# Patient Record
Sex: MALE | Race: WHITE | NOT HISPANIC OR LATINO | Employment: OTHER | URBAN - METROPOLITAN AREA
[De-identification: names, ages, dates, MRNs, and addresses within clinical notes are randomized per-mention and may not be internally consistent; named-entity substitution may affect disease eponyms.]

---

## 2017-04-17 ENCOUNTER — OFFICE VISIT (OUTPATIENT)
Dept: FAMILY MEDICINE | Facility: CLINIC | Age: 67
End: 2017-04-17
Payer: MEDICARE

## 2017-04-17 VITALS
TEMPERATURE: 98.5 F | HEART RATE: 72 BPM | OXYGEN SATURATION: 98 % | WEIGHT: 191.1 LBS | SYSTOLIC BLOOD PRESSURE: 134 MMHG | BODY MASS INDEX: 28.96 KG/M2 | HEIGHT: 68 IN | DIASTOLIC BLOOD PRESSURE: 71 MMHG

## 2017-04-17 DIAGNOSIS — H61.21 CERUMINOSIS, RIGHT: Primary | ICD-10-CM

## 2017-04-17 DIAGNOSIS — L21.9 SEBORRHEIC DERMATITIS: ICD-10-CM

## 2017-04-17 DIAGNOSIS — L30.9 DERMATITIS: ICD-10-CM

## 2017-04-17 PROCEDURE — 99212 OFFICE O/P EST SF 10 MIN: CPT | Performed by: PHYSICIAN ASSISTANT

## 2017-04-17 RX ORDER — HYDROCORTISONE VALERATE CREAM 2 MG/G
CREAM TOPICAL
Qty: 15 G | Refills: 3 | Status: SHIPPED | OUTPATIENT
Start: 2017-04-17 | End: 2017-04-19

## 2017-04-17 RX ORDER — FLUOCINOLONE ACETONIDE 0.1 MG/ML
SOLUTION TOPICAL
Qty: 60 ML | Refills: 1 | Status: SHIPPED | OUTPATIENT
Start: 2017-04-17 | End: 2019-11-13

## 2017-04-17 NOTE — MR AVS SNAPSHOT
"              After Visit Summary   4/17/2017    Tae Arguelles    MRN: [id number]           Patient Information     Date Of Birth          1950        Visit Information        Provider Department      4/17/2017 12:00 PM Janet Alcaraz PA-C [facility]        Today's Diagnoses     Ceruminosis, right    -  1    Seborrheic dermatitis        Dermatitis           Follow-ups after your visit        Who to contact     If you have questions or need follow up information about today's clinic visit or your schedule please contact [facility] directly at [phone].  Normal or non-critical lab and imaging results will be communicated to you by MyChart, letter or phone within 4 business days after the clinic has received the results. If you do not hear from us within 7 days, please contact the clinic through MyChart or phone. If you have a critical or abnormal lab result, we will notify you by phone as soon as possible.  Submit refill requests through [employer] or call your pharmacy and they will forward the refill request to us. Please allow 3 business days for your refill to be completed.          Additional Information About Your Visit        Care EveryWhere ID     This is your Care EveryWhere ID. This could be used by other organizations to access your [facility] medical records  [id number]        Your Vitals Were     Pulse Temperature Height Pulse Oximetry BMI (Body Mass Index)       72 98.5  F (36.9  C) (Oral) 5' 7.5\" (1.715 m) 98% 29.49 kg/m2        Blood Pressure from Last 3 Encounters:   04/17/17 134/71   12/17/15 122/60   08/18/15 127/74    Weight from Last 3 Encounters:   04/17/17 191 lb 1.6 oz (86.7 kg)   12/17/15 184 lb 4.8 oz (83.6 kg)   08/18/15 189 lb (85.7 kg)              Today, you had the following     No orders found for display         Today's Medication Changes          These changes are accurate as of: 4/17/17  1:00 PM.  If you have any questions, ask your nurse or " doctor.               These medicines have changed or have updated prescriptions.        Dose/Directions    fluocinolone 0.01 % solution   Commonly known as:  SYNALAR   This may have changed:  See the new instructions.   Used for:  Dermatitis   Changed by:  Janet Alcaraz PA-C        PRN   Quantity:  60 mL   Refills:  1       hydrocortisone 0.2 % cream   Commonly known as:  WESTCORT   This may have changed:  additional instructions   Used for:  Dermatitis   Changed by:  Janet Alcaraz PA-C        Apply sparingly to affected area bid for 14 days or less   Quantity:  15 g   Refills:  3            Where to get your medicines      These medications were sent to [facility] [address] [place] [address] [address] AT Counts include [facility] beds at the [facility]  [address], [facility] [address]    Hours:  24-hours Phone:  [phone]     fluocinolone 0.01 % solution    hydrocortisone 0.2 % cream                Primary Care Provider Office Phone # Fax #    Janet Alcaraz PA-C [phone]       [facility] [address]  [facility] [address]        Thank you!     Thank you for choosing [facility]  for your care. Our goal is always to provide you with excellent care. Hearing back from our patients is one way we can continue to improve our services. Please take a few minutes to complete the written survey that you may receive in the mail after your visit with us. Thank you!             Your Updated Medication List - Protect others around you: Learn how to safely use, store and throw away your medicines at www.disposemymeds.org.          This list is accurate as of: 4/17/17  1:00 PM.  Always use your most recent med list.                   Brand Name Dispense Instructions for use    ASPIRIN NOT PRESCRIBED    INTENTIONAL    0 each    81 each continuous prn. Antiplatelet medication not prescribed intentionally due to Current anticoagulant therapy (warfarin/enoxaparin)        fluocinolone 0.01 % solution    SYNALAR    60 mL    PRN       furosemide 40 MG tablet    LASIX     1 TABLET DAILY       hydrocortisone 0.2 % cream    WESTCORT    15 g    Apply sparingly to affected area bid for 14 days or less       LIPITOR 20 MG tablet   Generic drug:  atorvastatin      1 TABLET DAILY       lisinopril 10 MG tablet    PRINIVIL/ZESTRIL     1 TABLET DAILY       metoprolol 50 MG tablet    LOPRESSOR    180 tablet    Take 1 tablet by mouth 2 times daily.       Multi-vitamin Tabs tablet      Take 1 tablet by mouth daily.       Niacin 750 MG Tbcr      2 tabs qhs       nitroglycerin 0.4 MG sublingual tablet    NITROSTAT     Place  under the tongue every 5 minutes as needed.       vitamin D 1000 UNITS capsule      Take 1 capsule by mouth daily.       * warfarin 5 MG tablet    COUMADIN     Take 2.5 mg by mouth Tuesday only       * warfarin 7.5 MG tablet    COUMADIN     Take by mouth daily Sunday, Monday, Wed,Thurs, Friday, Sat       * Notice:  This list has 2 medication(s) that are the same as other medications prescribed for you. Read the directions carefully, and ask your doctor or other care provider to review them with you.

## 2017-04-17 NOTE — NURSING NOTE
"Chief Complaint   Patient presents with     Recheck Medication     Ear Problem       Initial /71 (BP Location: Left arm, Patient Position: Chair, Cuff Size: Adult Large)  Pulse 72  Temp 98.5  F (36.9  C) (Oral)  Ht 5' 7.5\" (1.715 m)  Wt 191 lb 1.6 oz (86.7 kg)  SpO2 98%  BMI 29.49 kg/m2 Estimated body mass index is 29.49 kg/(m^2) as calculated from the following:    Height as of this encounter: 5' 7.5\" (1.715 m).    Weight as of this encounter: 191 lb 1.6 oz (86.7 kg).  Medication Reconciliation: complete   Stefany He MA  "

## 2017-04-17 NOTE — PROGRESS NOTES
HPI: 67 yo male here with complaint of R ear feeling plugged for the past week  Has hx of ear wax build up  No cold sx or ear pain    L knee pain x 2 years off and on  Feels full and some burning under the kneecap marj when exercising on the treadmill  This has clucked a few times with walking; this very rare  Denies knee swelling or redness  Tylenol helps when he takes this periodically.  Wants to know if okay to cont exercise.      Past Medical History:   Diagnosis Date     BPH (benign prostatic hyperplasia)     Dr. Whitfield, PSA 0.27 12/2012     CAD (coronary artery disease) 1997    MI      CHF (congestive heart failure) (H)      Colon polyp      History of smoking     Quit 1997     HTN (hypertension), benign      Hyperlipidemia LDL goal <100      Ischemic cardiomyopathy     Dr. Alva     Lichen planus      Normal colonoscopy 1-3-08    Repeat 5-10 years     Pacemaker 1997, 2005    ventricular tachycardia     Pleural effusion      Pneumonia 1998     Pneumothorax     plus rib fx secondary to fall (L)     Seborrheic dermatitis      Toe fracture, right      Past Surgical History:   Procedure Laterality Date     C CABG, ARTERIAL, TWO  1997     COLONOSCOPY N/A 2/12/2015    Procedure: COLONOSCOPY;  Surgeon: Zac Austin MD;  Location:  GI     MOUTH SURGERY  2001    Upper and lower dentures     Social History   Substance Use Topics     Smoking status: Former Smoker     Quit date: 10/23/1997     Smokeless tobacco: Never Used     Alcohol use 0.0 oz/week     0 Standard drinks or equivalent per week      Comment: 1 beer daily     Current Outpatient Prescriptions   Medication Sig Dispense Refill     fluocinolone (SYNALAR) 0.01 % solution PRN 60 mL 1     hydrocortisone (WESTCORT) 0.2 % cream Apply sparingly to affected area bid for 14 days or less 15 g 3     metoprolol (LOPRESSOR) 50 MG tablet Take 1 tablet by mouth 2 times daily. 180 tablet 3     warfarin (COUMADIN) 5 MG tablet Take 2.5 mg by mouth Tuesday only    "    warfarin (COUMADIN) 7.5 MG tablet Take by mouth daily Sunday, Monday, Wed,Thurs, Friday, Sat       ASPIRIN NOT PRESCRIBED, INTENTIONAL, 81 each continuous prn. Antiplatelet medication not prescribed intentionally due to Current anticoagulant therapy (warfarin/enoxaparin) 0 each 0     nitroglycerin (NITROSTAT) 0.4 MG SL tablet Place  under the tongue every 5 minutes as needed.       Cholecalciferol (VITAMIN D) 1000 UNITS capsule Take 1 capsule by mouth daily.       multivitamin, therapeutic with minerals (MULTI-VITAMIN) TABS Take 1 tablet by mouth daily.       LISINOPRIL 10 MG OR TABS 1 TABLET DAILY       FUROSEMIDE 40 MG OR TABS 1 TABLET DAILY       LIPITOR 20 MG OR TABS 1 TABLET DAILY       NIACIN 750 MG OR TBCR 2 tabs qhs       Allergies   Allergen Reactions     Septra [Sulfa Drugs]      FAMILY HISTORY NOTED AND REVIEWED    PHYSICAL EXAM:    /71 (BP Location: Left arm, Patient Position: Chair, Cuff Size: Adult Large)  Pulse 72  Temp 98.5  F (36.9  C) (Oral)  Ht 5' 7.5\" (1.715 m)  Wt 191 lb 1.6 oz (86.7 kg)  SpO2 98%  BMI 29.49 kg/m2    Patient appears non toxic    R ear: cerumen impaction noted.    L ear: minimal cerumen in canal, TM pearly grey    Assessment and Plan:     (H61.21) Ceruminosis, right  (primary encounter diagnosis)  Comment:   Plan: irrig done.    (L21.9) Seborrheic dermatitis  Comment:   Plan: refilled westcort; he is using this sparingly    (L30.9) Dermatitis  Comment: ears itchy at times  Plan: fluocinolone (SYNALAR) 0.01 % solution,             Has cardiac meds refilled by Dr. Alva at [facility].  Reviewed lipids on CareEverywhere    Janet Alcaraz PA-C          "

## 2017-04-19 ENCOUNTER — TELEPHONE (OUTPATIENT)
Dept: FAMILY MEDICINE | Facility: CLINIC | Age: 67
End: 2017-04-19

## 2017-04-19 DIAGNOSIS — L21.9 SEBORRHEIC DERMATITIS: Primary | ICD-10-CM

## 2017-04-19 RX ORDER — TRIAMCINOLONE ACETONIDE 1 MG/G
CREAM TOPICAL
Qty: 15 G | Refills: 0 | Status: SHIPPED | OUTPATIENT
Start: 2017-04-19 | End: 2017-11-07

## 2017-04-19 NOTE — TELEPHONE ENCOUNTER
Fax from [facility] ([facility])    Hydrocortisone 0.2% cream is not covered by insurance.    Rx'd 4/17/17    RT Doug (R)

## 2017-11-07 ENCOUNTER — OFFICE VISIT (OUTPATIENT)
Dept: FAMILY MEDICINE | Facility: CLINIC | Age: 67
End: 2017-11-07
Payer: MEDICARE

## 2017-11-07 VITALS
OXYGEN SATURATION: 99 % | WEIGHT: 181 LBS | TEMPERATURE: 97.3 F | HEIGHT: 68 IN | SYSTOLIC BLOOD PRESSURE: 125 MMHG | BODY MASS INDEX: 27.43 KG/M2 | DIASTOLIC BLOOD PRESSURE: 70 MMHG | HEART RATE: 70 BPM

## 2017-11-07 DIAGNOSIS — R73.9 HYPERGLYCEMIA: Primary | ICD-10-CM

## 2017-11-07 DIAGNOSIS — Z12.5 SCREENING FOR PROSTATE CANCER: ICD-10-CM

## 2017-11-07 DIAGNOSIS — D75.89 MACROCYTOSIS WITHOUT ANEMIA: ICD-10-CM

## 2017-11-07 DIAGNOSIS — Z95.0 PACEMAKER: ICD-10-CM

## 2017-11-07 DIAGNOSIS — I10 HTN (HYPERTENSION), BENIGN: ICD-10-CM

## 2017-11-07 DIAGNOSIS — N40.0 BENIGN PROSTATIC HYPERPLASIA WITHOUT LOWER URINARY TRACT SYMPTOMS: ICD-10-CM

## 2017-11-07 DIAGNOSIS — I25.5 ISCHEMIC CARDIOMYOPATHY: ICD-10-CM

## 2017-11-07 DIAGNOSIS — Z00.00 ENCOUNTER FOR ROUTINE ADULT HEALTH EXAMINATION WITHOUT ABNORMAL FINDINGS: ICD-10-CM

## 2017-11-07 LAB
ALBUMIN SERPL-MCNC: 4.4 G/DL (ref 3.4–5)
ALP SERPL-CCNC: 80 U/L (ref 40–150)
ALT SERPL W P-5'-P-CCNC: 42 U/L (ref 0–70)
ANION GAP SERPL CALCULATED.3IONS-SCNC: 8 MMOL/L (ref 3–14)
AST SERPL W P-5'-P-CCNC: 32 U/L (ref 0–45)
BILIRUB SERPL-MCNC: 0.8 MG/DL (ref 0.2–1.3)
BUN SERPL-MCNC: 10 MG/DL (ref 7–30)
CALCIUM SERPL-MCNC: 9.6 MG/DL (ref 8.5–10.1)
CHLORIDE SERPL-SCNC: 103 MMOL/L (ref 94–109)
CO2 SERPL-SCNC: 26 MMOL/L (ref 20–32)
CREAT SERPL-MCNC: 0.68 MG/DL (ref 0.66–1.25)
ERYTHROCYTE [DISTWIDTH] IN BLOOD BY AUTOMATED COUNT: 12.1 % (ref 10–15)
GFR SERPL CREATININE-BSD FRML MDRD: >90 ML/MIN/1.7M2
GLUCOSE SERPL-MCNC: 117 MG/DL (ref 70–99)
HCT VFR BLD AUTO: 39.5 % (ref 40–53)
HGB BLD-MCNC: 14 G/DL (ref 13.3–17.7)
MCH RBC QN AUTO: 37.5 PG (ref 26.5–33)
MCHC RBC AUTO-ENTMCNC: 35.4 G/DL (ref 31.5–36.5)
MCV RBC AUTO: 106 FL (ref 78–100)
PLATELET # BLD AUTO: 129 10E9/L (ref 150–450)
POTASSIUM SERPL-SCNC: 4.4 MMOL/L (ref 3.4–5.3)
PROT SERPL-MCNC: 7.9 G/DL (ref 6.8–8.8)
RBC # BLD AUTO: 3.73 10E12/L (ref 4.4–5.9)
SODIUM SERPL-SCNC: 137 MMOL/L (ref 133–144)
WBC # BLD AUTO: 5.7 10E9/L (ref 4–11)

## 2017-11-07 PROCEDURE — 83036 HEMOGLOBIN GLYCOSYLATED A1C: CPT | Performed by: PHYSICIAN ASSISTANT

## 2017-11-07 PROCEDURE — 85027 COMPLETE CBC AUTOMATED: CPT | Performed by: PHYSICIAN ASSISTANT

## 2017-11-07 PROCEDURE — 36415 COLL VENOUS BLD VENIPUNCTURE: CPT | Performed by: PHYSICIAN ASSISTANT

## 2017-11-07 PROCEDURE — G0439 PPPS, SUBSEQ VISIT: HCPCS | Performed by: PHYSICIAN ASSISTANT

## 2017-11-07 PROCEDURE — G0103 PSA SCREENING: HCPCS | Performed by: PHYSICIAN ASSISTANT

## 2017-11-07 PROCEDURE — 82607 VITAMIN B-12: CPT | Performed by: PHYSICIAN ASSISTANT

## 2017-11-07 PROCEDURE — 80053 COMPREHEN METABOLIC PANEL: CPT | Performed by: PHYSICIAN ASSISTANT

## 2017-11-07 NOTE — MR AVS SNAPSHOT
After Visit Summary   11/7/2017    Tae Arguelles    MRN: [id number]           Patient Information     Date Of Birth          1950        Visit Information        Provider Department      11/7/2017 10:30 AM Janet Alcaraz PA-C [facility]        Today's Diagnoses     Encounter for routine adult health examination without abnormal findings    -  1    Hyperlipidemia LDL goal <100        HTN (hypertension), benign        Ischemic cardiomyopathy        Benign prostatic hyperplasia without lower urinary tract symptoms        Pacemaker        Screening for prostate cancer          Care Instructions      Preventive Health Recommendations:       Male Ages 65 and over    Yearly exam:             See your health care provider every year in order to  o   Review health changes.   o   Discuss preventive care.    o   Review your medicines if your doctor has prescribed any.    Talk with your health care provider about whether you should have a test to screen for prostate cancer (PSA).    Every 3 years, have a diabetes test (fasting glucose). If you are at risk for diabetes, you should have this test more often.    Every 5 years, have a cholesterol test. Have this test more often if you are at risk for high cholesterol or heart disease.     Every 10 years, have a colonoscopy. Or, have a yearly FIT test (stool test). These exams will check for colon cancer.    Talk to with your health care provider about screening for Abdominal Aortic Aneurysm if you have a family history of AAA or have a history of smoking.  Shots:     Get a flu shot each year.     Get a tetanus shot every 10 years.     Talk to your doctor about your pneumonia vaccines. There are now two you should receive - Pneumovax (PPSV 23) and Prevnar (PCV 13).    Talk to your doctor about a shingles vaccine.     Talk to your doctor about the hepatitis B vaccine.  Nutrition:     Eat at least 5 servings of fruits and vegetables each day.  "    Eat whole-grain bread, whole-wheat pasta and brown rice instead of white grains and rice.     Talk to your doctor about Calcium and Vitamin D.   Lifestyle    Exercise for at least 150 minutes a week (30 minutes a day, 5 days a week). This will help you control your weight and prevent disease.     Limit alcohol to one drink per day.     No smoking.     Wear sunscreen to prevent skin cancer.     See your dentist every six months for an exam and cleaning.     See your eye doctor every 1 to 2 years to screen for conditions such as glaucoma, macular degeneration and cataracts.          Follow-ups after your visit        Who to contact     If you have questions or need follow up information about today's clinic visit or your schedule please contact [facility] directly at [phone].  Normal or non-critical lab and imaging results will be communicated to you by MyChart, letter or phone within 4 business days after the clinic has received the results. If you do not hear from us within 7 days, please contact the clinic through MyChart or phone. If you have a critical or abnormal lab result, we will notify you by phone as soon as possible.  Submit refill requests through [employer] or call your pharmacy and they will forward the refill request to us. Please allow 3 business days for your refill to be completed.          Additional Information About Your Visit        Care EveryWhere ID     This is your Care EveryWhere ID. This could be used by other organizations to access your [facility] medical records  [id number]        Your Vitals Were     Pulse Temperature Height Pulse Oximetry BMI (Body Mass Index)       70 97.3  F (36.3  C) (Oral) 5' 7.5\" (1.715 m) 99% 27.93 kg/m2        Blood Pressure from Last 3 Encounters:   11/07/17 125/70   04/17/17 134/71   12/17/15 122/60    Weight from Last 3 Encounters:   11/07/17 181 lb (82.1 kg)   04/17/17 191 lb 1.6 oz (86.7 kg)   12/17/15 184 lb 4.8 oz (83.6 kg)            "   We Performed the Following     CBC with platelets     Comprehensive metabolic panel     Prostate spec antigen screen        Primary Care Provider Office Phone # Fax #    Janet Alcaraz PA-C [phone] [address] [facility] [address]  [facility] [address]        Equal Access to Services     [facility] SACHA : Hadii aad ku hadasho Soomaali, waaxda luqadaha, qaybta kaalmada adeegyada, waxay idiin hayaan adeeg khjeremiah la'aan . So [facility] [phone].    ATENCIÓN: Si habla español, tiene a otto disposición servicios gratuitos de asistencia lingüística. Llame al [phone].    We comply with applicable federal civil rights laws and [place] laws. We do not discriminate on the basis of race, color, national origin, age, disability, sex, sexual orientation, or gender identity.            Thank you!     Thank you for choosing [facility]  for your care. Our goal is always to provide you with excellent care. Hearing back from our patients is one way we can continue to improve our services. Please take a few minutes to complete the written survey that you may receive in the mail after your visit with us. Thank you!             Your Updated Medication List - Protect others around you: Learn how to safely use, store and throw away your medicines at www.disposemymeds.org.          This list is accurate as of: 11/7/17 11:07 AM.  Always use your most recent med list.                   Brand Name Dispense Instructions for use Diagnosis    ASPIRIN NOT PRESCRIBED    INTENTIONAL    0 each    81 each continuous prn. Antiplatelet medication not prescribed intentionally due to Current anticoagulant therapy (warfarin/enoxaparin)    CAD (coronary artery disease)       fluocinolone 0.01 % solution    SYNALAR    60 mL    PRN    Dermatitis       furosemide 40 MG tablet    LASIX     1 TABLET DAILY        LIPITOR 20 MG tablet   Generic drug:  atorvastatin      1 TABLET DAILY        lisinopril 10 MG tablet    PRINIVIL/ZESTRIL      1 TABLET DAILY        metoprolol 50 MG tablet    LOPRESSOR    180 tablet    Take 1 tablet by mouth 2 times daily.        Multi-vitamin Tabs tablet      Take 1 tablet by mouth daily.        Niacin 750 MG Tbcr      2 tabs qhs        nitroGLYcerin 0.4 MG sublingual tablet    NITROSTAT     Place  under the tongue every 5 minutes as needed.        vitamin D 1000 UNITS capsule      Take 1 capsule by mouth daily.        * warfarin 5 MG tablet    COUMADIN     Take 2.5 mg by mouth Tuesday only        * warfarin 7.5 MG tablet    COUMADIN     Take by mouth daily Sunday, Monday, Wed,Thurs, Friday, Sat        * Notice:  This list has 2 medication(s) that are the same as other medications prescribed for you. Read the directions carefully, and ask your doctor or other care provider to review them with you.

## 2017-11-07 NOTE — LETTER
[facility]  [address] [facility]  [address]  [place]  [address]  Tel: [phone]    November 9, 2017    Tae Arguelles  [address] [facility] [address]        Dear Mr. Arguelles,    It was a pleasure seeing you for your physical examination.  I wanted to get back to you with your test results.  I have enclosed a copy for your review.      Your chemistry panel shows no signs of diabetes but the fasting glucose was high as usual at 117.  I did add on a hemoglobin A1c which looks fine at 5.2%.  Your blood salts, kidney tests, liver tests, and proteins are all fine.    Your white blood cell count and red blood cell counts are normal but the red cells are large in size and the platelets are low. This is often seen in people that drink moderate amounts of alcohol.  Please cut down on your intake of alcohol and return in 3 months for a recheck.    Your PSA was normal at 0.50 so let Dr. Whitfield know when you see him.    Let me know if you have any questions.    Sincerely,    Janet Alcaraz PA-C/DUC      Enclosure: Lab Results  Results for orders placed or performed in visit on 11/07/17   Comprehensive metabolic panel   Result Value Ref Range    Sodium 137 133 - 144 mmol/L    Potassium 4.4 3.4 - 5.3 mmol/L    Chloride 103 94 - 109 mmol/L    Carbon Dioxide 26 20 - 32 mmol/L    Anion Gap 8 3 - 14 mmol/L    Glucose 117 (H) 70 - 99 mg/dL    Urea Nitrogen 10 7 - 30 mg/dL    Creatinine 0.68 0.66 - 1.25 mg/dL    GFR Estimate >90 >60 mL/min/1.7m2    GFR Estimate If Black >90 >60 mL/min/1.7m2    Calcium 9.6 8.5 - 10.1 mg/dL    Bilirubin Total 0.8 0.2 - 1.3 mg/dL    Albumin 4.4 3.4 - 5.0 g/dL    Protein Total 7.9 6.8 - 8.8 g/dL    Alkaline Phosphatase 80 40 - 150 U/L    ALT 42 0 - 70 U/L    AST 32 0 - 45 U/L   CBC with platelets   Result Value Ref Range    WBC 5.7 4.0 - 11.0 10e9/L    RBC Count 3.73 (L) 4.4 - 5.9 10e12/L    Hemoglobin 14.0 13.3 - 17.7 g/dL    Hematocrit 39.5 (L) 40.0 - 53.0 %     (H)  78 - 100 fl    MCH 37.5 (H) 26.5 - 33.0 pg    MCHC 35.4 31.5 - 36.5 g/dL    RDW 12.1 10.0 - 15.0 %    Platelet Count 129 (L) 150 - 450 10e9/L   Prostate spec antigen screen   Result Value Ref Range    PSA 0.50 0 - 4 ug/L   Hemoglobin A1c   Result Value Ref Range    Hemoglobin A1C 5.2 4.3 - 6.0 %   Vitamin B12   Result Value Ref Range    Vitamin B12 497 193 - 986 pg/mL

## 2017-11-07 NOTE — NURSING NOTE
"Chief Complaint   Patient presents with     Wellness Visit     Fasting       Initial /70 (BP Location: Right arm, Cuff Size: Adult Regular)  Pulse 70  Temp 97.3  F (36.3  C) (Oral)  Ht 5' 7.5\" (1.715 m)  Wt 181 lb (82.1 kg)  SpO2 99%  BMI 27.93 kg/m2 Estimated body mass index is 27.93 kg/(m^2) as calculated from the following:    Height as of this encounter: 5' 7.5\" (1.715 m).    Weight as of this encounter: 181 lb (82.1 kg).  Medication Reconciliation: complete       Asya Danielle CMA      "

## 2017-11-07 NOTE — PROGRESS NOTES
SUBJECTIVE:   Tae Arguelles is a 67 year old male who presents for Preventive Visit.  Pt has f/u appt with Dr. Augustineion Dec 4th and will have INR and chol checked there  He has appt with urol on dec 1st  Fasting for labs today  He has seborrhea and uses hydrocortisone cream prescribed by derm which is more expensive but works well.  He has a defibrillator   Last INR was 3.4 on 10/31/17; managed by anticoag clinic at [facility]  He has lost 10lbs over the summer pouring a new concrete deck over the summer and eating better    Are you in the first 12 months of your Medicare Part B coverage?  No    Healthy Habits:    Do you get at least three servings of calcium containing foods daily (dairy, green leafy vegetables, etc.)? yes    Amount of exercise or daily activities, outside of work: 5 day(s) per week    Problems taking medications regularly No    Medication side effects: No    Have you had an eye exam in the past two years? yes    Do you see a dentist twice per year? no    Do you have sleep apnea, excessive snoring or daytime drowsiness?no    COGNITIVE SCREEN  1) Repeat 3 items (Banana, Sunrise, Chair)    2) Clock draw: NORMAL  3) 3 item recall: Recalls 2 objects   Results: NORMAL clock, 1-2 items recalled: COGNITIVE IMPAIRMENT LESS LIKELY    Mini-CogTM Copyright S Lala. Licensed by the author for use in [facility]; reprinted with permission (tere@.[facility]). All rights reserved.        Reviewed and updated as needed this visit by clinical staff  Tobacco  Allergies  Meds  Med Hx  Fam Hx         Reviewed and updated as needed this visit by Provider  Allergies  Meds  Med Hx  Fam Hx        Social History   Substance Use Topics     Smoking status: Former Smoker     Quit date: 10/23/1997     Smokeless tobacco: Never Used     Alcohol use 0.0 oz/week     0 Standard drinks or equivalent per week      Comment: 13 drinks a week       The patient does not drink >3 drinks per day nor >7 drinks per  week.    Today's PHQ-2 Score:   PHQ-2 ( 1999 Pf[employer]) 11/7/2017 4/17/2017   Q1: Little interest or pleasure in doing things 0 0   Q2: Feeling down, depressed or hopeless 0 0   PHQ-2 Score 0 0         Do you feel safe in your environment - Yes    Do you have a Health Care Directive?: No: Advance care planning reviewed with patient; information given to patient to review.      Current providers sharing in care for this patient include: Patient Care Team:  Janet Alcaraz PA-C as PCP - General (Internal Medicine)      Hearing impairment: No    Ability to successfully perform activities of daily living: Yes, no assistance needed     Fall risk:  Fallen 2 or more times in the past year?: No  Any fall with injury in the past year?: No      Home safety:  lack of grab bars in the bathroom      The following health maintenance items are reviewed in Epic and correct as of today:  Health Maintenance   Topic Date Due     MEDICARE ANNUAL WELLNESS VISIT  10/16/1966     AORTIC ANEURYSM SCREENING (SYSTEM ASSIGNED)  10/16/2013     HF ACTION PLAN Q1 YR  03/26/2014     FALL RISK ASSESSMENT  04/07/2016     LIPID MONITORING Q1 YEAR  05/27/2016     ADVANCE DIRECTIVE PLANNING Q5 YRS  03/26/2018     BMP Q6 MOS  05/07/2018     ALT Q1 YR  11/07/2018     CBC Q1 YR  11/07/2018     TETANUS IMMUNIZATION (SYSTEM ASSIGNED)  03/26/2023     COLON CANCER SCREEN (SYSTEM ASSIGNED)  02/12/2025     INFLUENZA VACCINE (SYSTEM ASSIGNED)  Completed     PNEUMOCOCCAL  Completed     HEPATITIS C SCREENING  Completed     Past Medical History:   Diagnosis Date     BPH (benign prostatic hyperplasia)     Dr. Whitfield, PSA 0.27 12/2012     CAD (coronary artery disease) 1997    MI      CHF (congestive heart failure) (H)      Colon polyp      History of smoking     Quit 1997     HTN (hypertension), benign      Hyperlipidemia LDL goal <100      Ischemic cardiomyopathy     Dr. Alva     Lichen planus      Normal colonoscopy 1-3-08    Repeat 5-10 years     Pacemaker  "1997, 2005    ventricular tachycardia     Pleural effusion      Pneumonia 1998     Pneumothorax     plus rib fx secondary to fall (L)     Seborrheic dermatitis      Toe fracture, right      Past Surgical History:   Procedure Laterality Date     C CABG, ARTERIAL, TWO  1997     COLONOSCOPY N/A 2/12/2015    Procedure: COLONOSCOPY;  Surgeon: Zac Austin MD;  Location:  GI     MOUTH SURGERY  2001    Upper and lower dentures     Current Outpatient Prescriptions   Medication Sig Dispense Refill     fluocinolone (SYNALAR) 0.01 % solution PRN 60 mL 1     metoprolol (LOPRESSOR) 50 MG tablet Take 1 tablet by mouth 2 times daily. 180 tablet 3     warfarin (COUMADIN) 5 MG tablet Take 2.5 mg by mouth Tuesday only       warfarin (COUMADIN) 7.5 MG tablet Take by mouth daily Sunday, Monday, Wed,Thurs, Friday, Sat       nitroglycerin (NITROSTAT) 0.4 MG SL tablet Place  under the tongue every 5 minutes as needed.       Cholecalciferol (VITAMIN D) 1000 UNITS capsule Take 1 capsule by mouth daily.       multivitamin, therapeutic with minerals (MULTI-VITAMIN) TABS Take 1 tablet by mouth daily.       LISINOPRIL 10 MG OR TABS 1 TABLET DAILY       FUROSEMIDE 40 MG OR TABS 1 TABLET DAILY       LIPITOR 20 MG OR TABS 1 TABLET DAILY       NIACIN 750 MG OR TBCR 2 tabs qhs       ASPIRIN NOT PRESCRIBED, INTENTIONAL, 81 each continuous prn. Antiplatelet medication not prescribed intentionally due to Current anticoagulant therapy (warfarin/enoxaparin) (Patient not taking: Reported on 11/7/2017) 0 each 0       ROS:  Constitutional, HEENT, cardiovascular, pulmonary, GI, , musculoskeletal, neuro, skin, endocrine and psych systems are negative, except as otherwise noted.      OBJECTIVE:   /70 (BP Location: Right arm, Cuff Size: Adult Regular)  Pulse 70  Temp 97.3  F (36.3  C) (Oral)  Ht 5' 7.5\" (1.715 m)  Wt 181 lb (82.1 kg)  SpO2 99%  BMI 27.93 kg/m2 Estimated body mass index is 27.93 kg/(m^2) as calculated from the " "following:    Height as of this encounter: 5' 7.5\" (1.715 m).    Weight as of this encounter: 181 lb (82.1 kg).  EXAM:   GENERAL: healthy, alert and no distress  EYES: Eyes grossly normal to inspection, PERRL and conjunctivae and sclerae normal  HENT: ear canals and TM's normal, nose and mouth without ulcers or lesions  NECK: no adenopathy, no asymmetry, masses, or scars and thyroid normal to palpation  RESP: lungs clear to auscultation - no rales, rhonchi or wheezes  CV: regular rate and rhythm, normal S1 S2, no S3 or S4, no murmur, click or rub, no peripheral edema and peripheral pulses strong  ABDOMEN: soft, nontender, no hepatosplenomegaly, no masses and bowel sounds normal  MS: no gross musculoskeletal defects noted, no edema  SKIN: no suspicious lesions or rashes  NEURO: Normal strength and tone, mentation intact and speech normal  PSYCH: mentation appears normal, affect normal/bright    ASSESSMENT / PLAN:   Assessment and Plan:     (Z00.00) Encounter for routine adult health examination without abnormal findings  (primary encounter diagnosis)  Comment: colonoscopy is up to date. Immun up to date.  Plan: CBC with platelets            (I10) HTN (hypertension), benign  Comment: well controlled, cont same. Meds prescribed by Dr. Alva.  Plan: Comprehensive metabolic panel            (I25.5) Ischemic cardiomyopathy  Comment:   Plan: Pt had a stable nuclear stress test 12/7/16; see CareEverywhere    (N40.0) Benign prostatic hyperplasia without lower urinary tract symptoms  Comment:   Plan: PSA today. He plans to f/u with Dr. Whitfield next month    (Z95.0) Pacemaker  Comment:   Plan: per pacemaker clinic    (Z12.5) Screening for prostate cancer  Comment:   Plan: Prostate spec antigen screen          End of Life Planning:  Patient currently has an advanced directive: No.  I have verified the patient's ablity to prepare an advanced directive/make health care decisions.  Literature was provided to assist patient in " "preparing an advanced directive.    COUNSELING:  Reviewed preventive health counseling, as reflected in patient instructions        Estimated body mass index is 27.93 kg/(m^2) as calculated from the following:    Height as of this encounter: 5' 7.5\" (1.715 m).    Weight as of this encounter: 181 lb (82.1 kg).     reports that he quit smoking about 20 years ago. He has never used smokeless tobacco.        Appropriate preventive services were discussed with this patient, including applicable screening as appropriate for cardiovascular disease, diabetes, osteopenia/osteoporosis, and glaucoma.  As appropriate for age/gender, discussed screening for colorectal cancer, prostate cancer, breast cancer, and cervical cancer. Checklist reviewing preventive services available has been given to the patient.    Reviewed patients plan of care and provided an AVS. The Basic Care Plan (routine screening as documented in Health Maintenance) for Tae meets the Care Plan requirement. This Care Plan has been established and reviewed with the Patient.    Counseling Resources:  ATP IV Guidelines  Pooled Cohorts Equation Calculator  Breast Cancer Risk Calculator  FRAX Risk Assessment  ICSI Preventive Guidelines  Dietary Guidelines for Americans, 2010  USDA's MyPlate  ASA Prophylaxis  Lung CA Screening    Janet Alcaraz PA-C  [facility]  "

## 2017-11-08 LAB
HBA1C MFR BLD: 5.2 % (ref 4.3–6)
PSA SERPL-ACNC: 0.5 UG/L (ref 0–4)
VIT B12 SERPL-MCNC: 497 PG/ML (ref 193–986)

## 2017-11-09 NOTE — PROGRESS NOTES
It was a pleasure seeing you for your physical examination.  I wanted to get back to you with your test results.  I have enclosed a copy for your review.      Your chemistry panel shows no signs of diabetes but the fasting glucose was high as usual at 117.  I did add on a hemoglobin A1c which looks fine at 5.2%.  Your blood salts, kidney tests, liver tests, and proteins are all fine.    Your white blood cell count and red blood cell counts are normal but the red cells are large in size and the platelets are low. This is often seen in people that drink moderate amounts of alcohol.  Please cut down on your intake of alcohol and return in 3 months for a recheck.    Your PSA was normal at 0.50 so let Dr. Whitfield know when you see him.    Let me know if you have any questions.    Janet Alcaraz PA-C

## 2017-12-01 ENCOUNTER — TRANSFERRED RECORDS (OUTPATIENT)
Dept: HEALTH INFORMATION MANAGEMENT | Facility: CLINIC | Age: 67
End: 2017-12-01

## 2017-12-04 ENCOUNTER — TRANSFERRED RECORDS (OUTPATIENT)
Dept: HEALTH INFORMATION MANAGEMENT | Facility: CLINIC | Age: 67
End: 2017-12-04

## 2018-02-13 ENCOUNTER — DOCUMENTATION ONLY (OUTPATIENT)
Dept: LAB | Facility: CLINIC | Age: 68
End: 2018-02-13

## 2018-02-13 DIAGNOSIS — D69.6 THROMBOCYTOPENIA (H): Primary | ICD-10-CM

## 2018-02-15 DIAGNOSIS — D69.6 THROMBOCYTOPENIA (H): ICD-10-CM

## 2018-02-15 LAB
ERYTHROCYTE [DISTWIDTH] IN BLOOD BY AUTOMATED COUNT: 12 % (ref 10–15)
HCT VFR BLD AUTO: 41.6 % (ref 40–53)
HGB BLD-MCNC: 14.2 G/DL (ref 13.3–17.7)
MCH RBC QN AUTO: 35.9 PG (ref 26.5–33)
MCHC RBC AUTO-ENTMCNC: 34.1 G/DL (ref 31.5–36.5)
MCV RBC AUTO: 105 FL (ref 78–100)
PLATELET # BLD AUTO: 140 10E9/L (ref 150–450)
RBC # BLD AUTO: 3.95 10E12/L (ref 4.4–5.9)
WBC # BLD AUTO: 7.4 10E9/L (ref 4–11)

## 2018-02-15 PROCEDURE — 36415 COLL VENOUS BLD VENIPUNCTURE: CPT | Performed by: PHYSICIAN ASSISTANT

## 2018-02-15 PROCEDURE — 85027 COMPLETE CBC AUTOMATED: CPT | Performed by: PHYSICIAN ASSISTANT

## 2018-02-19 ENCOUNTER — TELEPHONE (OUTPATIENT)
Dept: FAMILY MEDICINE | Facility: CLINIC | Age: 68
End: 2018-02-19

## 2018-02-19 NOTE — PROGRESS NOTES
Call pt  Labs about the same although platelets improved so that is good.  Ask him if he was able to cut back on the amount of alcohol is drinking?

## 2018-02-19 NOTE — TELEPHONE ENCOUNTER
Unable to reach Osman, he was on his treadmill. Asked him to return call for results message from any MA. Wife asked for results, but there is no consent to communicate on file so unable to give her any information. Please relay results message from Janet Alcaraz to Tae when he calls back.   Message from Janet Alcaraz:       CBC with platelets   Status:  Final result   Visible to patient:  No (Inaccessible in MyChart) Dx:  Thrombocytopenia (H) Order: [id number]       Notes Recorded by Janet Alcaraz PA-C on 2/19/2018 at 2:05 PM  Call pt  Labs about the same although platelets improved so that is good.  Ask him if he was able to cut back on the amount of alcohol is drinking?           Ref Range & Units 4d ago   3mo ago        WBC 4.0 - 11.0 10e9/L 7.4 5.7      RBC Count 4.4 - 5.9 10e12/L 3.95 (L) 3.73 (L)      Hemoglobin 13.3 - 17.7 g/dL 14.2 14.0      Hematocrit 40.0 - 53.0 % 41.6 39.5 (L)      MCV 78 - 100 fl 105 (H) 106 (H)      MCH 26.5 - 33.0 pg 35.9 (H) 37.5 (H)      MCHC 31.5 - 36.5 g/dL 34.1 35.4      RDW 10.0 - 15.0 % 12.0 12.1      Platelet Count 150 - 450 10e9/L 140 (L) 129 (L)     [facility] [place]          Specimen Collected: 02/15/18  9:02 AM Last Resulted: 02/15/18  1:54 PM

## 2018-02-19 NOTE — TELEPHONE ENCOUNTER
Osman calling back, I relayed the lab result note to him. He says that he has decreased his alcoholic beverages from about 11-12 beers/week to no more than 6 beers/week. He reports being very consistent with this change since Nov 2017.    Patient was also asking what you were specifically looking for in these lab results.    Allen Davila, CMA

## 2018-02-20 NOTE — TELEPHONE ENCOUNTER
His platelets and red blood cells were abnormal and that is something I see in pts with regular use of alcohol.  Numbers are still a bit out of range but have improved.

## 2018-10-30 LAB — EJECTION FRACTION: 32 %

## 2018-11-12 ENCOUNTER — OFFICE VISIT (OUTPATIENT)
Dept: FAMILY MEDICINE | Facility: CLINIC | Age: 68
End: 2018-11-12
Payer: MEDICARE

## 2018-11-12 VITALS
SYSTOLIC BLOOD PRESSURE: 116 MMHG | HEIGHT: 68 IN | TEMPERATURE: 98.5 F | DIASTOLIC BLOOD PRESSURE: 65 MMHG | OXYGEN SATURATION: 96 % | HEART RATE: 80 BPM | WEIGHT: 181 LBS | BODY MASS INDEX: 27.43 KG/M2

## 2018-11-12 DIAGNOSIS — I25.5 ISCHEMIC CARDIOMYOPATHY: ICD-10-CM

## 2018-11-12 DIAGNOSIS — Z95.0 PACEMAKER: ICD-10-CM

## 2018-11-12 DIAGNOSIS — Z00.00 ENCOUNTER FOR ROUTINE ADULT HEALTH EXAMINATION WITHOUT ABNORMAL FINDINGS: Primary | ICD-10-CM

## 2018-11-12 DIAGNOSIS — E78.5 HYPERLIPIDEMIA LDL GOAL <100: ICD-10-CM

## 2018-11-12 DIAGNOSIS — Z12.5 SCREENING FOR PROSTATE CANCER: ICD-10-CM

## 2018-11-12 DIAGNOSIS — R73.9 HYPERGLYCEMIA: ICD-10-CM

## 2018-11-12 DIAGNOSIS — N40.0 BENIGN PROSTATIC HYPERPLASIA WITHOUT LOWER URINARY TRACT SYMPTOMS: ICD-10-CM

## 2018-11-12 LAB
ERYTHROCYTE [DISTWIDTH] IN BLOOD BY AUTOMATED COUNT: 11.3 % (ref 10–15)
HBA1C MFR BLD: 5.2 % (ref 0–5.6)
HCT VFR BLD AUTO: 41.2 % (ref 40–53)
HGB BLD-MCNC: 14.6 G/DL (ref 13.3–17.7)
MCH RBC QN AUTO: 36.2 PG (ref 26.5–33)
MCHC RBC AUTO-ENTMCNC: 35.4 G/DL (ref 31.5–36.5)
MCV RBC AUTO: 102 FL (ref 78–100)
PLATELET # BLD AUTO: 148 10E9/L (ref 150–450)
RBC # BLD AUTO: 4.03 10E12/L (ref 4.4–5.9)
WBC # BLD AUTO: 6.9 10E9/L (ref 4–11)

## 2018-11-12 PROCEDURE — 82947 ASSAY GLUCOSE BLOOD QUANT: CPT | Performed by: PHYSICIAN ASSISTANT

## 2018-11-12 PROCEDURE — 83036 HEMOGLOBIN GLYCOSYLATED A1C: CPT | Performed by: PHYSICIAN ASSISTANT

## 2018-11-12 PROCEDURE — 85027 COMPLETE CBC AUTOMATED: CPT | Performed by: PHYSICIAN ASSISTANT

## 2018-11-12 PROCEDURE — G0439 PPPS, SUBSEQ VISIT: HCPCS | Performed by: PHYSICIAN ASSISTANT

## 2018-11-12 PROCEDURE — G0103 PSA SCREENING: HCPCS | Performed by: PHYSICIAN ASSISTANT

## 2018-11-12 PROCEDURE — 36415 COLL VENOUS BLD VENIPUNCTURE: CPT | Performed by: PHYSICIAN ASSISTANT

## 2018-11-12 NOTE — MR AVS SNAPSHOT
After Visit Summary   11/12/2018    Tae Arguelles    MRN: [id number]           Patient Information     Date Of Birth          1950        Visit Information        Provider Department      11/12/2018 11:30 AM Janet Alcaraz PA-C [facility]        Today's Diagnoses     Encounter for routine adult health examination without abnormal findings    -  1    Ischemic cardiomyopathy        Hyperlipidemia LDL goal <100        Pacemaker        Hyperglycemia        Benign prostatic hyperplasia without lower urinary tract symptoms        Screening for prostate cancer          Care Instructions      Preventive Health Recommendations:     See your health care provider every year to    Review health changes.     Discuss preventive care.      Review your medicines if your doctor has prescribed any.    Talk with your health care provider about whether you should have a test to screen for prostate cancer (PSA).    Every 3 years, have a diabetes test (fasting glucose). If you are at risk for diabetes, you should have this test more often.    Every 5 years, have a cholesterol test. Have this test more often if you are at risk for high cholesterol or heart disease.     Every 10 years, have a colonoscopy. Or, have a yearly FIT test (stool test). These exams will check for colon cancer.    Talk to with your health care provider about screening for Abdominal Aortic Aneurysm if you have a family history of AAA or have a history of smoking.  Shots:     Get a flu shot each year.     Get a tetanus shot every 10 years.     Talk to your doctor about your pneumonia vaccines. There are now two you should receive - Pneumovax (PPSV 23) and Prevnar (PCV 13).    Talk to your pharmacist about a shingles vaccine.     Talk to your doctor about the hepatitis B vaccine.  Nutrition:     Eat at least 5 servings of fruits and vegetables each day.     Eat whole-grain bread, whole-wheat pasta and brown rice instead of  white grains and rice.     Get adequate Calcium and Vitamin D.   Lifestyle    Exercise for at least 150 minutes a week (30 minutes a day, 5 days a week). This will help you control your weight and prevent disease.     Limit alcohol to one drink per day.     No smoking.     Wear sunscreen to prevent skin cancer.     See your dentist every six months for an exam and cleaning.     See your eye doctor every 1 to 2 years to screen for conditions such as glaucoma, macular degeneration and cataracts.    Personalized Prevention Plan  You are due for the preventive services outlined below.  Your care team is available to assist you in scheduling these services.  If you have already completed any of these items, please share that information with your care team to update in your medical record.    Health Maintenance Due   Topic Date Due     Medicare Annual Wellness Visit  10/16/1966     AORTIC ANEURYSM SCREENING (SYSTEM ASSIGNED)  10/16/2013     Heart Failure Action Plan reviewed - yearly  03/26/2014     Cholesterol Lab - yearly  05/27/2016     Discuss Advance Directive Planning  03/26/2018     Basic Metabolic Lab - every 6 months  05/07/2018     Flu Vaccine (1) 09/01/2018     Liver Monitoring Lab - yearly  11/07/2018     FALL RISK ASSESSMENT  11/07/2018     Depression Assessment 2 - yearly  11/07/2018       Shingrex is the new shingles vaccine.          Follow-ups after your visit        Additional Services     INR CLINIC REFERRAL       Your provider has referred you to INR Services.    Please be aware that coverage of these services is subject to the terms and limitations of your health insurance plan.  Call member services at your health plan with any benefit or coverage questions.    Indication for Anticoagulation: Cardiomyopathy  If nonstandard INR is desired, indicate goal range and explanation:   Expected Duration of Therapy: Lifetime                  Who to contact     If you have questions or need follow up  "information about today's clinic visit or your schedule please contact [facility] directly at [phone].  Normal or non-critical lab and imaging results will be communicated to you by MyChart, letter or phone within 4 business days after the clinic has received the results. If you do not hear from us within 7 days, please contact the clinic through MyChart or phone. If you have a critical or abnormal lab result, we will notify you by phone as soon as possible.  Submit refill requests through [employer] or call your pharmacy and they will forward the refill request to us. Please allow 3 business days for your refill to be completed.          Additional Information About Your Visit        Care EveryWhere ID     This is your Care EveryWhere ID. This could be used by other organizations to access your [facility] medical records  [id number]        Your Vitals Were     Pulse Temperature Height Pulse Oximetry BMI (Body Mass Index)       80 98.5  F (36.9  C) (Tympanic) 5' 7.5\" (1.715 m) 96% 27.93 kg/m2        Blood Pressure from Last 3 Encounters:   11/12/18 116/65   11/07/17 125/70   04/17/17 134/71    Weight from Last 3 Encounters:   11/12/18 181 lb (82.1 kg)   11/07/17 181 lb (82.1 kg)   04/17/17 191 lb 1.6 oz (86.7 kg)              We Performed the Following     CBC with platelets     Glucose     Hemoglobin A1c     INR CLINIC REFERRAL     Prostate spec antigen screen        Primary Care Provider Office Phone # Fax #    Janet Alcaraz PA-C [phone] [address] [facility] [address]  [facility] [address]        Equal Access to Services     NATE GONCALVES : Hadii anne Santana, waaxda luyun, qaybta kaaljunior swenson. So [facility] [phone].    ATENCIÓN: Si habla español, tiene a otto disposición servicios gratuitos de asistencia lingüística. Michi al [phone].    We comply with applicable federal civil rights laws and [place] laws. We do not " discriminate on the basis of race, color, national origin, age, disability, sex, sexual orientation, or gender identity.            Thank you!     Thank you for choosing [facility]  for your care. Our goal is always to provide you with excellent care. Hearing back from our patients is one way we can continue to improve our services. Please take a few minutes to complete the written survey that you may receive in the mail after your visit with us. Thank you!             Your Updated Medication List - Protect others around you: Learn how to safely use, store and throw away your medicines at www.disposemymeds.org.          This list is accurate as of 11/12/18 12:02 PM.  Always use your most recent med list.                   Brand Name Dispense Instructions for use Diagnosis    ASPIRIN NOT PRESCRIBED    INTENTIONAL    0 each    81 each continuous prn. Antiplatelet medication not prescribed intentionally due to Current anticoagulant therapy (warfarin/enoxaparin)    CAD (coronary artery disease)       fluocinolone 0.01 % solution    SYNALAR    60 mL    PRN    Dermatitis       furosemide 40 MG tablet    LASIX     1 TABLET DAILY        LIPITOR 20 MG tablet   Generic drug:  atorvastatin      1 TABLET DAILY        lisinopril 10 MG tablet    PRINIVIL/ZESTRIL     1 TABLET DAILY        metoprolol tartrate 50 MG tablet    LOPRESSOR    180 tablet    Take 1 tablet by mouth 2 times daily.        Multi-vitamin Tabs tablet      Take 1 tablet by mouth daily.        Niacin 750 MG Tbcr      2 tabs qhs        nitroGLYcerin 0.4 MG sublingual tablet    NITROSTAT     Place  under the tongue every 5 minutes as needed.        vitamin D 1000 units capsule      Take 1 capsule by mouth daily.        * warfarin 5 MG tablet    COUMADIN     Take 2.5 mg by mouth Tuesday only        * warfarin 7.5 MG tablet    COUMADIN     Take by mouth daily Sunday, Monday, Wed,Thurs, Friday, Sat        * Notice:  This list has 2 medication(s) that are  the same as other medications prescribed for you. Read the directions carefully, and ask your doctor or other care provider to review them with you.

## 2018-11-12 NOTE — PROGRESS NOTES
"  SUBJECTIVE:   Tae Arguelles is a 68 year old male who presents for Preventive Visit.    Needs to start coming to our INR clinic since [facility] told him they could no longer follow him  On coumadin for cardiomyopathy  He was on his treadmill and felt faint and his defibrillator fired leading to syncope  Under orders to not drive for the next few months because of this  Not able to find that note on CareEverywhere, but can see labs and echo  Had some R chest and knee injury from trying to hold on to the treadmill  He had an echo showing EF of 32%. Also had a pharmacological stress test neg for ischemia  Has appt with Dr. Alva on 11/28/18  He had labs done last month at [facility] which are reviewed.      Are you in the first 12 months of your Medicare Part B coverage?  No    Physical Health:    In general, how would you rate your overall physical health? good    Outside of work, how many days during the week do you exercise? 4-5 days/week    Outside of work, approximately how many minutes a day do you exercise?30-45 minutes    If you drink alcohol do you typically have >3 drinks per day or >7 drinks per week? No    Do you usually eat at least 4 servings of fruit and vegetables a day, include whole grains & fiber and avoid regularly eating high fat or \"junk\" foods? Yes    Do you have any problems taking medications regularly?  NO    Do you have any side effects from medications?  NO    Needs assistance for the following daily activities: transportation temporarily  Until cleared by cardiologist     Which of the following safety concerns are present in your home?:  lack of grab bars in the bathroom     Hearing impairment: No    In the past 6 months, have you been bothered by leaking of urine? no    Mental Health:    In general, how would you rate your overall mental or emotional health? excellent  PHQ-2 Score:      Additional concerns to address?  No    Fall risk:               COGNITIVE SCREEN  1) Repeat 3 items " (Leader, Season, Table)    2) Clock draw: NORMAL  3) 3 item recall: Recalls 2 objects  Results: NORMAL clock, 1-2 items recalled: COGNITIVE IMPAIRMENT LESS LIKELY    Mini-CogTM Copyright S Lala. Licensed by the author for use in [facility]; reprinted with permission (tere@[facility]). All rights reserved.          Reviewed and updated as needed this visit by clinical staff  Tobacco         Reviewed and updated as needed this visit by Provider        Social History   Substance Use Topics     Smoking status: Former Smoker     Quit date: 10/23/1997     Smokeless tobacco: Never Used     Alcohol use 0.0 oz/week     0 Standard drinks or equivalent per week      Comment: 13 drinks a week                             Do you feel safe in your environment - Yes    Do you have a Health Care Directive?: No: Advance care planning reviewed with patient; information given to patient to review.    Current providers sharing in care for this patient include:   Patient Care Team:  Janet Alcaraz PA-C as PCP - General (Internal Medicine)    The following health maintenance items are reviewed in Epic and correct as of today:  Health Maintenance   Topic Date Due     MEDICARE ANNUAL WELLNESS VISIT  10/16/1966     AORTIC ANEURYSM SCREENING (SYSTEM ASSIGNED)  10/16/2013     HF ACTION PLAN Q1 YR  03/26/2014     LIPID MONITORING Q1 YEAR  05/27/2016     ADVANCE DIRECTIVE PLANNING Q5 YRS  03/26/2018     BMP Q6 MOS  05/07/2018     INFLUENZA VACCINE (1) 09/01/2018     ALT Q1 YR  11/07/2018     FALL RISK ASSESSMENT  11/07/2018     PHQ-2 Q1 YR  11/07/2018     CBC Q1 YR  02/15/2019     TETANUS IMMUNIZATION (SYSTEM ASSIGNED)  03/26/2023     COLON CANCER SCREEN (SYSTEM ASSIGNED)  02/12/2025     PNEUMOCOCCAL  Completed     HEPATITIS C SCREENING  Completed     Past Medical History:   Diagnosis Date     BPH (benign prostatic hyperplasia)     Dr. Whitfield, PSA 0.27 12/2012     CAD (coronary artery disease) 1997    MI      CHF (congestive  heart failure) (H)      Colon polyp      History of smoking     Quit 1997     HTN (hypertension), benign      Hyperlipidemia LDL goal <100      Ischemic cardiomyopathy     Dr. Alva     Lichen planus      Normal colonoscopy 1-3-08    Repeat 5-10 years     Pacemaker 1997, 2005    ventricular tachycardia     Pleural effusion      Pneumonia 1998     Pneumothorax     plus rib fx secondary to fall (L)     Seborrheic dermatitis      Toe fracture, right      Past Surgical History:   Procedure Laterality Date     C CABG, ARTERIAL, TWO  1997     COLONOSCOPY N/A 2/12/2015    Procedure: COLONOSCOPY;  Surgeon: Zac Austin MD;  Location:  GI     MOUTH SURGERY  2001    Upper and lower dentures     Current Outpatient Prescriptions   Medication Sig Dispense Refill     ASPIRIN NOT PRESCRIBED, INTENTIONAL, 81 each continuous prn. Antiplatelet medication not prescribed intentionally due to Current anticoagulant therapy (warfarin/enoxaparin) 0 each 0     Cholecalciferol (VITAMIN D) 1000 UNITS capsule Take 1 capsule by mouth daily.       fluocinolone (SYNALAR) 0.01 % solution PRN 60 mL 1     FUROSEMIDE 40 MG OR TABS 1 TABLET DAILY       LIPITOR 20 MG OR TABS 1 TABLET DAILY       LISINOPRIL 10 MG OR TABS 1 TABLET DAILY       metoprolol (LOPRESSOR) 50 MG tablet Take 1 tablet by mouth 2 times daily. 180 tablet 3     multivitamin, therapeutic with minerals (MULTI-VITAMIN) TABS Take 1 tablet by mouth daily.       NIACIN 750 MG OR TBCR 2 tabs qhs       nitroglycerin (NITROSTAT) 0.4 MG SL tablet Place  under the tongue every 5 minutes as needed.       warfarin (COUMADIN) 5 MG tablet Take 2.5 mg by mouth Tuesday only       warfarin (COUMADIN) 7.5 MG tablet Take by mouth daily Sunday, Monday, Wed,Thurs, Friday, Sat         ROS:  Constitutional, HEENT, cardiovascular, pulmonary, GI, , musculoskeletal, neuro, skin, endocrine and psych systems are negative, except as otherwise noted.    OBJECTIVE:   /65 (BP Location: Right  "arm, Patient Position: Chair, Cuff Size: Adult Regular)  Pulse 80  Temp 98.5  F (36.9  C) (Tympanic)  Ht 5' 7.5\" (1.715 m)  Wt 181 lb (82.1 kg)  SpO2 96%  BMI 27.93 kg/m2 Estimated body mass index is 27.93 kg/(m^2) as calculated from the following:    Height as of this encounter: 5' 7.5\" (1.715 m).    Weight as of this encounter: 181 lb (82.1 kg).  EXAM:   GENERAL: healthy, alert and no distress  EYES: Eyes grossly normal to inspection, PERRL and conjunctivae and sclerae normal  HENT: ear canals with bilat cerumen removed with cerumen spoont, TM's normal, nose and mouth without ulcers or lesions  NECK: no adenopathy, no asymmetry, masses, or scars and thyroid normal to palpation  RESP: lungs clear to auscultation - no rales, rhonchi or wheezes  CV: regular rate and rhythm, normal S1 S2, no S3 or S4, no murmur, click or rub, no peripheral edema and peripheral pulses strong  ABDOMEN: soft, nontender, no hepatosplenomegaly, no masses and bowel sounds normal  MS: no gross musculoskeletal defects noted, no edema  Rectal: deferred, pt followed by urology (Dr. Whitfield)  SKIN: no suspicious lesions or rashes  NEURO: Normal strength and tone, mentation intact and speech normal  PSYCH: mentation appears normal, affect normal/bright        ASSESSMENT / PLAN:   Assessment and Plan:     (Z00.00) Encounter for routine adult health examination without abnormal findings  (primary encounter diagnosis)  Comment: colonoscopy up to date.  Plan: CBC with platelets, Glucose        Discussed shingrex. He plans to have flu shot at pharmacy.    (I25.5) Ischemic cardiomyopathy  Comment:   Plan: INR CLINIC REFERRAL        Followed by Dr. Alva    (E78.5) Hyperlipidemia LDL goal <100  Comment:   Plan: see Careeverywhere for results    (Z95.0) Pacemaker  Comment: recently fired so not allowed to drive for now  Plan: followed by EP at [facility]    (R73.9) Hyperglycemia  Comment:   Plan: Glucose, Hemoglobin A1c            (N40.0) Benign " "prostatic hyperplasia without lower urinary tract symptoms  Comment:   Plan: will fax his psa to Dr. Whitfield    (Z12.5) Screening for prostate cancer  Comment:   Plan: Prostate spec antigen screen              End of Life Planning:  Patient currently has an advanced directive: No.  I have verified the patient's ablity to prepare an advanced directive/make health care decisions.  Literature was provided to assist patient in preparing an advanced directive.    COUNSELING:  Reviewed preventive health counseling, as reflected in patient instructions    BP Readings from Last 1 Encounters:   11/12/18 116/65     Estimated body mass index is 27.93 kg/(m^2) as calculated from the following:    Height as of this encounter: 5' 7.5\" (1.715 m).    Weight as of this encounter: 181 lb (82.1 kg).           reports that he quit smoking about 21 years ago. He has never used smokeless tobacco.      Appropriate preventive services were discussed with this patient, including applicable screening as appropriate for cardiovascular disease, diabetes, osteopenia/osteoporosis, and glaucoma.  As appropriate for age/gender, discussed screening for colorectal cancer, prostate cancer, breast cancer, and cervical cancer. Checklist reviewing preventive services available has been given to the patient.    Reviewed patients plan of care and provided an AVS. The Basic Care Plan (routine screening as documented in Health Maintenance) for Tae meets the Care Plan requirement. This Care Plan has been established and reviewed with the Patient.    Counseling Resources:  ATP IV Guidelines  Pooled Cohorts Equation Calculator  Breast Cancer Risk Calculator  FRAX Risk Assessment  ICSI Preventive Guidelines  Dietary Guidelines for Americans, 2010  USDA's MyPlate  ASA Prophylaxis  Lung CA Screening    Janet Alcaraz PA-C  [facility]  "

## 2018-11-12 NOTE — LETTER
[facility]  [address] [facility]  [address]  [place]  [address]  Tel: [phone]    November 14, 2018    Tae Arguelles  [address] [facility] [address]        Dear Mr. Arguelles,    Your PSA was normal at 0.61.  Your blood sugar was little borderline at 107 but your hemoglobin A1c was in the NON diabetic range at 5.2% so that's good.  Your white blood cell count and hemoglobin are normal.  Your platelets have improved and almost in normal range now.    Let me know if you have any questions.    Sincerely,    Janet Alcaraz PA-C/DUC          Enclosure: Lab Results  Results for orders placed or performed in visit on 11/12/18   CBC with platelets   Result Value Ref Range    WBC 6.9 4.0 - 11.0 10e9/L    RBC Count 4.03 (L) 4.4 - 5.9 10e12/L    Hemoglobin 14.6 13.3 - 17.7 g/dL    Hematocrit 41.2 40.0 - 53.0 %     (H) 78 - 100 fl    MCH 36.2 (H) 26.5 - 33.0 pg    MCHC 35.4 31.5 - 36.5 g/dL    RDW 11.3 10.0 - 15.0 %    Platelet Count 148 (L) 150 - 450 10e9/L   Glucose   Result Value Ref Range    Glucose 107 (H) 70 - 99 mg/dL   Hemoglobin A1c   Result Value Ref Range    Hemoglobin A1C 5.2 0 - 5.6 %   Prostate spec antigen screen   Result Value Ref Range    PSA 0.61 0 - 4 ug/L

## 2018-11-12 NOTE — PATIENT INSTRUCTIONS
Preventive Health Recommendations:     See your health care provider every year to    Review health changes.     Discuss preventive care.      Review your medicines if your doctor has prescribed any.    Talk with your health care provider about whether you should have a test to screen for prostate cancer (PSA).    Every 3 years, have a diabetes test (fasting glucose). If you are at risk for diabetes, you should have this test more often.    Every 5 years, have a cholesterol test. Have this test more often if you are at risk for high cholesterol or heart disease.     Every 10 years, have a colonoscopy. Or, have a yearly FIT test (stool test). These exams will check for colon cancer.    Talk to with your health care provider about screening for Abdominal Aortic Aneurysm if you have a family history of AAA or have a history of smoking.  Shots:     Get a flu shot each year.     Get a tetanus shot every 10 years.     Talk to your doctor about your pneumonia vaccines. There are now two you should receive - Pneumovax (PPSV 23) and Prevnar (PCV 13).    Talk to your pharmacist about a shingles vaccine.     Talk to your doctor about the hepatitis B vaccine.  Nutrition:     Eat at least 5 servings of fruits and vegetables each day.     Eat whole-grain bread, whole-wheat pasta and brown rice instead of white grains and rice.     Get adequate Calcium and Vitamin D.   Lifestyle    Exercise for at least 150 minutes a week (30 minutes a day, 5 days a week). This will help you control your weight and prevent disease.     Limit alcohol to one drink per day.     No smoking.     Wear sunscreen to prevent skin cancer.     See your dentist every six months for an exam and cleaning.     See your eye doctor every 1 to 2 years to screen for conditions such as glaucoma, macular degeneration and cataracts.    Personalized Prevention Plan  You are due for the preventive services outlined below.  Your care team is available to assist you in  scheduling these services.  If you have already completed any of these items, please share that information with your care team to update in your medical record.    Health Maintenance Due   Topic Date Due     Medicare Annual Wellness Visit  10/16/1966     AORTIC ANEURYSM SCREENING (SYSTEM ASSIGNED)  10/16/2013     Heart Failure Action Plan reviewed - yearly  03/26/2014     Cholesterol Lab - yearly  05/27/2016     Discuss Advance Directive Planning  03/26/2018     Basic Metabolic Lab - every 6 months  05/07/2018     Flu Vaccine (1) 09/01/2018     Liver Monitoring Lab - yearly  11/07/2018     FALL RISK ASSESSMENT  11/07/2018     Depression Assessment 2 - yearly  11/07/2018       Shingrex is the new shingles vaccine.

## 2018-11-13 ENCOUNTER — TELEPHONE (OUTPATIENT)
Dept: FAMILY MEDICINE | Facility: CLINIC | Age: 68
End: 2018-11-13

## 2018-11-13 LAB
GLUCOSE SERPL-MCNC: 107 MG/DL (ref 70–99)
PSA SERPL-ACNC: 0.61 UG/L (ref 0–4)

## 2018-11-13 NOTE — TELEPHONE ENCOUNTER
Reason for Call:  Other FYI    Detailed comments: PT WIFE CALLING TO TELL YANCI LAWSON THAT HER  GOT HIS FLU SHOT    Phone Number Patient can be reached at: Home number on file [phone] (home)    Best Time: ANYTIME    Can we leave a detailed message on this number? YES    Call taken on 11/13/2018 at 3:29 PM by Feli Vogt

## 2018-11-14 NOTE — PROGRESS NOTES
Osman,    Your PSA was normal at 0.61.  Your blood sugar was little borderline at 107 but your hemoglobin A1c was in the NON diabetic range at 5.2% so that's good.  Your white blood cell count and hemoglobin are normal.  Your platelets have improved and almost in normal range now.    Let me know if you have any questions.    Janet Alcaraz PA-C

## 2018-11-21 ENCOUNTER — ANTICOAGULATION THERAPY VISIT (OUTPATIENT)
Dept: NURSING | Facility: CLINIC | Age: 68
End: 2018-11-21
Payer: MEDICARE

## 2018-11-21 LAB — INR POINT OF CARE: 2.4 (ref 0.86–1.14)

## 2018-11-21 NOTE — PROGRESS NOTES
ANTICOAGULATION INITIAL CLINIC VISIT    Patient Name:  Tae Arguelles  Date:  11/21/2018  Referred by: Janet Miranda  Contact Type:  Face to Face    SUBJECTIVE:  Coumadin education was completed today.  Topics covered include:  -Introduction to coumadin  -Proper Administration  -INR Testing  -Sign/Symptoms of Bleeding  -Signs/Symptoms of Clot Formation or Stroke  -Dietary Intake of Vitamin K  -Drug Interactions  -Anticoagulation Identification (bracelet, necklace or wallet card)  -Future Surgery  -Effects of Alcohol, Tobacco, and Exercise on Coumadin    Coumadin Education Booklet and Coumadin Identification Wallet Card were given to the patient.         OBJECTIVE    INR Protime   Date Value Ref Range Status   11/21/2018 2.4 (A) 0.86 - 1.14 Final       ASSESSMENT / PLAN  No question data found.  Anticoagulation Summary as of 11/21/2018     INR goal 2.0-3.0    Today's INR 2.4    Warfarin maintenance plan 7.5 mg (5 mg x 1.5) on Mon, Wed, Fri; 5 mg (5 mg x 1) all other days    Full warfarin instructions 7.5 mg on Mon, Wed, Fri; 5 mg all other days    Weekly warfarin total 42.5 mg    Plan last modified Soco Louise RN (11/21/2018)    Next INR check 12/19/2018    Target end date       Anticoagulation Episode Summary     INR check location     Preferred lab     Send INR reminders to [facility] ANTICOAGULATION    Comments       Anticoagulation Care Providers     Provider Role Specialty Phone number    Janet Miranda PA-C [facility] Internal Medicine [phone]            See the Encounter Report to view Anticoagulation Flowsheet and Dosing Calendar (Go to Encounters tab in chart review, and find the Anticoagulation Therapy Visit)    Dosage adjustment made based on physician directed care plan.  PT switching over from patricia INR.  Can see past 4x inr in range, pt has been on coumadin for 21 years.  Verified dosing with pt, scheduled recheck in 4 weeks, and instructed pt to call if any concerns beforehand.    Pt aware if  signs of clotting (pain, tenderness, swelling, color change in leg or arm, SOB) and bleeding occur (blood in stool, urine, large bruising, bleeding gums, nosebleeds) to have INR check sooner. If sx severe report to ER or concerned for stroke call 911. If general questions or concerns arise, call clinic.         Soco Louise RN

## 2018-11-21 NOTE — MR AVS SNAPSHOT
Tae Arguelles   11/21/2018 11:00 AM   Anticoagulation Therapy Visit    Description:  68 year old male   Provider:   ANTICOAGULATION CLINIC   Department:   Nurse           INR as of 11/21/2018     Today's INR 2.4      Anticoagulation Summary as of 11/21/2018     INR goal 2.0-3.0    Today's INR 2.4    Full warfarin instructions 7.5 mg on Mon, Wed, Fri; 5 mg all other days    Next INR check 12/19/2018      Your next Anticoagulation Clinic appointment(s)     Dec 19, 2018 11:15 AM CST   Anticoagulation Visit with  ANTICOAGULATION CLINIC   [facility]    [address]  [place] [address]   [phone]              Contact Numbers     Clinic Number:         November 2018 Details    Sun Mon Tue Wed Thu Fri Sat         1               2               3                 4               5               6               7               8               9               10                 11               12               13               14               15               16               17                 18               19               20               21      7.5 mg   See details      22      5 mg         23      7.5 mg         24      5 mg           25      5 mg         26      7.5 mg         27      5 mg         28      7.5 mg         29      5 mg         30      7.5 mg           Date Details   11/21 This INR check               How to take your warfarin dose     To take:  5 mg Take 1 of the 5 mg tablets.    To take:  7.5 mg Take 1.5 of the 5 mg tablets.           December 2018 Details    Sun Mon Tue Wed Thu Fri Sat           1      5 mg           2      5 mg         3      7.5 mg         4      5 mg         5      7.5 mg         6      5 mg         7      7.5 mg         8      5 mg           9      5 mg         10      7.5 mg         11      5 mg         12      7.5 mg         13      5 mg         14      7.5 mg         15      5 mg           16      5 mg         17       7.5 mg         18      5 mg         19            20               21               22                 23               24               25               26               27               28               29                 30               31                     Date Details   No additional details    Date of next INR:  12/19/2018         How to take your warfarin dose     To take:  5 mg Take 1 of the 5 mg tablets.    To take:  7.5 mg Take 1.5 of the 5 mg tablets.

## 2018-11-28 ENCOUNTER — TRANSFERRED RECORDS (OUTPATIENT)
Dept: HEALTH INFORMATION MANAGEMENT | Facility: CLINIC | Age: 68
End: 2018-11-28

## 2018-12-19 ENCOUNTER — ANTICOAGULATION THERAPY VISIT (OUTPATIENT)
Dept: NURSING | Facility: CLINIC | Age: 68
End: 2018-12-19
Payer: MEDICARE

## 2018-12-19 LAB — INR POINT OF CARE: 2.7 (ref 0.86–1.14)

## 2018-12-19 PROCEDURE — 85610 PROTHROMBIN TIME: CPT | Mod: QW

## 2018-12-19 PROCEDURE — 36416 COLLJ CAPILLARY BLOOD SPEC: CPT

## 2018-12-19 NOTE — PROGRESS NOTES
ANTICOAGULATION FOLLOW-UP CLINIC VISIT    Patient Name:  Tae Arguelles  Date:  2018  Contact Type:  Face to Face    SUBJECTIVE:        OBJECTIVE    INR Protime   Date Value Ref Range Status   2018 2.7 (A) 0.86 - 1.14 Final       ASSESSMENT / PLAN  INR assessment THER    Recheck INR In: 5 WEEKS    INR Location Clinic      Anticoagulation Summary  As of 2018    INR goal:   2.0-3.0   TTR:   100.0 % (2.6 wk)   INR used for dosin.7 (2018)   Warfarin maintenance plan:   7.5 mg (5 mg x 1.5) every Mon, Wed, Fri; 5 mg (5 mg x 1) all other days   Full warfarin instructions:   7.5 mg every Mon, Wed, Fri; 5 mg all other days   Weekly warfarin total:   42.5 mg   No change documented:   Soco Louise RN   Plan last modified:   Soco Louise RN (2018)   Next INR check:   2019   Target end date:            Anticoagulation Episode Summary     INR check location:       Preferred lab:       Send INR reminders to:    ANTICOAGULATION    Comments:         Anticoagulation Care Providers     Provider Role Specialty Phone number    Janet Alcaraz PA-C Responsible Internal Medicine [phone]            See the Encounter Report to view Anticoagulation Flowsheet and Dosing Calendar (Go to Encounters tab in chart review, and find the Anticoagulation Therapy Visit)    Dosage adjustment made based on physician directed care plan.  Pt aware if signs of clotting (pain, tenderness, swelling, color change in leg or arm, SOB) and bleeding occur (blood in stool, urine, large bruising, bleeding gums, nosebleeds) to have INR check sooner. If sx severe report to ER or concerned for stroke call 911. If general questions or concerns arise, call clinic.         Soco Louise RN

## 2019-01-22 ENCOUNTER — ANTICOAGULATION THERAPY VISIT (OUTPATIENT)
Dept: NURSING | Facility: CLINIC | Age: 69
End: 2019-01-22
Payer: MEDICARE

## 2019-01-22 LAB — INR POINT OF CARE: 3.4 (ref 0.86–1.14)

## 2019-01-22 PROCEDURE — 36416 COLLJ CAPILLARY BLOOD SPEC: CPT

## 2019-01-22 PROCEDURE — 85610 PROTHROMBIN TIME: CPT | Mod: QW

## 2019-01-22 NOTE — PROGRESS NOTES
ANTICOAGULATION FOLLOW-UP CLINIC VISIT    Patient Name:  Tae Arguelles  Date:  1/22/2019  Contact Type:  Face to Face    SUBJECTIVE:     Patient Findings     Positives:   Unexplained INR or factor level change           OBJECTIVE    INR Protime   Date Value Ref Range Status   01/22/2019 3.4 (A) 0.86 - 1.14 Final       ASSESSMENT / PLAN  INR assessment SUPRA    Recheck INR In: 2 WEEKS    INR Location Clinic      Anticoagulation Summary  As of 1/22/2019    INR goal:   2.0-3.0   TTR:   62.6 % (1.7 mo)   INR used for dosing:   3.4! (1/22/2019)   Warfarin maintenance plan:   7.5 mg (5 mg x 1.5) every Mon, Fri; 5 mg (5 mg x 1) all other days   Full warfarin instructions:   7.5 mg every Mon, Fri; 5 mg all other days   Weekly warfarin total:   40 mg   Plan last modified:   Yajaira Bledsoe RN (1/22/2019)   Next INR check:   2/5/2019   Target end date:            Anticoagulation Episode Summary     INR check location:       Preferred lab:       Send INR reminders to:    ANTICOAGULATION    Comments:         Anticoagulation Care Providers     Provider Role Specialty Phone number    Janet Alcaraz PA-C Responsible Internal Medicine [phone]            See the Encounter Report to view Anticoagulation Flowsheet and Dosing Calendar (Go to Encounters tab in chart review, and find the Anticoagulation Therapy Visit)    Pt is 3.4 today. Will have pt take 7.5 mg every Monday and Friday and 5 mg all the other days. Recheck in 2 weeks.Pt given the vitamin K handout about foods. Pt denies any changes in health,diet,activity or medication. Tae aware if signs of clotting (pain, tenderness, swelling, color change in leg or arm, SOB) and bleeding occur (blood in stool, urine, large bruising, bleeding gums, nosebleeds) to have INR check sooner. If sx severe report to ER or concerned for stroke call 911. If general questions or concerns arise, call clinic.         Yajaira Bledsoe, RN

## 2019-02-05 ENCOUNTER — ANTICOAGULATION THERAPY VISIT (OUTPATIENT)
Dept: NURSING | Facility: CLINIC | Age: 69
End: 2019-02-05
Payer: MEDICARE

## 2019-02-05 DIAGNOSIS — I25.5 ISCHEMIC CARDIOMYOPATHY: Primary | ICD-10-CM

## 2019-02-05 LAB — INR POINT OF CARE: 2.2 (ref 0.86–1.14)

## 2019-02-05 PROCEDURE — 36416 COLLJ CAPILLARY BLOOD SPEC: CPT

## 2019-02-05 PROCEDURE — 85610 PROTHROMBIN TIME: CPT | Mod: QW

## 2019-02-05 PROCEDURE — 99207 ZZC NO CHARGE NURSE ONLY: CPT

## 2019-02-05 RX ORDER — WARFARIN SODIUM 5 MG/1
TABLET ORAL
Qty: 135 TABLET | Refills: 0 | Status: SHIPPED | OUTPATIENT
Start: 2019-02-05 | End: 2019-05-07

## 2019-02-05 NOTE — PROGRESS NOTES
ANTICOAGULATION FOLLOW-UP CLINIC VISIT    Patient Name:  Tae Arguelles  Date:  2019  Contact Type:  Face to Face    SUBJECTIVE:     Patient Findings     Positives:   No Problem Findings           OBJECTIVE    INR Protime   Date Value Ref Range Status   2019 2.2 (A) 0.86 - 1.14 Final       ASSESSMENT / PLAN  INR assessment THER    Recheck INR In: 2 WEEKS    INR Location Clinic      Anticoagulation Summary  As of 2019    INR goal:   2.0-3.0   TTR:   63.5 % (2.2 mo)   INR used for dosin.2 (2019)   Warfarin maintenance plan:   7.5 mg (5 mg x 1.5) every Mon, Fri; 5 mg (5 mg x 1) all other days   Full warfarin instructions:   7.5 mg every Mon, Fri; 5 mg all other days   Weekly warfarin total:   40 mg   No change documented:   Yajaira Bledsoe RN   Plan last modified:   Yajaira Bledsoe RN (2019)   Next INR check:   2019   Target end date:            Anticoagulation Episode Summary     INR check location:       Preferred lab:       Send INR reminders to:   [facility]    Comments:         Anticoagulation Care Providers     Provider Role Specialty Phone number    Janet Alcaraz PA-C Responsible Internal Medicine [phone]            See the Encounter Report to view Anticoagulation Flowsheet and Dosing Calendar (Go to Encounters tab in chart review, and find the Anticoagulation Therapy Visit)    Pt is 2.2 today. Will have pt continue maintenance dose of 7.5 mg every Monday and Friday and 5 mg all the other days. Recheck in 2 weeks. Pt denies any changes in health,diet,medication or activity. Warfarin refilled and sent to pt pharmacy as requested. Tae aware if signs of clotting (pain, tenderness, swelling, color change in leg or arm, SOB) and bleeding occur (blood in stool, urine, large bruising, bleeding gums, nosebleeds) to have INR check sooner. If sx severe report to ER or concerned for stroke call 911. If general questions or concerns arise, call  clinic.         Yajaira Bledsoe RN

## 2019-02-19 ENCOUNTER — ANTICOAGULATION THERAPY VISIT (OUTPATIENT)
Dept: NURSING | Facility: CLINIC | Age: 69
End: 2019-02-19
Payer: MEDICARE

## 2019-02-19 DIAGNOSIS — I25.5 ISCHEMIC CARDIOMYOPATHY: ICD-10-CM

## 2019-02-19 LAB — INR POINT OF CARE: 2 (ref 0.86–1.14)

## 2019-02-19 PROCEDURE — 36416 COLLJ CAPILLARY BLOOD SPEC: CPT

## 2019-02-19 PROCEDURE — 85610 PROTHROMBIN TIME: CPT | Mod: QW

## 2019-02-19 NOTE — PROGRESS NOTES
ANTICOAGULATION FOLLOW-UP CLINIC VISIT    Patient Name:  Tae Arguelles  Date:  2019  Contact Type:  Face to Face    SUBJECTIVE:     Patient Findings     Positives:   No Problem Findings           OBJECTIVE    INR Protime   Date Value Ref Range Status   2019 2.0 (A) 0.86 - 1.14 Final       ASSESSMENT / PLAN  INR assessment THER    Recheck INR In: 3 WEEKS    INR Location Clinic      Anticoagulation Summary  As of 2019    INR goal:   2.0-3.0   TTR:   69.9 % (2.7 mo)   INR used for dosin.0 (2019)   Warfarin maintenance plan:   7.5 mg (5 mg x 1.5) every Mon, Wed, Fri; 5 mg (5 mg x 1) all other days   Full warfarin instructions:   7.5 mg every Mon, Wed, Fri; 5 mg all other days   Weekly warfarin total:   42.5 mg   Plan last modified:   Sophia Nguyen RN (2019)   Next INR check:   3/12/2019   Target end date:            Anticoagulation Episode Summary     INR check location:       Preferred lab:       Send INR reminders to:    [facility]    Comments:         Anticoagulation Care Providers     Provider Role Specialty Phone number    Janet Alcaraz PA-C Responsible Internal Medicine [phone]            See the Encounter Report to view Anticoagulation Flowsheet and Dosing Calendar (Go to Encounters tab in chart review, and find the Anticoagulation Therapy Visit)    Dosage adjustment made based on physician directed care plan.  INR 2.0.   He eats greens about twice weekly.  Would like to increase his dose back to previous: 7.5 mg MWF, 5 mg ROW.  This is reasonable.    Sophia Nguyen RN

## 2019-03-12 ENCOUNTER — ANTICOAGULATION THERAPY VISIT (OUTPATIENT)
Dept: NURSING | Facility: CLINIC | Age: 69
End: 2019-03-12
Payer: MEDICARE

## 2019-03-12 DIAGNOSIS — I25.5 ISCHEMIC CARDIOMYOPATHY: ICD-10-CM

## 2019-03-12 LAB — INR POINT OF CARE: 2.1 (ref 0.86–1.14)

## 2019-03-12 PROCEDURE — 99207 ZZC NO CHARGE NURSE ONLY: CPT

## 2019-03-12 PROCEDURE — 36416 COLLJ CAPILLARY BLOOD SPEC: CPT

## 2019-03-12 PROCEDURE — 85610 PROTHROMBIN TIME: CPT | Mod: QW

## 2019-03-12 NOTE — PROGRESS NOTES
ANTICOAGULATION FOLLOW-UP CLINIC VISIT    Patient Name:  Tae Arguelles  Date:  3/12/2019  Contact Type:  Face to Face    SUBJECTIVE:     Patient Findings            OBJECTIVE    INR Protime   Date Value Ref Range Status   2019 2.1 (A) 0.86 - 1.14 Final       ASSESSMENT / PLAN  INR assessment THER    Recheck INR In: 3 WEEKS    INR Location Clinic      Anticoagulation Summary  As of 3/12/2019    INR goal:   2.0-3.0   TTR:   76.1 % (3.4 mo)   INR used for dosin.1 (3/12/2019)   Warfarin maintenance plan:   7.5 mg (5 mg x 1.5) every Mon, Wed, Fri; 5 mg (5 mg x 1) all other days   Full warfarin instructions:   7.5 mg every Mon, Wed, Fri; 5 mg all other days   Weekly warfarin total:   42.5 mg   Plan last modified:   Sophia Nguyen RN (2019)   Next INR check:      Target end date:       Indications    Ischemic cardiomyopathy [I25.5]             Anticoagulation Episode Summary     INR check location:       Preferred lab:       Send INR reminders to:    [facility]ICOAGULATION    Comments:         Anticoagulation Care Providers     Provider Role Specialty Phone number    Janet Alcaraz PA-C Responsible Internal Medicine [phone]            See the Encounter Report to view Anticoagulation Flowsheet and Dosing Calendar (Go to Encounters tab in chart review, and find the Anticoagulation Therapy Visit)    Dosage adjustment made based on physician directed care plan. INR 2.1.  Continue same and recheck 3 weeks.    Sophia Nguyen RN

## 2019-04-09 ENCOUNTER — ANTICOAGULATION THERAPY VISIT (OUTPATIENT)
Dept: NURSING | Facility: CLINIC | Age: 69
End: 2019-04-09
Payer: MEDICARE

## 2019-04-09 DIAGNOSIS — I25.5 ISCHEMIC CARDIOMYOPATHY: ICD-10-CM

## 2019-04-09 LAB — INR POINT OF CARE: 3.1 (ref 0.86–1.14)

## 2019-04-09 PROCEDURE — 99207 ZZC NO CHARGE NURSE ONLY: CPT

## 2019-04-09 PROCEDURE — 85610 PROTHROMBIN TIME: CPT | Mod: QW

## 2019-04-09 PROCEDURE — 36416 COLLJ CAPILLARY BLOOD SPEC: CPT

## 2019-04-09 NOTE — PROGRESS NOTES
ANTICOAGULATION FOLLOW-UP CLINIC VISIT    Patient Name:  Tae Arguelles  Date:  4/9/2019  Contact Type:  Face to Face    SUBJECTIVE:     Patient Findings     Comments:   The patient was assessed for diet, medication, and activity level changes, missed or extra doses, bruising or bleeding, with no problem findings.             OBJECTIVE    INR Protime   Date Value Ref Range Status   04/09/2019 3.1 (A) 0.86 - 1.14 Final       ASSESSMENT / PLAN  INR assessment THER    Recheck INR In: 4 WEEKS    INR Location Clinic      Anticoagulation Summary  As of 4/9/2019    INR goal:   2.0-3.0   TTR:   79.2 % (4.3 mo)   INR used for dosing:   3.1! (4/9/2019)   Warfarin maintenance plan:   7.5 mg (5 mg x 1.5) every Mon, Wed, Fri; 5 mg (5 mg x 1) all other days   Full warfarin instructions:   7.5 mg every Mon, Wed, Fri; 5 mg all other days   Weekly warfarin total:   42.5 mg   No change documented:   Sophia Nguyen RN   Plan last modified:   Sophia Nguyen RN (2/19/2019)   Next INR check:   5/7/2019   Target end date:       Indications    Ischemic cardiomyopathy [I25.5]             Anticoagulation Episode Summary     INR check location:       Preferred lab:       Send INR reminders to:    ANTICOAGULATION    Comments:         Anticoagulation Care Providers     Provider Role Specialty Phone number    Janet Alcaraz PA-C [facility] Internal Medicine [phone]            See the Encounter Report to view Anticoagulation Flowsheet and Dosing Calendar (Go to Encounters tab in chart review, and find the Anticoagulation Therapy Visit)    Dosage adjustment made based on physician directed care plan.INR 3.1.  Continue same and recheck 4 weeks.    Sophia Nguyen RN

## 2019-05-07 ENCOUNTER — ANTICOAGULATION THERAPY VISIT (OUTPATIENT)
Dept: NURSING | Facility: CLINIC | Age: 69
End: 2019-05-07
Payer: MEDICARE

## 2019-05-07 DIAGNOSIS — I25.5 ISCHEMIC CARDIOMYOPATHY: ICD-10-CM

## 2019-05-07 LAB — INR POINT OF CARE: 2.9 (ref 0.86–1.14)

## 2019-05-07 PROCEDURE — 85610 PROTHROMBIN TIME: CPT | Mod: QW

## 2019-05-07 PROCEDURE — 99207 ZZC NO CHARGE NURSE ONLY: CPT

## 2019-05-07 PROCEDURE — 36416 COLLJ CAPILLARY BLOOD SPEC: CPT

## 2019-05-07 RX ORDER — WARFARIN SODIUM 5 MG/1
TABLET ORAL
Qty: 135 TABLET | Refills: 1 | Status: SHIPPED | OUTPATIENT
Start: 2019-05-07 | End: 2020-01-07

## 2019-05-07 NOTE — PROGRESS NOTES
ANTICOAGULATION FOLLOW-UP CLINIC VISIT    Patient Name:  Tae Arguelles  Date:  2019  Contact Type:  Face to Face    SUBJECTIVE:     Patient Findings     Comments:   The patient was assessed for diet, medication, and activity level changes, missed or extra doses, bruising or bleeding, with no problem findings.             OBJECTIVE    INR Protime   Date Value Ref Range Status   2019 2.9 (A) 0.86 - 1.14 Final       ASSESSMENT / PLAN  INR assessment THER    Recheck INR In: 4 WEEKS    INR Location Clinic      Anticoagulation Summary  As of 2019    INR goal:   2.0-3.0   TTR:   74.0 % (5.2 mo)   INR used for dosin.9 (2019)   Warfarin maintenance plan:   7.5 mg (5 mg x 1.5) every Mon, Wed, Fri; 5 mg (5 mg x 1) all other days   Full warfarin instructions:   7.5 mg every Mon, Wed, Fri; 5 mg all other days   Weekly warfarin total:   42.5 mg   No change documented:   Sophia Nguyen RN   Plan last modified:   Sophia Nguyen RN (2019)   Next INR check:   2019   Target end date:       Indications    Ischemic cardiomyopathy [I25.5]             Anticoagulation Episode Summary     INR check location:       Preferred lab:       Send INR reminders to:    ANTICOAGULATION    Comments:         Anticoagulation Care Providers     Provider Role Specialty Phone number    Janet Alcaraz PA-C [facility] Internal Medicine [phone]            See the Encounter Report to view Anticoagulation Flowsheet and Dosing Calendar (Go to Encounters tab in chart review, and find the Anticoagulation Therapy Visit)    Dosage adjustment made based on physician directed care plan. INR 2.9. Continue same and recheck 4 weeks.    Sophia Nguyen RN

## 2019-06-04 ENCOUNTER — ANTICOAGULATION THERAPY VISIT (OUTPATIENT)
Dept: NURSING | Facility: CLINIC | Age: 69
End: 2019-06-04
Payer: MEDICARE

## 2019-06-04 LAB — INR POINT OF CARE: 2.2 (ref 0.86–1.14)

## 2019-06-04 PROCEDURE — 85610 PROTHROMBIN TIME: CPT | Mod: QW

## 2019-06-04 PROCEDURE — 36416 COLLJ CAPILLARY BLOOD SPEC: CPT

## 2019-06-04 PROCEDURE — 99207 ZZC NO CHARGE NURSE ONLY: CPT

## 2019-06-04 NOTE — PROGRESS NOTES
ANTICOAGULATION FOLLOW-UP CLINIC VISIT    Patient Name:  Tae Arguelles  Date:  2019  Contact Type:  Face to Face    SUBJECTIVE:  Patient Findings         Clinical Outcomes     Negatives:   Major bleeding event, Thromboembolic event, Anticoagulation-related hospital admission, Anticoagulation-related ED visit, Anticoagulation-related fatality           OBJECTIVE    INR Protime   Date Value Ref Range Status   2019 2.2 (A) 0.86 - 1.14 Final       ASSESSMENT / PLAN  INR assessment THER    Recheck INR In: 5 WEEKS    INR Location Clinic      Anticoagulation Summary  As of 2019    INR goal:   2.0-3.0   TTR:   77.9 % (6.2 mo)   INR used for dosin.2 (2019)   Warfarin maintenance plan:   7.5 mg (5 mg x 1.5) every Mon, Wed, Fri; 5 mg (5 mg x 1) all other days   Full warfarin instructions:   7.5 mg every Mon, Wed, Fri; 5 mg all other days   Weekly warfarin total:   42.5 mg   No change documented:   Yajaira Bledsoe RN   Plan last modified:   Sophia Nguyen RN (2019)   Next INR check:   2019   Target end date:       Indications    Ischemic cardiomyopathy [I25.5]             Anticoagulation Episode Summary     INR check location:       Preferred lab:       Send INR reminders to:   [facility]    Comments:         Anticoagulation Care Providers     Provider Role Specialty Phone number    Janet Alcaraz PA-C [facility] Internal Medicine [phone]            See the Encounter Report to view Anticoagulation Flowsheet and Dosing Calendar (Go to Encounters tab in chart review, and find the Anticoagulation Therapy Visit)    Pt INR is 2.2 today. Pt advised to continue maintenance dose or 7.5 mg on , ,  and 5 mg all the other days. Pt denies any changes in health,diet, medication or activity. Tae aware if signs of clotting (pain, tenderness, swelling, color change in leg or arm, SOB) and bleeding occur (blood in stool, urine, large bruising, bleeding gums,  nosebleeds) to have INR check sooner. If sx severe report to ER or concerned for stroke call 911. If general questions or concerns arise, call clinic.         Yajaira Bledsoe RN

## 2019-06-12 ENCOUNTER — OFFICE VISIT (OUTPATIENT)
Dept: FAMILY MEDICINE | Facility: CLINIC | Age: 69
End: 2019-06-12
Payer: MEDICARE

## 2019-06-12 VITALS
SYSTOLIC BLOOD PRESSURE: 138 MMHG | OXYGEN SATURATION: 96 % | TEMPERATURE: 97.1 F | HEART RATE: 75 BPM | WEIGHT: 187 LBS | BODY MASS INDEX: 28.34 KG/M2 | HEIGHT: 68 IN | DIASTOLIC BLOOD PRESSURE: 64 MMHG

## 2019-06-12 DIAGNOSIS — H61.23 BILATERAL IMPACTED CERUMEN: Primary | ICD-10-CM

## 2019-06-12 PROCEDURE — 99212 OFFICE O/P EST SF 10 MIN: CPT | Performed by: PHYSICIAN ASSISTANT

## 2019-06-12 ASSESSMENT — MIFFLIN-ST. JEOR: SCORE: 1574.79

## 2019-06-12 NOTE — PROGRESS NOTES
HPI: Osman is a pleasant 67 yo male here for plugged ears  Has hx of wax so wants to be checked  Denies ear pain, drainage or cold sxs.      Past Medical History:   Diagnosis Date     BPH (benign prostatic hyperplasia)     Dr. Whitfield, PSA 0.27 2012     CAD (coronary artery disease)     MI      CHF (congestive heart failure) (H)      Colon polyp      History of smoking     Quit      HTN (hypertension), benign      Hyperlipidemia LDL goal <100      Ischemic cardiomyopathy     Dr. Alva     Lichen planus      Normal colonoscopy 1-3-08    Repeat 5-10 years     Pacemaker ,     ventricular tachycardia     Pleural effusion      Pneumonia      Pneumothorax     plus rib fx secondary to fall (L)     Seborrheic dermatitis      Toe fracture, right      Past Surgical History:   Procedure Laterality Date     C CABG, ARTERIAL, TWO       COLONOSCOPY N/A 2015    Procedure: COLONOSCOPY;  Surgeon: Zac Austin MD;  Location:  GI     MOUTH SURGERY      Upper and lower dentures     Social History     Tobacco Use     Smoking status: Former Smoker     Last attempt to quit: 10/23/1997     Years since quittin.6     Smokeless tobacco: Never Used   Substance Use Topics     Alcohol use: Yes     Alcohol/week: 0.0 oz     Comment: 13 drinks a week     Current Outpatient Medications   Medication Sig Dispense Refill     ASPIRIN NOT PRESCRIBED, INTENTIONAL, 81 each continuous prn. Antiplatelet medication not prescribed intentionally due to Current anticoagulant therapy (warfarin/enoxaparin) 0 each 0     Cholecalciferol (VITAMIN D) 1000 UNITS capsule Take 1 capsule by mouth daily.       fluocinolone (SYNALAR) 0.01 % solution PRN 60 mL 1     FUROSEMIDE 40 MG OR TABS 1 TABLET DAILY       LIPITOR 20 MG OR TABS 1 TABLET DAILY       LISINOPRIL 10 MG OR TABS 1 TABLET DAILY       metoprolol (LOPRESSOR) 50 MG tablet Take 1 tablet by mouth 2 times daily. 180 tablet 3     multivitamin, therapeutic with  "minerals (MULTI-VITAMIN) TABS Take 1 tablet by mouth daily.       NIACIN 750 MG OR TBCR 2 tabs qhs       nitroglycerin (NITROSTAT) 0.4 MG SL tablet Place  under the tongue every 5 minutes as needed.       warfarin (COUMADIN) 5 MG tablet Take 1 to 1/1/2 tablets daily as directed by INR clinic. 135 tablet 1     Allergies   Allergen Reactions     Septra [Sulfa Drugs]      FAMILY HISTORY NOTED AND REVIEWED    PHYSICAL EXAM:    /64 (BP Location: Right arm, Cuff Size: Adult Large)   Pulse 75   Temp 97.1  F (36.2  C) (Oral)   Ht 1.715 m (5' 7.5\")   Wt 84.8 kg (187 lb)   SpO2 96%   BMI 28.86 kg/m      Patient appears non toxic  Ears: bilat cerumen plus removed easily with warm water irrig without complications. TMs pearly grey.    Assessment and Plan:     (H61.23) Bilateral impacted cerumen  (primary encounter diagnosis)  Comment:   Plan: Irrig done without complications       Janet Alcaraz PA-C          "

## 2019-07-09 ENCOUNTER — ANTICOAGULATION THERAPY VISIT (OUTPATIENT)
Dept: NURSING | Facility: CLINIC | Age: 69
End: 2019-07-09
Payer: MEDICARE

## 2019-07-09 LAB — INR POINT OF CARE: 2.8 (ref 0.86–1.14)

## 2019-07-09 PROCEDURE — 85610 PROTHROMBIN TIME: CPT | Mod: QW

## 2019-07-09 PROCEDURE — 36416 COLLJ CAPILLARY BLOOD SPEC: CPT

## 2019-07-09 NOTE — PROGRESS NOTES
ANTICOAGULATION FOLLOW-UP CLINIC VISIT    Patient Name:  Tae Arguelles  Date:  2019  Contact Type:  Face to Face    SUBJECTIVE:  Patient Findings         Clinical Outcomes     Negatives:   Major bleeding event, Thromboembolic event, Anticoagulation-related hospital admission, Anticoagulation-related ED visit, Anticoagulation-related fatality           OBJECTIVE    INR Protime   Date Value Ref Range Status   2019 2.8 (A) 0.86 - 1.14 Final       ASSESSMENT / PLAN  INR assessment THER    Recheck INR In: 6 WEEKS    INR Location Clinic      Anticoagulation Summary  As of 2019    INR goal:   2.0-3.0   TTR:   81.4 % (7.3 mo)   INR used for dosin.8 (2019)   Warfarin maintenance plan:   7.5 mg (5 mg x 1.5) every Mon, Wed, Fri; 5 mg (5 mg x 1) all other days   Full warfarin instructions:   7.5 mg every Mon, Wed, Fri; 5 mg all other days   Weekly warfarin total:   42.5 mg   No change documented:   Yajaira Bledsoe RN   Plan last modified:   Sophia Nguyen RN (2019)   Next INR check:   2019   Target end date:       Indications    Ischemic cardiomyopathy [I25.5]             Anticoagulation Episode Summary     INR check location:       Preferred lab:       Send INR reminders to:   [facility]STRADA    Comments:         Anticoagulation Care Providers     Provider Role Specialty Phone number    Janet Alcaraz PA-C [facility] Internal Medicine [phone]            See the Encounter Report to view Anticoagulation Flowsheet and Dosing Calendar (Go to Encounters tab in chart review, and find the Anticoagulation Therapy Visit)    Pt INR is 2.8 today. Pt advised to continue maintenance dose of 7.5 mg on , ,  and 5 mg all the other days. Recheck in 6 weeks. Pt denies any changes in health, diet, activity or medication. Tae aware if signs of clotting (pain, tenderness, swelling, color change in leg or arm, SOB) and bleeding occur (blood in stool, urine, large  bruising, bleeding gums, nosebleeds) to have INR check sooner. If sx severe report to ER or concerned for stroke call 911. If general questions or concerns arise, call clinic.         Yajaira Bledsoe RN

## 2019-08-20 ENCOUNTER — ANTICOAGULATION THERAPY VISIT (OUTPATIENT)
Dept: NURSING | Facility: CLINIC | Age: 69
End: 2019-08-20
Payer: MEDICARE

## 2019-08-20 DIAGNOSIS — I25.5 ISCHEMIC CARDIOMYOPATHY: ICD-10-CM

## 2019-08-20 LAB — INR POINT OF CARE: 3.4 (ref 0.86–1.14)

## 2019-08-20 PROCEDURE — 85610 PROTHROMBIN TIME: CPT | Mod: QW

## 2019-08-20 PROCEDURE — 36416 COLLJ CAPILLARY BLOOD SPEC: CPT

## 2019-08-20 NOTE — PATIENT INSTRUCTIONS
Anticoagulation Clinic:  Please call [phone] with any problems or questions regarding your Warfarin therapy.    Some signs and symptoms of bleeding include: Nose bleed or cut that does not stop bleeding in 10 minutes, bleeding of the gums, vomiting (will look like coffee grounds) or coughing up blood, unusual, easy or large areas of bruising, increased or unexpected  Vaginal bleeding or increased menstrual flow, red or black stools, red or orange urine, prolonged or severe headache, pale skin, unusual or constant tiredness.  If you have these please call 911 or seek medical care immediately.

## 2019-08-20 NOTE — PROGRESS NOTES
ANTICOAGULATION FOLLOW-UP CLINIC VISIT    Patient Name:  Tae Arguelles  Date:  8/20/2019  Contact Type:  Face to Face    SUBJECTIVE:  Patient Findings     Positives:   Change in health    Comments:   Bleeding Signs/Symptoms: NO  Thromboembolic Signs/Symptoms: NO     Medication Changes:  NO  Dietary Changes:  NO  Bacterial/Viral Infection: 1 week ago had diarrhea.     Missed Coumadin Doses: NO  Other Concerns:  NO          Clinical Outcomes     Negatives:   Major bleeding event, Thromboembolic event, Anticoagulation-related hospital admission, Anticoagulation-related ED visit, Anticoagulation-related fatality    Comments:   Bleeding Signs/Symptoms: NO  Thromboembolic Signs/Symptoms: NO     Medication Changes:  NO  Dietary Changes:  NO  Bacterial/Viral Infection: 1 week ago had diarrhea.     Missed Coumadin Doses: NO  Other Concerns:  NO             OBJECTIVE    INR Protime   Date Value Ref Range Status   08/20/2019 3.4 (A) 0.86 - 1.14 Final       ASSESSMENT / PLAN  INR assessment SUPRA    Recheck INR In: 2 WEEKS    INR Location Clinic      Anticoagulation Summary  As of 8/20/2019    INR goal:   2.0-3.0   TTR:   73.7 % (8.7 mo)   INR used for dosing:   3.4! (8/20/2019)   Warfarin maintenance plan:   7.5 mg (5 mg x 1.5) every Mon, Wed, Fri; 5 mg (5 mg x 1) all other days   Full warfarin instructions:   8/20: 2.5 mg; Otherwise 7.5 mg every Mon, Wed, Fri; 5 mg all other days   Weekly warfarin total:   42.5 mg   Plan last modified:   Sophia Nguyen RN (2/19/2019)   Next INR check:   9/3/2019   Target end date:       Indications    Ischemic cardiomyopathy [I25.5]             Anticoagulation Episode Summary     INR check location:       Preferred lab:       Send INR reminders to:   [facility]O ESTRADA    Comments:         Anticoagulation Care Providers     Provider Role Specialty Phone number    Janet Alcaraz PA-C [facility] Internal Medicine [phone]            See the Encounter Report to view Anticoagulation  Flowsheet and Dosing Calendar (Go to Encounters tab in chart review, and find the Anticoagulation Therapy Visit)        Jaqueline March RN

## 2019-09-03 ENCOUNTER — ANTICOAGULATION THERAPY VISIT (OUTPATIENT)
Dept: NURSING | Facility: CLINIC | Age: 69
End: 2019-09-03
Payer: MEDICARE

## 2019-09-03 LAB — INR POINT OF CARE: 3.1 (ref 0.86–1.14)

## 2019-09-03 PROCEDURE — 99207 ZZC NO CHARGE NURSE ONLY: CPT

## 2019-09-03 PROCEDURE — 36416 COLLJ CAPILLARY BLOOD SPEC: CPT

## 2019-09-03 PROCEDURE — 85610 PROTHROMBIN TIME: CPT | Mod: QW

## 2019-09-03 NOTE — PROGRESS NOTES
ANTICOAGULATION FOLLOW-UP CLINIC VISIT    Patient Name:  Tae Arguelles  Date:  9/3/2019  Contact Type:  Face to Face    SUBJECTIVE:  Patient Findings     Comments:   Patient denies any identifiable changes that caused the increase in INR.        Clinical Outcomes     Comments:   Patient denies any identifiable changes that caused the increase in INR.           OBJECTIVE    INR Protime   Date Value Ref Range Status   09/03/2019 3.1 (A) 0.86 - 1.14 Final       ASSESSMENT / PLAN  INR assessment SUPRA    Recheck INR In: 3 WEEKS    INR Location Clinic      Anticoagulation Summary  As of 9/3/2019    INR goal:   2.0-3.0   TTR:   70.0 % (9.2 mo)   INR used for dosing:   3.1! (9/3/2019)   Warfarin maintenance plan:   7.5 mg (5 mg x 1.5) every Mon, Wed, Fri; 5 mg (5 mg x 1) all other days   Full warfarin instructions:   9/3: 2.5 mg; Otherwise 7.5 mg every Mon, Wed, Fri; 5 mg all other days   Weekly warfarin total:   42.5 mg   Plan last modified:   Sophia Nguyen RN (2/19/2019)   Next INR check:   9/24/2019   Target end date:       Indications    Ischemic cardiomyopathy [I25.5]             Anticoagulation Episode Summary     INR check location:       Preferred lab:       Send INR reminders to:   GINO ESTRADA    Comments:         Anticoagulation Care Providers     Provider Role Specialty Phone number    Janet Alcaraz PA-C [facility] Internal Medicine [phone]            See the Encounter Report to view Anticoagulation Flowsheet and Dosing Calendar (Go to Encounters tab in chart review, and find the Anticoagulation Therapy Visit)    Pt INR is 3.1 today. Pt states that he eats greens on Tuesdays, Saturdays. Pt advised to eat greens on Thursdays as well. Pt does not want to make a change in his maintenance dose for now. Pt advised to take 2.5 mg today 9/3/19 then 7.5 mg on Mondays, Wednesdays, Fridays and 5 mg all the other days. Pt does not want to recheck INR sooner than 3 weeks. Pt advised that if his INR is  supratherapeutic on 9/24/19, may need to make a maintenance dose reduction. Tae aware if signs of clotting (pain, tenderness, swelling, color change in leg or arm, SOB) and bleeding occur (blood in stool, urine, large bruising, bleeding gums, nosebleeds) to have INR check sooner. If sx severe report to ER or concerned for stroke call 911. If general questions or concerns arise, call clinic.         Yajaira Bledsoe RN

## 2019-09-24 ENCOUNTER — ANTICOAGULATION THERAPY VISIT (OUTPATIENT)
Dept: NURSING | Facility: CLINIC | Age: 69
End: 2019-09-24
Payer: MEDICARE

## 2019-09-24 LAB — INR POINT OF CARE: 2.6 (ref 0.86–1.14)

## 2019-09-24 PROCEDURE — 99207 ZZC NO CHARGE NURSE ONLY: CPT

## 2019-09-24 PROCEDURE — 85610 PROTHROMBIN TIME: CPT | Mod: QW

## 2019-09-24 PROCEDURE — 36416 COLLJ CAPILLARY BLOOD SPEC: CPT

## 2019-09-24 NOTE — PROGRESS NOTES
ANTICOAGULATION FOLLOW-UP CLINIC VISIT    Patient Name:  Tae Arguelles  Date:  2019  Contact Type:  Face to Face    SUBJECTIVE:  Patient Findings     Comments:   The patient was assessed for diet, medication, and activity level changes, missed or extra doses, bruising or bleeding, with no problem findings.        Clinical Outcomes     Negatives:   Major bleeding event, Thromboembolic event, Anticoagulation-related hospital admission, Anticoagulation-related ED visit, Anticoagulation-related fatality    Comments:   The patient was assessed for diet, medication, and activity level changes, missed or extra doses, bruising or bleeding, with no problem findings.           OBJECTIVE    INR Protime   Date Value Ref Range Status   2019 2.6 (A) 0.86 - 1.14 Final       ASSESSMENT / PLAN  INR assessment THER    Recheck INR In: 4 WEEKS    INR Location Clinic      Anticoagulation Summary  As of 2019    INR goal:   2.0-3.0   TTR:   70.7 % (9.9 mo)   INR used for dosin.6 (2019)   Warfarin maintenance plan:   7.5 mg (5 mg x 1.5) every Mon, Wed, Fri; 5 mg (5 mg x 1) all other days   Full warfarin instructions:   7.5 mg every Mon, Wed, Fri; 5 mg all other days   Weekly warfarin total:   42.5 mg   No change documented:   Yajaira Bledsoe RN   Plan last modified:   Sophia Nguyen RN (2019)   Next INR check:   10/22/2019   Target end date:       Indications    Ischemic cardiomyopathy [I25.5]             Anticoagulation Episode Summary     INR check location:       Preferred lab:       Send INR reminders to:   GINO ESTRADA    Comments:         Anticoagulation Care Providers     Provider Role Specialty Phone number    Janet Alcaraz PA-C [facility] Internal Medicine [phone]            See the Encounter Report to view Anticoagulation Flowsheet and Dosing Calendar (Go to Encounters tab in chart review, and find the Anticoagulation Therapy Visit)    Pt INR is 2.6 today. Pt reports that he  has been eating greens three times a week which seems to be a good plan. Pt advised to continue taking 7.5 mg on Mondays, Wednesdays, Fridays and 5 mg all the other days. Recheck INR in 4 weeks. Tae aware if signs of clotting (pain, tenderness, swelling, color change in leg or arm, SOB) and bleeding occur (blood in stool, urine, large bruising, bleeding gums, nosebleeds) to have INR check sooner. If sx severe report to ER or concerned for stroke call 911. If general questions or concerns arise, call clinic.         Yajaira Bledsoe RN

## 2019-10-09 DIAGNOSIS — R97.20 ELEVATED PROSTATE SPECIFIC ANTIGEN (PSA): Primary | ICD-10-CM

## 2019-10-22 ENCOUNTER — ANTICOAGULATION THERAPY VISIT (OUTPATIENT)
Dept: NURSING | Facility: CLINIC | Age: 69
End: 2019-10-22
Payer: MEDICARE

## 2019-10-22 LAB — INR POINT OF CARE: 2.1 (ref 0.86–1.14)

## 2019-10-22 PROCEDURE — 85610 PROTHROMBIN TIME: CPT | Mod: QW

## 2019-10-22 PROCEDURE — 99207 ZZC NO CHARGE NURSE ONLY: CPT

## 2019-10-22 PROCEDURE — 36416 COLLJ CAPILLARY BLOOD SPEC: CPT

## 2019-10-22 NOTE — PROGRESS NOTES
ANTICOAGULATION FOLLOW-UP CLINIC VISIT    Patient Name:  Tae Arguelles  Date:  10/22/2019  Contact Type:  Face to Face    SUBJECTIVE:  Patient Findings     Comments:   The patient was assessed for diet, medication, and activity level changes, missed or extra doses, bruising or bleeding, with no problem findings.        Clinical Outcomes     Negatives:   Major bleeding event, Thromboembolic event, Anticoagulation-related hospital admission, Anticoagulation-related ED visit, Anticoagulation-related fatality    Comments:   The patient was assessed for diet, medication, and activity level changes, missed or extra doses, bruising or bleeding, with no problem findings.           OBJECTIVE    INR Protime   Date Value Ref Range Status   10/22/2019 2.1 (A) 0.86 - 1.14 Final       ASSESSMENT / PLAN  INR assessment THER    Recheck INR In: 6 WEEKS    INR Location Clinic      Anticoagulation Summary  As of 10/22/2019    INR goal:   2.0-3.0   TTR:   73.2 % (10.8 mo)   INR used for dosin.1 (10/22/2019)   Warfarin maintenance plan:   7.5 mg (5 mg x 1.5) every Mon, Wed, Fri; 5 mg (5 mg x 1) all other days   Full warfarin instructions:   7.5 mg every Mon, Wed, Fri; 5 mg all other days   Weekly warfarin total:   42.5 mg   No change documented:   Yajaira Bledsoe RN   Plan last modified:   Sophia Nguyen RN (2019)   Next INR check:   2019   Target end date:       Indications    Ischemic cardiomyopathy [I25.5]             Anticoagulation Episode Summary     INR check location:       Preferred lab:       Send INR reminders to:   GINO ESTRADA    Comments:         Anticoagulation Care Providers     Provider Role Specialty Phone number    Janet Alcaraz PA-C [facility] Internal Medicine [phone]            See the Encounter Report to view Anticoagulation Flowsheet and Dosing Calendar (Go to Encounters tab in chart review, and find the Anticoagulation Therapy Visit)    Pt INR is 2.1 today. Pt advised to  continue taking 7.5 mg on Mondays, Wednesdays, Fridays and 5 mg all the other days. Recheck INR in 6 weeks or sooner as needed. Osman aware if signs of clotting (pain, tenderness, swelling, color change in leg or arm, SOB) and bleeding occur (blood in stool, urine, large bruising, bleeding gums, nosebleeds) to have INR check sooner. If sx severe report to ER or concerned for stroke call 911. If general questions or concerns arise, call clinic.         Yajaira Bledsoe RN

## 2019-11-13 ENCOUNTER — OFFICE VISIT (OUTPATIENT)
Dept: FAMILY MEDICINE | Facility: CLINIC | Age: 69
End: 2019-11-13
Payer: MEDICARE

## 2019-11-13 VITALS
DIASTOLIC BLOOD PRESSURE: 72 MMHG | BODY MASS INDEX: 28.04 KG/M2 | HEIGHT: 68 IN | TEMPERATURE: 98 F | HEART RATE: 76 BPM | OXYGEN SATURATION: 98 % | WEIGHT: 185 LBS | SYSTOLIC BLOOD PRESSURE: 134 MMHG

## 2019-11-13 DIAGNOSIS — L30.9 ECZEMA, UNSPECIFIED TYPE: ICD-10-CM

## 2019-11-13 DIAGNOSIS — Z00.00 ENCOUNTER FOR MEDICARE ANNUAL WELLNESS EXAM: Primary | ICD-10-CM

## 2019-11-13 DIAGNOSIS — I25.5 ISCHEMIC CARDIOMYOPATHY: ICD-10-CM

## 2019-11-13 DIAGNOSIS — E78.5 HYPERLIPIDEMIA LDL GOAL <100: ICD-10-CM

## 2019-11-13 DIAGNOSIS — Z12.5 SCREENING FOR PROSTATE CANCER: ICD-10-CM

## 2019-11-13 DIAGNOSIS — Z95.0 PACEMAKER: ICD-10-CM

## 2019-11-13 DIAGNOSIS — L21.9 SEBORRHEIC DERMATITIS: ICD-10-CM

## 2019-11-13 DIAGNOSIS — N40.1 BENIGN PROSTATIC HYPERPLASIA WITH LOWER URINARY TRACT SYMPTOMS, SYMPTOM DETAILS UNSPECIFIED: ICD-10-CM

## 2019-11-13 DIAGNOSIS — I10 HTN (HYPERTENSION), BENIGN: ICD-10-CM

## 2019-11-13 LAB
ERYTHROCYTE [DISTWIDTH] IN BLOOD BY AUTOMATED COUNT: 11.4 % (ref 10–15)
HCT VFR BLD AUTO: 41.3 % (ref 40–53)
HGB BLD-MCNC: 14.4 G/DL (ref 13.3–17.7)
MCH RBC QN AUTO: 36.9 PG (ref 26.5–33)
MCHC RBC AUTO-ENTMCNC: 34.9 G/DL (ref 31.5–36.5)
MCV RBC AUTO: 106 FL (ref 78–100)
PLATELET # BLD AUTO: 147 10E9/L (ref 150–450)
RBC # BLD AUTO: 3.9 10E12/L (ref 4.4–5.9)
WBC # BLD AUTO: 7.1 10E9/L (ref 4–11)

## 2019-11-13 PROCEDURE — 80053 COMPREHEN METABOLIC PANEL: CPT | Performed by: PHYSICIAN ASSISTANT

## 2019-11-13 PROCEDURE — 36415 COLL VENOUS BLD VENIPUNCTURE: CPT | Performed by: PHYSICIAN ASSISTANT

## 2019-11-13 PROCEDURE — 84153 ASSAY OF PSA TOTAL: CPT | Performed by: PHYSICIAN ASSISTANT

## 2019-11-13 PROCEDURE — 80061 LIPID PANEL: CPT | Performed by: PHYSICIAN ASSISTANT

## 2019-11-13 PROCEDURE — 85027 COMPLETE CBC AUTOMATED: CPT | Performed by: PHYSICIAN ASSISTANT

## 2019-11-13 PROCEDURE — G0439 PPPS, SUBSEQ VISIT: HCPCS | Performed by: PHYSICIAN ASSISTANT

## 2019-11-13 RX ORDER — HYDROCORTISONE VALERATE CREAM 2 MG/G
CREAM TOPICAL
Qty: 45 G | Refills: 1 | Status: SHIPPED | OUTPATIENT
Start: 2019-11-13 | End: 2022-05-20

## 2019-11-13 RX ORDER — FLUOCINOLONE ACETONIDE 0.1 MG/ML
SOLUTION TOPICAL
Qty: 60 ML | Refills: 1 | Status: SHIPPED | OUTPATIENT
Start: 2019-11-13

## 2019-11-13 RX ORDER — HYDROCORTISONE VALERATE CREAM 2 MG/G
CREAM TOPICAL
COMMUNITY
Start: 2004-02-17 | End: 2019-11-13

## 2019-11-13 ASSESSMENT — MIFFLIN-ST. JEOR: SCORE: 1560.71

## 2019-11-13 ASSESSMENT — ACTIVITIES OF DAILY LIVING (ADL): CURRENT_FUNCTION: NO ASSISTANCE NEEDED

## 2019-11-13 NOTE — LETTER
[facility]  [address] [facility]  [address]  [place]  [address]  Tel: [phone]    November 14, 2019    Tae ISAAC Kindra  [address] [facility] [address]        Dear Mr. Arguelles,    I am happy to report that your CBC or complete blood count is stable.    Your chemistry panel shows no signs of diabetes.  Your blood salts, kidney tests, liver tests, and proteins are all fine.    Your total cholesterol is 172 with the normal range being below 200.  Your HDL or good cholesterol is 54 with the normal range being above 50.  Your LDL or bad cholesterol is 90 with the normal range being below 100.      Your PSA level is normal.    If you have any further questions or problems, please contact our office.      Sincerely,    Janet Alcaraz PA-C/ Sara Booker CMA  Results for orders placed or performed in visit on 11/13/19   Comprehensive metabolic panel     Status: Abnormal   Result Value Ref Range    Sodium 135 133 - 144 mmol/L    Potassium 4.3 3.4 - 5.3 mmol/L    Chloride 103 94 - 109 mmol/L    Carbon Dioxide 25 20 - 32 mmol/L    Anion Gap 7 3 - 14 mmol/L    Glucose 113 (H) 70 - 99 mg/dL    Urea Nitrogen 17 7 - 30 mg/dL    Creatinine 0.77 0.66 - 1.25 mg/dL    GFR Estimate >90 >60 mL/min/[1.73_m2]    GFR Estimate If Black >90 >60 mL/min/[1.73_m2]    Calcium 9.2 8.5 - 10.1 mg/dL    Bilirubin Total 0.6 0.2 - 1.3 mg/dL    Albumin 4.4 3.4 - 5.0 g/dL    Protein Total 7.9 6.8 - 8.8 g/dL    Alkaline Phosphatase 82 40 - 150 U/L    ALT 35 0 - 70 U/L    AST 22 0 - 45 U/L   CBC with platelets     Status: Abnormal   Result Value Ref Range    WBC 7.1 4.0 - 11.0 10e9/L    RBC Count 3.90 (L) 4.4 - 5.9 10e12/L    Hemoglobin 14.4 13.3 - 17.7 g/dL    Hematocrit 41.3 40.0 - 53.0 %     (H) 78 - 100 fl    MCH 36.9 (H) 26.5 - 33.0 pg    MCHC 34.9 31.5 - 36.5 g/dL    RDW 11.4 10.0 - 15.0 %    Platelet Count 147 (L) 150 - 450 10e9/L   Lipid Profile     Status: None   Result Value Ref Range    Cholesterol 172  <200 mg/dL    Triglycerides 138 <150 mg/dL    HDL Cholesterol 54 >39 mg/dL    LDL Cholesterol Calculated 90 <100 mg/dL    Non HDL Cholesterol 118 <130 mg/dL   PSA, tumor marker     Status: None   Result Value Ref Range    PSA 0.64 0 - 4 ug/L               Enclosure: Lab Results

## 2019-11-13 NOTE — PROGRESS NOTES
"SUBJECTIVE:   Tae Arguelles is a 69 year old male who presents for Preventive Visit.    He is followed by Dr. Alav in cardiology. Has appt next month.  He is exercising vigorously without issues.  Followed by Dr. Whitfield in urology and due for PSA  Are you in the first 12 months of your Medicare coverage?  No    Healthy Habits:     In general, how would you rate your overall health?  Very good    Frequency of exercise:: 3-4 days/weekly.    Duration of exercise:  45-60 minutes    Do you usually eat at least 4 servings of fruit and vegetables a day, include whole grains    & fiber and avoid regularly eating high fat or \"junk\" foods?  Yes    Taking medications regularly:  Yes    Barriers to taking medications:  None    Medication side effects:  None    Ability to successfully perform activities of daily living:  No assistance needed    Home Safety:  No safety concerns identified    Hearing Impairment:  No hearing concerns    In the past 6 months, have you been bothered by leaking of urine?  No    In general, how would you rate your overall mental or emotional health?  Excellent      PHQ-2 Total Score: 0    Additional concerns today:  No    Do you feel safe in your environment? YES    Have you ever done Advance Care Planning? (For example, a Health Directive, POLST, or a discussion with a medical provider or your loved ones about your wishes): No, advance care planning information given to patient to review.  Patient plans to discuss their wishes with loved ones or provider.        Fall risk  Fallen 2 or more times in the past year?: No  Any fall with injury in the past year?: No    Cognitive Screening   1) Repeat 3 items (Leader, Season, Table)    2) Clock draw: NORMAL  3) 3 item recall: Recalls 3 objects  Results: 3 items recalled: COGNITIVE IMPAIRMENT LESS LIKELY    Mini-CogTM Copyright S Lala. Licensed by the author for use in [facility]; reprinted with permission (tere@.[facility]). All rights " reserved.      Do you have sleep apnea, excessive snoring or daytime drowsiness?: no    Reviewed and updated as needed this visit by clinical staff  Tobacco  Allergies         Reviewed and updated as needed this visit by Provider        Social History     Tobacco Use     Smoking status: Former Smoker     Last attempt to quit: 10/23/1997     Years since quittin.0     Smokeless tobacco: Never Used   Substance Use Topics     Alcohol use: Yes     Alcohol/week: 0.0 standard drinks     Comment: 13 drinks a week     If you drink alcohol do you typically have >3 drinks per day or >7 drinks per week? No    Alcohol Use 2019   Prescreen: >3 drinks/day or >7 drinks/week? No         Current providers sharing in care for this patient include:   Patient Care Team:  Janet Alcaraz PA-C as PCP - General (Internal Medicine)  Janet Alcaraz PA-C as Assigned PCP    The following health maintenance items are reviewed in Epic and correct as of today:  Health Maintenance   Topic Date Due     AORTIC ANEURYSM SCREENING (SYSTEM ASSIGNED)  10/16/2013     ZOSTER IMMUNIZATION (2 of 3) 2013     HF ACTION PLAN  2016     LIPID  2016     ADVANCE CARE PLANNING  2018     BMP  2018     ALT  2018     CBC  2019     MEDICARE ANNUAL WELLNESS VISIT  2019     FALL RISK ASSESSMENT  2020     DTAP/TDAP/TD IMMUNIZATION (2 - Td) 2023     COLONOSCOPY  2025     HEPATITIS C SCREENING  Completed     PHQ-2  Completed     INFLUENZA VACCINE  Completed     PNEUMOCOCCAL IMMUNIZATION 65+ LOW/MEDIUM RISK  Completed     IPV IMMUNIZATION  Aged Out     MENINGITIS IMMUNIZATION  Aged Out     Past Medical History:   Diagnosis Date     BPH (benign prostatic hyperplasia)     Dr. Whitfield, PSA 0.27 2012     CAD (coronary artery disease)     MI      CHF (congestive heart failure) (H)      Colon polyp      History of smoking     Quit      HTN (hypertension), benign      Hyperlipidemia LDL  goal <100      Ischemic cardiomyopathy     Dr. Alva     Lichen planus      Normal colonoscopy 1-3-08    Repeat 5-10 years     Pacemaker 1997, 2005    ventricular tachycardia     Pleural effusion      Pneumonia 1998     Pneumothorax     plus rib fx secondary to fall (L)     Seborrheic dermatitis      Toe fracture, right      Past Surgical History:   Procedure Laterality Date     C CABG, ARTERIAL, TWO  1997     COLONOSCOPY N/A 2/12/2015    Procedure: COLONOSCOPY;  Surgeon: Zac Austin MD;  Location:  GI     MOUTH SURGERY  2001    Upper and lower dentures     Current Outpatient Medications   Medication Sig Dispense Refill     ASPIRIN NOT PRESCRIBED, INTENTIONAL, 81 each continuous prn. Antiplatelet medication not prescribed intentionally due to Current anticoagulant therapy (warfarin/enoxaparin) 0 each 0     Cholecalciferol (VITAMIN D) 1000 UNITS capsule Take 1 capsule by mouth daily.       fluocinolone (SYNALAR) 0.01 % solution PRN 60 mL 1     FUROSEMIDE 40 MG OR TABS 1 TABLET DAILY       hydrocortisone (WESTCORT) 0.2 % external cream as needed 45 g 1     LIPITOR 20 MG OR TABS 1 TABLET DAILY       LISINOPRIL 10 MG OR TABS 1 TABLET DAILY       metoprolol (LOPRESSOR) 50 MG tablet Take 1 tablet by mouth 2 times daily. 180 tablet 3     multivitamin, therapeutic with minerals (MULTI-VITAMIN) TABS Take 1 tablet by mouth daily.       NIACIN 750 MG OR TBCR 2 tabs qhs       nitroglycerin (NITROSTAT) 0.4 MG SL tablet Place  under the tongue every 5 minutes as needed.       warfarin (COUMADIN) 5 MG tablet Take 1 to 1/1/2 tablets daily as directed by INR clinic. 135 tablet 1       Review of Systems  Constitutional, HEENT, cardiovascular, pulmonary, GI, , musculoskeletal, neuro, skin, endocrine and psych systems are negative, except as otherwise noted.    OBJECTIVE:   /72 (BP Location: Right arm, Patient Position: Sitting, Cuff Size: Adult Large)   Pulse 76   Temp 98  F (36.7  C) (Oral)   Ht 1.715 m (5'  "7.5\")   Wt 83.9 kg (185 lb)   SpO2 98%   BMI 28.55 kg/m   Estimated body mass index is 28.55 kg/m  as calculated from the following:    Height as of this encounter: 1.715 m (5' 7.5\").    Weight as of this encounter: 83.9 kg (185 lb).  Physical Exam  GENERAL: healthy, alert and no distress  EYES: Eyes grossly normal to inspection, PERRL and conjunctivae and sclerae normal  HENT: ear canals and TM's normal, nose and mouth without ulcers or lesions  NECK: no adenopathy, no asymmetry, masses, or scars and thyroid normal to palpation  RESP: lungs clear to auscultation - no rales, rhonchi or wheezes  CV: regular rate and rhythm, normal S1 S2, no S3 or S4, no murmur, click or rub, no peripheral edema and peripheral pulses strong  ABDOMEN: soft, nontender, no hepatosplenomegaly, no masses and bowel sounds normal  MS: no gross musculoskeletal defects noted, no edema  SKIN: no suspicious lesions or rashes  NEURO: Normal strength and tone, mentation intact and speech normal  PSYCH: mentation appears normal, affect normal/bright        ASSESSMENT / PLAN:   Assessment and Plan:     (Z00.00) Encounter for Medicare annual wellness exam  (primary encounter diagnosis)  Comment: colonoscopy is up to date.  Plan: CBC with platelets        Discussed shingrex    (I25.5) Ischemic cardiomyopathy  Comment: stable  Plan: followed closely by Dr. Alva; notes reviewed    (Z95.0) Pacemaker  Comment:   Plan: pt has defibrillator    (L30.9) Eczema, unspecified type  Comment:   Plan: hydrocortisone (WESTCORT) 0.2 % external cream        refilled    (L21.9) Seborrheic dermatitis  Comment:   Plan: refilled steroid solution for ears when flaky    (E78.5) Hyperlipidemia LDL goal <100  Comment:   Plan: Lipid Profile            (I10) HTN (hypertension), benign  Comment: well controlled, cont same.  Plan: Comprehensive metabolic panel            (N40.1) Benign prostatic hyperplasia with lower urinary tract symptoms, symptom details unspecified " "  Comment: Pt should as Dr. Whitfield next month about whether or not he should have a renal ultrasound or other imaging given +FH of renal cell ca in 2 siblings  Plan: PSA, tumor marker            (Z12.5) Screening for prostate cancer  Comment:   Plan: PSA, tumor marker              COUNSELING:  Reviewed preventive health counseling, as reflected in patient instructions    Estimated body mass index is 28.55 kg/m  as calculated from the following:    Height as of this encounter: 1.715 m (5' 7.5\").    Weight as of this encounter: 83.9 kg (185 lb).         reports that he quit smoking about 22 years ago. He has never used smokeless tobacco.      Appropriate preventive services were discussed with this patient, including applicable screening as appropriate for cardiovascular disease, diabetes, osteopenia/osteoporosis, and glaucoma.  As appropriate for age/gender, discussed screening for colorectal cancer, prostate cancer, breast cancer, and cervical cancer. Checklist reviewing preventive services available has been given to the patient.    Reviewed patients plan of care and provided an AVS. The Basic Care Plan (routine screening as documented in Health Maintenance) for Tae meets the Care Plan requirement. This Care Plan has been established and reviewed with the Patient.    Counseling Resources:  ATP IV Guidelines  Pooled Cohorts Equation Calculator  Breast Cancer Risk Calculator  FRAX Risk Assessment  ICSI Preventive Guidelines  Dietary Guidelines for Americans, 2010  USDA's MyPlate  ASA Prophylaxis  Lung CA Screening    Janet Alcaraz PA-C  [facility]    Identified Health Risks:  "

## 2019-11-13 NOTE — PATIENT INSTRUCTIONS
Patient Education   Personalized Prevention Plan  You are due for the preventive services outlined below.  Your care team is available to assist you in scheduling these services.  If you have already completed any of these items, please share that information with your care team to update in your medical record.  Health Maintenance Due   Topic Date Due     AORTIC ANEURYSM SCREENING (SYSTEM ASSIGNED)  10/16/2013     Zoster (Shingles) Vaccine (2 of 3) 12/23/2013     Heart Failure Action Plan  03/26/2016     Cholesterol Lab  05/27/2016     Discuss Advance Care Planning  03/26/2018     Basic Metabolic Panel  05/07/2018     Liver Monitoring Lab  11/07/2018     Complete Blood Count  11/12/2019     Annual Wellness Visit  11/12/2019         Shingrex?    Renal ultrasound due to FH renal ca?

## 2019-11-14 ENCOUNTER — TELEPHONE (OUTPATIENT)
Dept: FAMILY MEDICINE | Facility: CLINIC | Age: 69
End: 2019-11-14

## 2019-11-14 LAB
ALBUMIN SERPL-MCNC: 4.4 G/DL (ref 3.4–5)
ALP SERPL-CCNC: 82 U/L (ref 40–150)
ALT SERPL W P-5'-P-CCNC: 35 U/L (ref 0–70)
ANION GAP SERPL CALCULATED.3IONS-SCNC: 7 MMOL/L (ref 3–14)
AST SERPL W P-5'-P-CCNC: 22 U/L (ref 0–45)
BILIRUB SERPL-MCNC: 0.6 MG/DL (ref 0.2–1.3)
BUN SERPL-MCNC: 17 MG/DL (ref 7–30)
CALCIUM SERPL-MCNC: 9.2 MG/DL (ref 8.5–10.1)
CHLORIDE SERPL-SCNC: 103 MMOL/L (ref 94–109)
CHOLEST SERPL-MCNC: 172 MG/DL
CO2 SERPL-SCNC: 25 MMOL/L (ref 20–32)
CREAT SERPL-MCNC: 0.77 MG/DL (ref 0.66–1.25)
GFR SERPL CREATININE-BSD FRML MDRD: >90 ML/MIN/{1.73_M2}
GLUCOSE SERPL-MCNC: 113 MG/DL (ref 70–99)
HDLC SERPL-MCNC: 54 MG/DL
LDLC SERPL CALC-MCNC: 90 MG/DL
NONHDLC SERPL-MCNC: 118 MG/DL
POTASSIUM SERPL-SCNC: 4.3 MMOL/L (ref 3.4–5.3)
PROT SERPL-MCNC: 7.9 G/DL (ref 6.8–8.8)
PSA SERPL-MCNC: 0.64 UG/L (ref 0–4)
SODIUM SERPL-SCNC: 135 MMOL/L (ref 133–144)
TRIGL SERPL-MCNC: 138 MG/DL

## 2019-11-14 NOTE — TELEPHONE ENCOUNTER
PCP,    Pharm requesting clarification on Fluocinolone Rx. Please specify directions. (Apply to bilateral ears BID as needed...etc)    Thank you,  Ervin GLOVER RN

## 2019-11-14 NOTE — RESULT ENCOUNTER NOTE
It was a pleasure seeing you for your physical examination.  I wanted to get back to you with your test results.  I have enclosed a copy for your review.      I am happy to report that your CBC or complete blood count is stable.    Your chemistry panel shows no signs of diabetes.  Your blood salts, kidney tests, liver tests, and proteins are all fine.    Your total cholesterol is 172 with the normal range being below 200.  Your HDL or good cholesterol is 54 with the normal range being above 50.  Your LDL or bad cholesterol is 90 with the normal range being below 100.      Your PSA level is normal.    Please let me know if you have any questions.    Janet Alcaraz PA-C

## 2019-11-14 NOTE — TELEPHONE ENCOUNTER
Pharmacy requesting clarification on directions for Fluocinolone Solution     Disp Refills Start End OPAL   fluocinolone (SYNALAR) 0.01 % solution 60 mL 1 11/13/2019  No   Sig: PRN

## 2019-11-15 ENCOUNTER — TELEPHONE (OUTPATIENT)
Dept: FAMILY MEDICINE | Facility: CLINIC | Age: 69
End: 2019-11-15

## 2019-11-15 NOTE — TELEPHONE ENCOUNTER
Prior Authorization Retail Medication Request    Medication/Dose: Hydrocortisone 0.2% cream  ICD code (if different than what is on RX):  L30.9  Previously Tried and Failed:  None  Rationale:  Patient with eczema requesting medication to help with symptoms.    Insurance Name:  [employer]  Insurance ID:  [id number]      Pharmacy Information (if different than what is on RX)  Name:  [id number]  Phone:  [phone]

## 2019-11-18 NOTE — TELEPHONE ENCOUNTER
Central Prior Authorization Team   Phone: [phone]    PA Initiation    Medication: Hydrocortisone 0.2% cream  Insurance Company: [employer] - Phone [phone] Fax [phone]  Pharmacy Filling the Rx: [id number] [place], [place] - [address] AT [facility] [address]  Filling Pharmacy Phone: [phone]  Filling Pharmacy Fax: [phone]  Start Date: 11/18/2019

## 2019-11-18 NOTE — TELEPHONE ENCOUNTER
Central Prior Authorization Team   Phone: [phone]    Prior Authorization Approval    Authorization Effective Date: 8/20/2019  Authorization Expiration Date: 11/17/2020  Medication: Hydrocortisone 0.2% cream  Approved Dose/Quantity:   Reference #: [id number]   Insurance Company: Blake Dobb[employer] - Phone [phone] Fax [phone]  Expected CoPay:       CoPay Card Available:      Foundation Assistance Needed:    Which Pharmacy is filling the prescription (Not needed for infusion/clinic administered): [id number] - [place], [place] - [address] AT [facility] [address]  Pharmacy Notified: Yes  Patient Notified: Yes, **Instructed pharmacy to notify patient when script is ready to /ship.**

## 2019-12-03 ENCOUNTER — TRANSFERRED RECORDS (OUTPATIENT)
Dept: HEALTH INFORMATION MANAGEMENT | Facility: CLINIC | Age: 69
End: 2019-12-03

## 2019-12-04 ENCOUNTER — TELEPHONE (OUTPATIENT)
Dept: FAMILY MEDICINE | Facility: CLINIC | Age: 69
End: 2019-12-04

## 2019-12-04 ENCOUNTER — ANTICOAGULATION THERAPY VISIT (OUTPATIENT)
Dept: NURSING | Facility: CLINIC | Age: 69
End: 2019-12-04
Payer: MEDICARE

## 2019-12-04 LAB — INR POINT OF CARE: 2.9 (ref 0.86–1.14)

## 2019-12-04 PROCEDURE — 36416 COLLJ CAPILLARY BLOOD SPEC: CPT

## 2019-12-04 PROCEDURE — 99207 ZZC NO CHARGE NURSE ONLY: CPT

## 2019-12-04 PROCEDURE — 85610 PROTHROMBIN TIME: CPT | Mod: QW

## 2019-12-04 NOTE — PROGRESS NOTES
ANTICOAGULATION FOLLOW-UP CLINIC VISIT    Patient Name:  Tae Arguelles  Date:  2019  Contact Type:  Face to Face    SUBJECTIVE:  Patient Findings     Comments:   The patient was assessed for diet, medication, and activity level changes, missed or extra doses, bruising or bleeding, with no problem findings.        Clinical Outcomes     Negatives:   Major bleeding event, Thromboembolic event, Anticoagulation-related hospital admission, Anticoagulation-related ED visit, Anticoagulation-related fatality    Comments:   The patient was assessed for diet, medication, and activity level changes, missed or extra doses, bruising or bleeding, with no problem findings.           OBJECTIVE    INR Protime   Date Value Ref Range Status   2019 2.9 (A) 0.86 - 1.14 Final       ASSESSMENT / PLAN  INR assessment THER    Recheck INR In: 6 WEEKS    INR Location Clinic      Anticoagulation Summary  As of 2019    INR goal:   2.0-3.0   TTR:   76.1 % (1 y)   INR used for dosin.9 (2019)   Warfarin maintenance plan:   7.5 mg (5 mg x 1.5) every Mon, Wed, Fri; 5 mg (5 mg x 1) all other days   Full warfarin instructions:   7.5 mg every Mon, Wed, Fri; 5 mg all other days   Weekly warfarin total:   42.5 mg   No change documented:   Yajaira Bledsoe RN   Plan last modified:   Sophia Nguyen RN (2019)   Next INR check:   2020   Target end date:       Indications    Ischemic cardiomyopathy [I25.5]             Anticoagulation Episode Summary     INR check location:       Preferred lab:       Send INR reminders to:   GINO ESTRADA    Comments:         Anticoagulation Care Providers     Provider Role Specialty Phone number    Janet Alcaraz PA-C [facility] Internal Medicine [phone]            See the Encounter Report to view Anticoagulation Flowsheet and Dosing Calendar (Go to Encounters tab in chart review, and find the Anticoagulation Therapy Visit)    Pt INR is 2.9 today. Pt advised to continue  taking 7.5 mg on Mondays, Wednesdays,Fridays and 5 mg all the other days. Recheck INR in 6 weeks or sooner as needed. Pt reports that two siblings diagnosed with kidney cancer and one cousin all around the same time. Pt is going to have a kidney U/S soon. Osman aware if signs of clotting (pain, tenderness, swelling, color change in leg or arm, SOB) and bleeding occur (blood in stool, urine, large bruising, bleeding gums, nosebleeds) to have INR check sooner. If sx severe report to ER or concerned for stroke call 911. If general questions or concerns arise, call clinic.         Yajaira Bledsoe RN

## 2019-12-04 NOTE — TELEPHONE ENCOUNTER
Kaz Gonzales is due for INR renewal/referral. The system is not currently working. Can you please see below and approve if appropriate. Is the current goal range 2.0-3.0?    Thank you!    ANTICOAGULATION MANAGEMENT    Tae Arguelles due for review and annual renewal of referral to anticoagulation monitoring.      Warfarin indication(s) cardiomyopathy   INR goal 2.0-3.0   Current duration of therapy Indefinite/long term therapy   Date of last office visit 11/13/19       Please advise if Tae Arguelles's anticoagulation management referral can be renewed for next year.     Please confirm renewal approval in new note within this telephone encounter and route back. No further action is necessary at this time (referral orders,etc).     Yajaira Bledsoe RN

## 2020-01-06 DIAGNOSIS — I25.5 ISCHEMIC CARDIOMYOPATHY: ICD-10-CM

## 2020-01-07 RX ORDER — WARFARIN SODIUM 5 MG/1
TABLET ORAL
Qty: 135 TABLET | Refills: 0 | Status: SHIPPED | OUTPATIENT
Start: 2020-01-07 | End: 2020-05-01

## 2020-01-07 NOTE — TELEPHONE ENCOUNTER
"warfarin (COUMADIN) 5 MG tablet 135 tablet 1 5/7/2019     Last Written Prescription Date:  5/7/19  Last Fill Quantity: 135,  # refills: 1   Last office visit: 12/4/2019 with prescribing provider:  Lissa   Future Office Visit:  None    Requested Prescriptions   Pending Prescriptions Disp Refills     warfarin ANTICOAGULANT (COUMADIN) 5 MG tablet [Pharmacy Med Name: WARFARIN SOD 5MG TABLETS (PEACH)] 135 tablet 1     Sig: TAKE 1 TO 1 1/2 TABLETS BY MOUTH DAILY AS DIRECTED PER INR CLINIC       Vitamin K Antagonists Failed - 1/6/2020  9:21 PM        Failed - INR is within goal in the past 6 weeks     Confirm INR is within goal in the past 6 weeks.     Recent Labs   Lab Test 12/04/19   INR 2.9*                       Failed - Medication is active on med list        Passed - Recent (12 mo) or future (30 days) visit within the authorizing provider's specialty     Patient has had an office visit with the authorizing provider or a provider within the authorizing providers department within the previous 12 mos or has a future within next 30 days. See \"Patient Info\" tab in inbasket, or \"Choose Columns\" in Meds & Orders section of the refill encounter.              Passed - Patient is 18 years of age or older        No flowsheet data found.      "

## 2020-01-14 ENCOUNTER — TELEPHONE (OUTPATIENT)
Dept: FAMILY MEDICINE | Facility: CLINIC | Age: 70
End: 2020-01-14

## 2020-01-14 ENCOUNTER — ANTICOAGULATION THERAPY VISIT (OUTPATIENT)
Dept: NURSING | Facility: CLINIC | Age: 70
End: 2020-01-14
Payer: MEDICARE

## 2020-01-14 DIAGNOSIS — I25.5 ISCHEMIC CARDIOMYOPATHY: Primary | ICD-10-CM

## 2020-01-14 LAB — INR POINT OF CARE: 2.4 (ref 0.86–1.14)

## 2020-01-14 PROCEDURE — 99207 ZZC NO CHARGE NURSE ONLY: CPT

## 2020-01-14 PROCEDURE — 85610 PROTHROMBIN TIME: CPT | Mod: QW

## 2020-01-14 PROCEDURE — 36416 COLLJ CAPILLARY BLOOD SPEC: CPT

## 2020-01-14 NOTE — PROGRESS NOTES
ANTICOAGULATION FOLLOW-UP CLINIC VISIT    Patient Name:  Tae Arguelles  Date:  2020  Contact Type:  Face to Face    SUBJECTIVE:  Patient Findings     Comments:   The patient was assessed for diet, medication, and activity level changes, missed or extra doses, bruising or bleeding, with no problem findings.        Clinical Outcomes     Negatives:   Major bleeding event, Thromboembolic event, Anticoagulation-related hospital admission, Anticoagulation-related ED visit, Anticoagulation-related fatality    Comments:   The patient was assessed for diet, medication, and activity level changes, missed or extra doses, bruising or bleeding, with no problem findings.           OBJECTIVE    INR Protime   Date Value Ref Range Status   2020 2.4 (A) 0.86 - 1.14 Final       ASSESSMENT / PLAN  INR assessment THER    Recheck INR In: 6 WEEKS    INR Location Clinic      Anticoagulation Summary  As of 2020    INR goal:   2.0-3.0   TTR:   79.3 % (1 y)   INR used for dosin.4 (2020)   Warfarin maintenance plan:   7.5 mg (5 mg x 1.5) every Mon, Wed, Fri; 5 mg (5 mg x 1) all other days   Full warfarin instructions:   7.5 mg every Mon, Wed, Fri; 5 mg all other days   Weekly warfarin total:   42.5 mg   No change documented:   Yajaira Bledsoe RN   Plan last modified:   Sophia Nguyen RN (2019)   Next INR check:   2020   Target end date:       Indications    Ischemic cardiomyopathy [I25.5]             Anticoagulation Episode Summary     INR check location:       Preferred lab:       Send INR reminders to:   GINO ESTRADA    Comments:         Anticoagulation Care Providers     Provider Role Specialty Phone number    Janet Alcaraz PA-C [facility] [phone]            See the Encounter Report to view Anticoagulation Flowsheet and Dosing Calendar (Go to Encounters tab in chart review, and find the Anticoagulation Therapy Visit)    Pt INR is 2.4 today. Pt advised to continue  taking 7.5 mg on Mondays, Wednesdays, Fridays and 5 mg all the other days. Recheck INR in 6 weeks or sooner as needed. Pt reports that his kidney U/S went well. He plans to follow up again in 6 months with that. Tae aware if signs of clotting (pain, tenderness, swelling, color change in leg or arm, SOB) and bleeding occur (blood in stool, urine, large bruising, bleeding gums, nosebleeds) to have INR check sooner. If sx severe report to ER or concerned for stroke call 911. If general questions or concerns arise, call clinic.         Yajaira Bledsoe RN

## 2020-01-14 NOTE — TELEPHONE ENCOUNTER
Clifford Gonzales,    Pt is due for INR renewal/referral. Please review the pended order and sign if appropriate.    Thank you!

## 2020-02-25 ENCOUNTER — ANTICOAGULATION THERAPY VISIT (OUTPATIENT)
Dept: NURSING | Facility: CLINIC | Age: 70
End: 2020-02-25
Payer: MEDICARE

## 2020-02-25 DIAGNOSIS — I25.5 ISCHEMIC CARDIOMYOPATHY: Primary | ICD-10-CM

## 2020-02-25 LAB — INR POINT OF CARE: 2.9 (ref 0.86–1.14)

## 2020-02-25 PROCEDURE — 99207 ZZC NO CHARGE NURSE ONLY: CPT

## 2020-02-25 PROCEDURE — 85610 PROTHROMBIN TIME: CPT | Mod: QW

## 2020-02-25 PROCEDURE — 36416 COLLJ CAPILLARY BLOOD SPEC: CPT

## 2020-02-25 NOTE — PROGRESS NOTES
ANTICOAGULATION FOLLOW-UP CLINIC VISIT    Patient Name:  Tae Arguelles  Date:  2020  Contact Type:  Face to Face    SUBJECTIVE:  Patient Findings     Comments:   The patient was assessed for diet, medication, and activity level changes, missed or extra doses, bruising or bleeding, with no problem findings.        Clinical Outcomes     Negatives:   Major bleeding event, Thromboembolic event, Anticoagulation-related hospital admission, Anticoagulation-related ED visit, Anticoagulation-related fatality    Comments:   The patient was assessed for diet, medication, and activity level changes, missed or extra doses, bruising or bleeding, with no problem findings.           OBJECTIVE    INR Protime   Date Value Ref Range Status   2020 2.9 (A) 0.86 - 1.14 Final       ASSESSMENT / PLAN  INR assessment THER    Recheck INR In: 6 WEEKS    INR Location Clinic      Anticoagulation Summary  As of 2020    INR goal:   2.0-3.0   TTR:   82.7 % (1 y)   INR used for dosin.9 (2020)   Warfarin maintenance plan:   7.5 mg (5 mg x 1.5) every Mon, Wed, Fri; 5 mg (5 mg x 1) all other days   Full warfarin instructions:   7.5 mg every Mon, Wed, Fri; 5 mg all other days   Weekly warfarin total:   42.5 mg   No change documented:   Yajaira Bledsoe RN   Plan last modified:   Sophia Nguyen RN (2019)   Next INR check:   2020   Target end date:   Indefinite    Indications    Ischemic cardiomyopathy [I25.5]             Anticoagulation Episode Summary     INR check location:       Preferred lab:       Send INR reminders to:   GINO ESTRADA    Comments:         Anticoagulation Care Providers     Provider Role Specialty Phone number    Janet Alcaraz PA-C Referring Internal Medicine [phone]            See the Encounter Report to view Anticoagulation Flowsheet and Dosing Calendar (Go to Encounters tab in chart review, and find the Anticoagulation Therapy Visit)    Pt INR is 2.9 today. Pt advised to  continue taking 7.5 mg on Mondays, Wednesdays, Fridays and 5 mg all the other days. Recheck INR in 6 weeks or sooner as needed. Osman aware if signs of clotting (pain, tenderness, swelling, color change in leg or arm, SOB) and bleeding occur (blood in stool, urine, large bruising, bleeding gums, nosebleeds) to have INR check sooner. If sx severe report to ER or concerned for stroke call 911. If general questions or concerns arise, call clinic.         Yajaira Bledsoe RN

## 2020-04-07 ENCOUNTER — DOCUMENTATION ONLY (OUTPATIENT)
Dept: LAB | Facility: CLINIC | Age: 70
End: 2020-04-07

## 2020-04-07 ENCOUNTER — ANTICOAGULATION THERAPY VISIT (OUTPATIENT)
Dept: NURSING | Facility: CLINIC | Age: 70
End: 2020-04-07
Payer: MEDICARE

## 2020-04-07 DIAGNOSIS — I25.5 ISCHEMIC CARDIOMYOPATHY: ICD-10-CM

## 2020-04-07 DIAGNOSIS — I25.5 ISCHEMIC CARDIOMYOPATHY: Primary | ICD-10-CM

## 2020-04-07 LAB — INR PPP: 3 (ref 0.86–1.14)

## 2020-04-07 PROCEDURE — 85610 PROTHROMBIN TIME: CPT | Performed by: PHYSICIAN ASSISTANT

## 2020-04-07 PROCEDURE — 36416 COLLJ CAPILLARY BLOOD SPEC: CPT | Performed by: PHYSICIAN ASSISTANT

## 2020-04-07 PROCEDURE — 99207 ZZC NO CHARGE NURSE ONLY: CPT | Performed by: INTERNAL MEDICINE

## 2020-04-07 NOTE — PROGRESS NOTES
Patient came into lab for INR.  Patient had INR done, but no orders are in.   Please place orders for lab to process results.  Thank you lab,

## 2020-04-07 NOTE — PROGRESS NOTES
ANTICOAGULATION FOLLOW-UP CLINIC VISIT    Patient Name:  Tae Arguelles  Date:  4/7/2020  Contact Type:  Telephone/ Patient    SUBJECTIVE:  Patient Findings     Comments:   The patient was assessed for diet, medication, and activity level changes, missed or extra doses, bruising or bleeding, with no problem findings.        Clinical Outcomes     Negatives:   Major bleeding event, Thromboembolic event, Anticoagulation-related hospital admission, Anticoagulation-related ED visit, Anticoagulation-related fatality    Comments:   The patient was assessed for diet, medication, and activity level changes, missed or extra doses, bruising or bleeding, with no problem findings.           OBJECTIVE    INR   Date Value Ref Range Status   04/07/2020 3.00 (H) 0.86 - 1.14 Final       ASSESSMENT / PLAN  INR assessment THER    Recheck INR In: 6 WEEKS    INR Location Outside lab      Anticoagulation Summary  As of 4/7/2020    INR goal:   2.0-3.0   TTR:   83.3 % (1 y)   INR used for dosing:      Warfarin maintenance plan:   7.5 mg (5 mg x 1.5) every Mon, Wed, Fri; 5 mg (5 mg x 1) all other days   Full warfarin instructions:   7.5 mg every Mon, Wed, Fri; 5 mg all other days   Weekly warfarin total:   42.5 mg   No change documented:   Yajaira Bledsoe RN   Plan last modified:   Sophia Nguyen RN (2/19/2019)   Next INR check:   5/19/2020   Target end date:   Indefinite    Indications    Ischemic cardiomyopathy [I25.5]             Anticoagulation Episode Summary     INR check location:       Preferred lab:       Send INR reminders to:   GINO ESTRADA    Comments:         Anticoagulation Care Providers     Provider Role Specialty Phone number    Janet Alcaraz PA-C Referring Internal Medicine [phone]            See the Encounter Report to view Anticoagulation Flowsheet and Dosing Calendar (Go to Encounters tab in chart review, and find the Anticoagulation Therapy Visit)    Pt INR is 3.0 today. Called and spoke to pt. Pt  advised to continue taking 7.5 mg Mondays, Wednesdays, Fridays and 5 mg all the other days. Recheck INR through lab in 6 weeks on 5/19/20 at 10:15 am. At first, pt and his wife were interested in home INR monitoring until some of the requirements were discussed. Then they changed their mind. The telephone number of [employer] given to pt's wife. They were handing the phone back and forth to each other. Osman aware if signs of clotting (pain, tenderness, swelling, color change in leg or arm, SOB) and bleeding occur (blood in stool, urine, large bruising, bleeding gums, nosebleeds) to have INR check sooner. If sx severe report to ER or concerned for stroke call 911. If general questions or concerns arise, call clinic.         Yajaira Bledsoe RN

## 2020-04-30 DIAGNOSIS — I25.5 ISCHEMIC CARDIOMYOPATHY: ICD-10-CM

## 2020-05-01 RX ORDER — WARFARIN SODIUM 5 MG/1
TABLET ORAL
Qty: 135 TABLET | Refills: 1 | Status: SHIPPED | OUTPATIENT
Start: 2020-05-01 | End: 2020-12-07

## 2020-05-01 NOTE — TELEPHONE ENCOUNTER
Pt's wife, Nadira, is calling in to check on the status of this refill.  He is just about out of medication.  When they are doing INR, they are decreasing the warfarin to a two week supply, where Janet Alcaraz gave a longer rx.      [id number] - [place], [place] - [address] AT [facility] [address]

## 2020-05-19 ENCOUNTER — ANTICOAGULATION THERAPY VISIT (OUTPATIENT)
Dept: NURSING | Facility: CLINIC | Age: 70
End: 2020-05-19

## 2020-05-19 DIAGNOSIS — I25.5 ISCHEMIC CARDIOMYOPATHY: ICD-10-CM

## 2020-05-19 LAB
CAPILLARY BLOOD COLLECTION: NORMAL
INR PPP: 3.1 (ref 0.86–1.14)

## 2020-05-19 PROCEDURE — 36416 COLLJ CAPILLARY BLOOD SPEC: CPT | Performed by: INTERNAL MEDICINE

## 2020-05-19 PROCEDURE — 99207 ZZC NO CHARGE NURSE ONLY: CPT | Performed by: INTERNAL MEDICINE

## 2020-05-19 PROCEDURE — 85610 PROTHROMBIN TIME: CPT | Performed by: INTERNAL MEDICINE

## 2020-05-19 NOTE — PROGRESS NOTES
ANTICOAGULATION FOLLOW-UP CLINIC VISIT    Patient Name:  Tae Arguelles  Date:  5/19/2020  Contact Type:  Telephone/ Patient    SUBJECTIVE:  Patient Findings     Positives:   Other complaints    Comments:   Pt reports that he is stressed by the way things are in the world right now with the current pandemic.        Clinical Outcomes     Comments:   Pt reports that he is stressed by the way things are in the world right now with the current pandemic.           OBJECTIVE    Recent labs: (last 7 days)     05/19/20  1012   INR 3.10*       ASSESSMENT / PLAN  INR assessment SUPRA    Recheck INR In: 4 WEEKS    INR Location Outside lab      Anticoagulation Summary  As of 5/19/2020    INR goal:   2.0-3.0   TTR:   75.9 % (1 y)   INR used for dosing:   3.10! (5/19/2020)   Warfarin maintenance plan:   7.5 mg (5 mg x 1.5) every Mon, Wed, Fri; 5 mg (5 mg x 1) all other days   Full warfarin instructions:   5/20: 5 mg; Otherwise 7.5 mg every Mon, Wed, Fri; 5 mg all other days   Weekly warfarin total:   42.5 mg   Plan last modified:   Sophia Nguyen RN (2/19/2019)   Next INR check:   6/16/2020   Target end date:   Indefinite    Indications    Ischemic cardiomyopathy [I25.5]             Anticoagulation Episode Summary     INR check location:       Preferred lab:       Send INR reminders to:   GINO ESTRADA    Comments:         Anticoagulation Care Providers     Provider Role Specialty Phone number    Janet Alcaraz PA-C Referring Internal Medicine [phone]            See the Encounter Report to view Anticoagulation Flowsheet and Dosing Calendar (Go to Encounters tab in chart review, and find the Anticoagulation Therapy Visit)    Pt INR is 3.1 today. Pt advised to take his usual 5 mg today 5/19/20 then take 5 mg tomorrow 5/20/20 then continue taking 7.5 mg on Mondays, Wednesdays, Fridays and 5 mg all the other days. Recheck INR in 4 weeks on 6/16/20 at 10:30 am.Pt has the central INR number to call if questions or  concerns arise.    Yajaira Bledsoe RN

## 2020-06-03 ENCOUNTER — TRANSFERRED RECORDS (OUTPATIENT)
Dept: HEALTH INFORMATION MANAGEMENT | Facility: CLINIC | Age: 70
End: 2020-06-03

## 2020-06-12 ENCOUNTER — TRANSFERRED RECORDS (OUTPATIENT)
Dept: HEALTH INFORMATION MANAGEMENT | Facility: CLINIC | Age: 70
End: 2020-06-12

## 2020-06-16 ENCOUNTER — ANTICOAGULATION THERAPY VISIT (OUTPATIENT)
Dept: NURSING | Facility: CLINIC | Age: 70
End: 2020-06-16
Payer: MEDICARE

## 2020-06-16 DIAGNOSIS — I25.5 ISCHEMIC CARDIOMYOPATHY: ICD-10-CM

## 2020-06-16 LAB
CAPILLARY BLOOD COLLECTION: NORMAL
INR PPP: 2.7 (ref 0.86–1.14)

## 2020-06-16 PROCEDURE — 36416 COLLJ CAPILLARY BLOOD SPEC: CPT | Performed by: INTERNAL MEDICINE

## 2020-06-16 PROCEDURE — 99207 ZZC NO CHARGE NURSE ONLY: CPT

## 2020-06-16 PROCEDURE — 85610 PROTHROMBIN TIME: CPT | Performed by: INTERNAL MEDICINE

## 2020-06-16 NOTE — PROGRESS NOTES
ANTICOAGULATION FOLLOW-UP CLINIC VISIT    Patient Name:  Tae Arguelles  Date:  2020  Contact Type:  Telephone    SUBJECTIVE:  Patient Findings     Comments:   Signs/symptoms of thrombosis - .nop  The patient was assessed for diet, medication, and activity level changes, missed or extra doses, bruising or bleeding, with no problem findings.          Clinical Outcomes     Comments:   The patient was assessed for diet, medication, and activity level changes, missed or extra doses, bruising or bleeding, with no problem findings.             OBJECTIVE    Recent labs: (last 7 days)     20  1028   INR 2.70*       ASSESSMENT / PLAN  INR assessment THER    Recheck INR In: 6 WEEKS    INR Location Clinic      Anticoagulation Summary  As of 2020    INR goal:   2.0-3.0   TTR:   74.0 % (1 y)   INR used for dosin.70 (2020)   Warfarin maintenance plan:   7.5 mg (5 mg x 1.5) every Mon, Wed, Fri; 5 mg (5 mg x 1) all other days   Full warfarin instructions:   7.5 mg every Mon, Wed, Fri; 5 mg all other days   Weekly warfarin total:   42.5 mg   Plan last modified:   Sophia Nguyen RN (2019)   Next INR check:   2020   Target end date:   Indefinite    Indications    Ischemic cardiomyopathy [I25.5]             Anticoagulation Episode Summary     INR check location:       Preferred lab:       Send INR reminders to:   GINO ESTRADA    Comments:         Anticoagulation Care Providers     Provider Role Specialty Phone number    Janet Alcaraz PA-C Referring Internal Medicine [phone]            See the Encounter Report to view Anticoagulation Flowsheet and Dosing Calendar (Go to Encounters tab in chart review, and find the Anticoagulation Therapy Visit)        Rylie Melendez RN

## 2020-07-28 ENCOUNTER — ANTICOAGULATION THERAPY VISIT (OUTPATIENT)
Dept: NURSING | Facility: CLINIC | Age: 70
End: 2020-07-28

## 2020-07-28 DIAGNOSIS — I25.5 ISCHEMIC CARDIOMYOPATHY: ICD-10-CM

## 2020-07-28 LAB
CAPILLARY BLOOD COLLECTION: NORMAL
INR PPP: 2.4 (ref 0.86–1.14)

## 2020-07-28 PROCEDURE — 36416 COLLJ CAPILLARY BLOOD SPEC: CPT | Performed by: INTERNAL MEDICINE

## 2020-07-28 PROCEDURE — 85610 PROTHROMBIN TIME: CPT | Performed by: INTERNAL MEDICINE

## 2020-07-28 NOTE — PROGRESS NOTES
ANTICOAGULATION MANAGEMENT     Patient Name:  Tae Arguelles  Date:  7/28/2020    ASSESSMENT /SUBJECTIVE:    Today's INR result of 2.4 is therapeutic. Goal INR of 2.0-3.0      Warfarin dose taken: Warfarin taken as previously instructed    Diet: No new diet changes affecting INR    Medication changes/ interactions: No new medications/supplements affecting INR    Previous INR: Therapeutic     S/S of bleeding or thromboembolism: No    New injury or illness: No    Upcoming surgery, procedure or cardioversion: No    Additional findings: None      PLAN:    Spoke with Tae regarding INR result and instructed:     Warfarin Dosing Instructions: Continue your current warfarin dose 7.5mg MWF and 5 mg AOD    Instructed patient to follow up no later than: 6 weeks  Lab visit scheduled    Education provided: Monitoring for bleeding signs and symptoms and Monitoring for clotting signs and symptoms      Tae verbalizes understanding and agrees to warfarin dosing plan.    Instructed to call the Anticoagulation Clinic for any changes, questions or concerns. (#[phone])        Stefany Elena RN      OBJECTIVE:  Recent labs: (last 7 days)     07/28/20  1023   INR 2.40*         INR assessment THER    Recheck INR In: 6 WEEKS    INR Location Clinic      Anticoagulation Summary  As of 7/28/2020    INR goal:   2.0-3.0   TTR:   75.6 % (1 y)   INR used for dosing:   No new INR was available at the time of this encounter.   Warfarin maintenance plan:   7.5 mg (5 mg x 1.5) every Mon, Wed, Fri; 5 mg (5 mg x 1) all other days   Full warfarin instructions:   7.5 mg every Mon, Wed, Fri; 5 mg all other days   Weekly warfarin total:   42.5 mg   No change documented:   Stefany Elena RN   Plan last modified:   Sophia Nguyen RN (2/19/2019)   Next INR check:   9/8/2020   Priority:   Maintenance   Target end date:   Indefinite    Indications    Ischemic cardiomyopathy [I25.5]             Anticoagulation Episode Summary     INR  check location:       Preferred lab:       Send INR reminders to:   GINO ESTRADA    Comments:         Anticoagulation Care Providers     Provider Role Specialty Phone number    Janet Alcaraz PA-C Referring Internal Medicine [phone]

## 2020-09-08 ENCOUNTER — ANTICOAGULATION THERAPY VISIT (OUTPATIENT)
Dept: ANTICOAGULATION | Facility: CLINIC | Age: 70
End: 2020-09-08
Payer: MEDICARE

## 2020-09-08 DIAGNOSIS — I25.5 ISCHEMIC CARDIOMYOPATHY: ICD-10-CM

## 2020-09-08 LAB
CAPILLARY BLOOD COLLECTION: NORMAL
INR PPP: 2.3 (ref 0.86–1.14)

## 2020-09-08 PROCEDURE — 85610 PROTHROMBIN TIME: CPT | Performed by: INTERNAL MEDICINE

## 2020-09-08 PROCEDURE — 99207 ZZC NO CHARGE NURSE ONLY: CPT

## 2020-09-08 PROCEDURE — 36416 COLLJ CAPILLARY BLOOD SPEC: CPT | Performed by: INTERNAL MEDICINE

## 2020-09-08 NOTE — PROGRESS NOTES
ANTICOAGULATION FOLLOW-UP CLINIC VISIT    Patient Name:  Tae Arguelles  Date:  2020  Contact Type:  Telephone    SUBJECTIVE:  Patient Findings     Comments:   The patient was assessed for diet, medication, and activity level changes, missed or extra doses, bruising or bleeding, with no problem findings.          Clinical Outcomes     Comments:   The patient was assessed for diet, medication, and activity level changes, missed or extra doses, bruising or bleeding, with no problem findings.             OBJECTIVE    Recent labs: (last 7 days)     20  1017   INR 2.30*       ASSESSMENT / PLAN  INR assessment THER    Recheck INR In: 6 WEEKS    INR Location Clinic      Anticoagulation Summary  As of 2020    INR goal:   2.0-3.0   TTR:   86.6 % (1 y)   INR used for dosin.30 (2020)   Warfarin maintenance plan:   7.5 mg (5 mg x 1.5) every Mon, Wed, Fri; 5 mg (5 mg x 1) all other days   Full warfarin instructions:   7.5 mg every Mon, Wed, Fri; 5 mg all other days   Weekly warfarin total:   42.5 mg   No change documented:   Rylie Melendez RN   Plan last modified:   Sophia Nguyen RN (2019)   Next INR check:   10/20/2020   Target end date:   Indefinite    Indications    Ischemic cardiomyopathy [I25.5]             Anticoagulation Episode Summary     INR check location:       Preferred lab:       Send INR reminders to:   GINO ESTRADA    Comments:         Anticoagulation Care Providers     Provider Role Specialty Phone number    Janet Alcaraz PA-C Referring Internal Medicine [phone]            See the Encounter Report to view Anticoagulation Flowsheet and Dosing Calendar (Go to Encounters tab in chart review, and find the Anticoagulation Therapy Visit)        Rylie Melendez RN

## 2020-10-20 ENCOUNTER — ANTICOAGULATION THERAPY VISIT (OUTPATIENT)
Dept: NURSING | Facility: CLINIC | Age: 70
End: 2020-10-20
Payer: MEDICARE

## 2020-10-20 DIAGNOSIS — I25.5 ISCHEMIC CARDIOMYOPATHY: ICD-10-CM

## 2020-10-20 LAB
CAPILLARY BLOOD COLLECTION: NORMAL
INR PPP: 3.8 (ref 0.86–1.14)

## 2020-10-20 PROCEDURE — 99207 PR NO CHARGE NURSE ONLY: CPT

## 2020-10-20 PROCEDURE — 85610 PROTHROMBIN TIME: CPT | Performed by: PHYSICIAN ASSISTANT

## 2020-10-20 PROCEDURE — 36416 COLLJ CAPILLARY BLOOD SPEC: CPT | Performed by: PHYSICIAN ASSISTANT

## 2020-10-20 NOTE — PROGRESS NOTES
ANTICOAGULATION FOLLOW-UP CLINIC VISIT    Patient Name:  Tae Arguelles  Date:  10/20/2020  Contact Type:  Telephone    SUBJECTIVE:  Patient Findings     Comments:  The patient was assessed for diet, medication, and activity level changes, missed or extra doses, bruising or bleeding, with no problem findings. 10/3  Injured ribs. Still sl sore.  Tae will also increase his greens          Clinical Outcomes     Comments:  The patient was assessed for diet, medication, and activity level changes, missed or extra doses, bruising or bleeding, with no problem findings. 10/3  Injured ribs. Still sl sore.  Tae will also increase his greens             OBJECTIVE    Recent labs: (last 7 days)     10/20/20  1007   INR 3.80*       ASSESSMENT / PLAN  INR assessment SUPRA    Recheck INR In: 2 WEEKS    INR Location Clinic      Anticoagulation Summary  As of 10/20/2020    INR goal:  2.0-3.0   TTR:  80.5 % (1 y)   INR used for dosing:  3.80 (10/20/2020)   Warfarin maintenance plan:  7.5 mg (5 mg x 1.5) every Mon, Wed, Fri; 5 mg (5 mg x 1) all other days   Full warfarin instructions:  10/20: 2.5 mg; Otherwise 7.5 mg every Mon, Wed, Fri; 5 mg all other days   Weekly warfarin total:  42.5 mg   Plan last modified:  Sophia Nguyen RN (2/19/2019)   Next INR check:  11/3/2020   Target end date:  Indefinite    Indications    Ischemic cardiomyopathy [I25.5]             Anticoagulation Episode Summary     INR check location:      Preferred lab:      Send INR reminders to:  GINO ESTRADA    Comments:        Anticoagulation Care Providers     Provider Role Specialty Phone number    Janet Alcaraz PA-C Referring Internal Medicine [phone]            See the Encounter Report to view Anticoagulation Flowsheet and Dosing Calendar (Go to Encounters tab in chart review, and find the Anticoagulation Therapy Visit)        Rylie Melendez RN

## 2020-11-03 ENCOUNTER — ANTICOAGULATION THERAPY VISIT (OUTPATIENT)
Dept: NURSING | Facility: CLINIC | Age: 70
End: 2020-11-03

## 2020-11-03 DIAGNOSIS — I25.5 ISCHEMIC CARDIOMYOPATHY: ICD-10-CM

## 2020-11-03 LAB
CAPILLARY BLOOD COLLECTION: NORMAL
INR PPP: 2.1 (ref 0.86–1.14)

## 2020-11-03 PROCEDURE — 85610 PROTHROMBIN TIME: CPT | Performed by: PHYSICIAN ASSISTANT

## 2020-11-03 PROCEDURE — 36416 COLLJ CAPILLARY BLOOD SPEC: CPT | Performed by: PHYSICIAN ASSISTANT

## 2020-11-03 NOTE — PROGRESS NOTES
ANTICOAGULATION FOLLOW-UP CLINIC VISIT    Patient Name:  Tae Arguelles  Date:  11/3/2020  Contact Type:  Telephone    SUBJECTIVE:  Patient Findings     Comments:  Signs/symptoms of thrombosis - .nop  The patient was assessed for diet, medication, and activity level changes, missed or extra doses, bruising or bleeding, with no problem findings.        Clinical Outcomes     Comments:  The patient was assessed for diet, medication, and activity level changes, missed or extra doses, bruising or bleeding, with no problem findings.           OBJECTIVE    Recent labs: (last 7 days)     20  1006   INR 2.10*       ASSESSMENT / PLAN  INR assessment THER    Recheck INR In: 4 WEEKS    INR Location Clinic      Anticoagulation Summary  As of 11/3/2020    INR goal:  2.0-3.0   TTR:  78.7 % (1 y)   INR used for dosin.10 (11/3/2020)   Warfarin maintenance plan:  7.5 mg (5 mg x 1.5) every Mon, Wed, Fri; 5 mg (5 mg x 1) all other days   Full warfarin instructions:  7.5 mg every Mon, Wed, Fri; 5 mg all other days   Weekly warfarin total:  42.5 mg   Plan last modified:  Sophia Nguyen RN (2019)   Next INR check:  2020   Target end date:  Indefinite    Indications    Ischemic cardiomyopathy [I25.5]             Anticoagulation Episode Summary     INR check location:      Preferred lab:      Send INR reminders to:  GINO ESTRADA    Comments:        Anticoagulation Care Providers     Provider Role Specialty Phone number    Janet Alcaraz PA-C Referring Internal Medicine [phone]            See the Encounter Report to view Anticoagulation Flowsheet and Dosing Calendar (Go to Encounters tab in chart review, and find the Anticoagulation Therapy Visit)        Rylie Melendez RN

## 2020-12-01 ENCOUNTER — TELEPHONE (OUTPATIENT)
Dept: FAMILY MEDICINE | Facility: CLINIC | Age: 70
End: 2020-12-01

## 2020-12-01 ENCOUNTER — ANTICOAGULATION THERAPY VISIT (OUTPATIENT)
Dept: NURSING | Facility: CLINIC | Age: 70
End: 2020-12-01

## 2020-12-01 DIAGNOSIS — I25.5 ISCHEMIC CARDIOMYOPATHY: ICD-10-CM

## 2020-12-01 DIAGNOSIS — I25.5 ISCHEMIC CARDIOMYOPATHY: Primary | ICD-10-CM

## 2020-12-01 LAB
CAPILLARY BLOOD COLLECTION: NORMAL
INR PPP: 2.4 (ref 0.86–1.14)

## 2020-12-01 PROCEDURE — 85610 PROTHROMBIN TIME: CPT | Performed by: PHYSICIAN ASSISTANT

## 2020-12-01 PROCEDURE — 36416 COLLJ CAPILLARY BLOOD SPEC: CPT | Performed by: PHYSICIAN ASSISTANT

## 2020-12-01 NOTE — PROGRESS NOTES
ANTICOAGULATION MANAGEMENT     Patient Name:  Tae Arguelles  Date:  12/1/2020    ASSESSMENT /SUBJECTIVE:    Today's INR result of 2.4 is therapeutic. Goal INR of 2.0-3.0      Warfarin dose taken: Warfarin taken as instructed    Diet: No new diet changes affecting INR    Medication changes/ interactions: No new medications/supplements affecting INR    Previous INR: Therapeutic     S/S of bleeding or thromboembolism: No    New injury or illness: No    Upcoming surgery, procedure or cardioversion: No    Additional findings: None      PLAN:    Telephone call with Tae regarding INR result and instructed:     Warfarin Dosing Instructions: Continue your current warfarin dose 7.5mg MWF and 5mg AOD    Instructed patient to follow up no later than: 6 weeks  Lab visit scheduled    Education provided: None required      Tae verbalizes understanding and agrees to warfarin dosing plan.    Instructed to call the Anticoagulation Clinic for any changes, questions or concerns. (#[phone])        Stefany Elena RN      OBJECTIVE:  Recent labs: (last 7 days)     12/01/20  1019   INR 2.40*         No question data found.  Anticoagulation Summary  As of 12/1/2020    INR goal:  2.0-3.0   TTR:  78.7 % (1 y)   INR used for dosing:  No new INR was available at the time of this encounter.   Warfarin maintenance plan:  7.5 mg (5 mg x 1.5) every Mon, Wed, Fri; 5 mg (5 mg x 1) all other days   Full warfarin instructions:  7.5 mg every Mon, Wed, Fri; 5 mg all other days   Weekly warfarin total:  42.5 mg   No change documented:  Stefany Elena RN   Plan last modified:  Sophia Nguyen RN (2/19/2019)   Next INR check:  1/12/2021   Priority:  Maintenance   Target end date:  Indefinite    Indications    Ischemic cardiomyopathy [I25.5]             Anticoagulation Episode Summary     INR check location:      Preferred lab:      Send INR reminders to:  GINO ESTRADA    Comments:        Anticoagulation Care Providers     Provider  Role Specialty Phone number    Janet Alcaraz PA-C Referring Internal Medicine [phone]

## 2020-12-05 DIAGNOSIS — I25.5 ISCHEMIC CARDIOMYOPATHY: ICD-10-CM

## 2020-12-07 ENCOUNTER — TRANSFERRED RECORDS (OUTPATIENT)
Dept: HEALTH INFORMATION MANAGEMENT | Facility: CLINIC | Age: 70
End: 2020-12-07

## 2020-12-07 RX ORDER — WARFARIN SODIUM 5 MG/1
TABLET ORAL
Qty: 135 TABLET | Refills: 1 | Status: SHIPPED | OUTPATIENT
Start: 2020-12-07 | End: 2021-03-02

## 2021-01-06 ENCOUNTER — TRANSFERRED RECORDS (OUTPATIENT)
Dept: HEALTH INFORMATION MANAGEMENT | Facility: CLINIC | Age: 71
End: 2021-01-06

## 2021-01-06 LAB
ALT SERPL-CCNC: 26 IU/L (ref 8–45)
AST SERPL-CCNC: 29 IU/L (ref 2–40)
CHOLEST SERPL-MCNC: 157 MG/DL (ref 100–199)
CREAT SERPL-MCNC: 0.77 MG/DL (ref 0.72–1.25)
GFR SERPL CREATININE-BSD FRML MDRD: >60 ML/MIN/1.73M2
GLUCOSE SERPL-MCNC: 116 MG/DL (ref 65–100)
HDLC SERPL-MCNC: 53 MG/DL
LDLC SERPL CALC-MCNC: 56 MG/DL
NONHDLC SERPL-MCNC: 104 MG/DL
POTASSIUM SERPL-SCNC: 4.2 MMOL/L (ref 3.5–5)
TRIGL SERPL-MCNC: 242 MG/DL

## 2021-01-12 ENCOUNTER — ANTICOAGULATION THERAPY VISIT (OUTPATIENT)
Dept: NURSING | Facility: CLINIC | Age: 71
End: 2021-01-12

## 2021-01-12 DIAGNOSIS — I25.5 ISCHEMIC CARDIOMYOPATHY: ICD-10-CM

## 2021-01-12 LAB
CAPILLARY BLOOD COLLECTION: NORMAL
INR PPP: 2.1 (ref 0.86–1.14)

## 2021-01-12 PROCEDURE — 36416 COLLJ CAPILLARY BLOOD SPEC: CPT | Performed by: PHYSICIAN ASSISTANT

## 2021-01-12 PROCEDURE — 85610 PROTHROMBIN TIME: CPT | Performed by: PHYSICIAN ASSISTANT

## 2021-01-12 NOTE — PROGRESS NOTES
ANTICOAGULATION MANAGEMENT     Patient Name:  Tae Arguelles  Date:  1/12/2021    ASSESSMENT /SUBJECTIVE:    Today's INR result of 2.1 is therapeutic. Goal INR of 2.0-3.0      Warfarin dose taken: Warfarin taken as instructed    Diet: No new diet changes affecting INR    Medication changes/ interactions: No new medications/supplements affecting INR    Previous INR: Therapeutic     S/S of bleeding or thromboembolism: No    New injury or illness: No    Upcoming surgery, procedure or cardioversion: No    Additional findings: None      PLAN:    Telephone call with Tae regarding INR result and instructed:     Warfarin Dosing Instructions: Continue your current warfarin dose 7.5mg MWF and 5mg AOD    Instructed patient to follow up no later than: 6 weeks  Lab visit scheduled    Education provided: None required      Tae verbalizes understanding and agrees to warfarin dosing plan.    Instructed to call the Anticoagulation Clinic for any changes, questions or concerns. (#[phone])        Stefany Elena RN      OBJECTIVE:  Recent labs: (last 7 days)     01/12/21  1004   INR 2.10*         No question data found.  Anticoagulation Summary  As of 1/12/2021    INR goal:  2.0-3.0   TTR:  78.7 % (1 y)   INR used for dosing:  No new INR was available at the time of this encounter.   Warfarin maintenance plan:  7.5 mg (5 mg x 1.5) every Mon, Wed, Fri; 5 mg (5 mg x 1) all other days   Full warfarin instructions:  7.5 mg every Mon, Wed, Fri; 5 mg all other days   Weekly warfarin total:  42.5 mg   No change documented:  Stefany Elena RN   Plan last modified:  Sophia Nguyen RN (2/19/2019)   Next INR check:  2/23/2021   Priority:  Maintenance   Target end date:  Indefinite    Indications    Ischemic cardiomyopathy [I25.5]             Anticoagulation Episode Summary     INR check location:      Preferred lab:      Send INR reminders to:  GINO ESTRADA    Comments:        Anticoagulation Care Providers     Provider  Role Specialty Phone number    Janet Alcaraz PA-C Referring Internal Medicine [phone]

## 2021-02-23 ENCOUNTER — ANTICOAGULATION THERAPY VISIT (OUTPATIENT)
Dept: ANTICOAGULATION | Facility: CLINIC | Age: 71
End: 2021-02-23

## 2021-02-23 DIAGNOSIS — I25.5 ISCHEMIC CARDIOMYOPATHY: ICD-10-CM

## 2021-02-23 LAB
CAPILLARY BLOOD COLLECTION: NORMAL
INR PPP: 2.2 (ref 0.86–1.14)

## 2021-02-23 PROCEDURE — 85610 PROTHROMBIN TIME: CPT | Performed by: PHYSICIAN ASSISTANT

## 2021-02-23 PROCEDURE — 99207 PR NO CHARGE NURSE ONLY: CPT

## 2021-02-23 PROCEDURE — 36416 COLLJ CAPILLARY BLOOD SPEC: CPT | Performed by: PHYSICIAN ASSISTANT

## 2021-02-23 NOTE — PROGRESS NOTES
ANTICOAGULATION FOLLOW-UP CLINIC VISIT    Patient Name:  Tae Arguelles  Date:  2021  Contact Type:  Telephone    SUBJECTIVE:  Patient Findings     Comments:  The patient was assessed for diet, medication, and activity level changes, missed or extra doses, bruising or bleeding, with no problem findings.          Clinical Outcomes     Comments:  The patient was assessed for diet, medication, and activity level changes, missed or extra doses, bruising or bleeding, with no problem findings.             OBJECTIVE    Recent labs: (last 7 days)     21  1013   INR 2.20*       ASSESSMENT / PLAN  INR assessment THER    Recheck INR In: 6 WEEKS    INR Location Clinic      Anticoagulation Summary  As of 2021    INR goal:  2.0-3.0   TTR:  78.7 % (1 y)   INR used for dosin.20 (2021)   Warfarin maintenance plan:  7.5 mg (5 mg x 1.5) every Mon, Wed, Fri; 5 mg (5 mg x 1) all other days   Full warfarin instructions:  7.5 mg every Mon, Wed, Fri; 5 mg all other days   Weekly warfarin total:  42.5 mg   No change documented:  Rylie Melendez RN   Plan last modified:  Sophia Nguyen RN (2019)   Next INR check:  2021   Priority:  Maintenance   Target end date:  Indefinite    Indications    Ischemic cardiomyopathy [I25.5]             Anticoagulation Episode Summary     INR check location:      Preferred lab:      Send INR reminders to:  GINO ESTRADA    Comments:        Anticoagulation Care Providers     Provider Role Specialty Phone number    Janet Alcaraz PA-C Referring Internal Medicine [phone]            See the Encounter Report to view Anticoagulation Flowsheet and Dosing Calendar (Go to Encounters tab in chart review, and find the Anticoagulation Therapy Visit)        Rylie Melendez, RN

## 2021-02-25 ENCOUNTER — TELEPHONE (OUTPATIENT)
Dept: FAMILY MEDICINE | Facility: CLINIC | Age: 71
End: 2021-02-25

## 2021-02-25 NOTE — TELEPHONE ENCOUNTER
Reason for Call:  Other covid vaccine    Detailed comments: patients spouse calling wondering if Janet could help him get in somewhere for his covid vaccine. She states no one will take him anywhere.     Phone Number Patient can be reached at: Home number on file [phone] (home)    Best Time: any    Can we leave a detailed message on this number? YES    Call taken on 2/25/2021 at 3:49 PM by Zuleyma Brown

## 2021-02-26 NOTE — TELEPHONE ENCOUNTER
I phoned pt and wife answered.  I gave pt the scheduling ph # [phone].    She and her husb will call.    Pt aware age 75 and above right now    Pt wonders why her 69 yeraold friend could get hers at [employer].     Pt said she heard if they went to [place], [place] they could get it.     Pt will call the 612 ph #.     No access to computer.  Also has the [facility] ph #     Helen CALDERON MA

## 2021-03-02 ENCOUNTER — OFFICE VISIT (OUTPATIENT)
Dept: FAMILY MEDICINE | Facility: CLINIC | Age: 71
End: 2021-03-02
Payer: MEDICARE

## 2021-03-02 VITALS
SYSTOLIC BLOOD PRESSURE: 118 MMHG | TEMPERATURE: 97 F | BODY MASS INDEX: 28.34 KG/M2 | HEART RATE: 83 BPM | OXYGEN SATURATION: 98 % | WEIGHT: 187 LBS | HEIGHT: 68 IN | DIASTOLIC BLOOD PRESSURE: 66 MMHG

## 2021-03-02 DIAGNOSIS — I25.5 ISCHEMIC CARDIOMYOPATHY: ICD-10-CM

## 2021-03-02 DIAGNOSIS — H61.23 BILATERAL IMPACTED CERUMEN: ICD-10-CM

## 2021-03-02 PROCEDURE — 69209 REMOVE IMPACTED EAR WAX UNI: CPT | Performed by: PHYSICIAN ASSISTANT

## 2021-03-02 PROCEDURE — 99212 OFFICE O/P EST SF 10 MIN: CPT | Mod: 25 | Performed by: PHYSICIAN ASSISTANT

## 2021-03-02 RX ORDER — WARFARIN SODIUM 5 MG/1
TABLET ORAL
Qty: 135 TABLET | Refills: 1 | Status: SHIPPED | OUTPATIENT
Start: 2021-03-02 | End: 2021-06-02

## 2021-03-02 ASSESSMENT — MIFFLIN-ST. JEOR: SCORE: 1564.79

## 2021-03-02 NOTE — PROGRESS NOTES
HPI: michael is a pleasant 69 yo male here for ear wash  Has decreased hearing bilat  Has hx of wax build up  Brings in labs done by cardiology since won't need those repeated today  Will be getting new cardiologist since Dr. Alva retired.    Past Medical History:   Diagnosis Date     BPH (benign prostatic hyperplasia)     Dr. Whitfield, PSA 0.27 2012     CAD (coronary artery disease)     MI      CHF (congestive heart failure) (H)      Colon polyp      History of smoking     Quit      HTN (hypertension), benign      Hyperlipidemia LDL goal <100      Ischemic cardiomyopathy     Dr. Alva     Lichen planus      Normal colonoscopy 1-3-08    Repeat 5-10 years     Pacemaker ,     ventricular tachycardia     Pleural effusion      Pneumonia      Pneumothorax     plus rib fx secondary to fall (L)     Seborrheic dermatitis      Toe fracture, right      Past Surgical History:   Procedure Laterality Date     C CABG, ARTERIAL, TWO       COLONOSCOPY N/A 2015    Procedure: COLONOSCOPY;  Surgeon: Zac Austin MD;  Location:  GI     MOUTH SURGERY      Upper and lower dentures     Social History     Tobacco Use     Smoking status: Former Smoker     Quit date: 10/23/1997     Years since quittin.3     Smokeless tobacco: Never Used   Substance Use Topics     Alcohol use: Yes     Alcohol/week: 0.0 standard drinks     Comment: 13 drinks a week     Current Outpatient Medications   Medication Sig Dispense Refill     ASPIRIN NOT PRESCRIBED, INTENTIONAL, 81 each continuous prn. Antiplatelet medication not prescribed intentionally due to Current anticoagulant therapy (warfarin/enoxaparin) 0 each 0     Cholecalciferol (VITAMIN D) 1000 UNITS capsule Take 1 capsule by mouth daily.       fluocinolone (SYNALAR) 0.01 % solution PRN 60 mL 1     FUROSEMIDE 40 MG OR TABS 1 TABLET DAILY       hydrocortisone (WESTCORT) 0.2 % external cream as needed 45 g 1     LIPITOR 20 MG OR TABS 1 TABLET DAILY    PATIENT INSTRUCTIONS  From the office of: Kevin Rocha MD, FACS  GENERAL, BREAST, AND LAPAROSCOPIC SURGERY  [place] [facility]  [address]  [phone]  [place] [facility]  [address]  [phone]      PROCEDURE:  · Seton placement for anal fistula. We placed the seton as discussed in clinic without any complications or changes.    WOUND CARE:  · Do warm soaks in the tub ('sitz bath') for 15-20 minutes at least 3 times per day.  Also do the warm soaks after bowel movements and as needed for comfort.   · Some bleeding is not uncommon.  · The setons are yellow plastic tubing. You may feel them.     ACTIVITY:  · You can gradually resume your regular activities.  Even though you feel tired, it is important to be up and about.  Lying around too much increases your risk of pneumonia or blood clots that can go to your lungs.  Moving around the house or getting out for brief periods is helpful.   · It is okay for you to walk up and down stairs in your house.  · You can resume light exercise like walking as you feel able.    · Remember that you will fatigue more easily for some time, so be sure to rest when needed.  · Do not drive if you are taking prescription pain medications during the day.  When you feel ready to drive, please go to a parking lot with someone and practice driving to confirm that you can maneuver your car safely before driving on the road.     DIET:  · You may resume your regular diet as tolerated.  You may find that lighter foods are easier on your stomach for the first few days.  · The anesthetic and narcotic pain medications can cause constipation.  You should use a stool softener for as long as you are taking any narcotic pain medication.  You may have received a prescription for Colace/Docusate 100 mg by mouth twice daily.  This medication is also available over the counter.   · If you are still struggling with constipation, you can try milk of magnesia.  This is available over the counter and  "    LISINOPRIL 10 MG OR TABS 1 TABLET DAILY       metoprolol (LOPRESSOR) 50 MG tablet Take 1 tablet by mouth 2 times daily. 180 tablet 3     multivitamin, therapeutic with minerals (MULTI-VITAMIN) TABS Take 1 tablet by mouth daily.       NIACIN 750 MG OR TBCR 2 tabs qhs       nitroglycerin (NITROSTAT) 0.4 MG SL tablet Place  under the tongue every 5 minutes as needed.       warfarin ANTICOAGULANT (COUMADIN) 5 MG tablet TAKE 1.5 TABLETS BY MOUTH ON MONDAYS, WEDNESDAYS, FRIDAYS AND TAKE 1 TABLET ALL OTHER DAYS AS DIRECTED. 135 tablet 1     Allergies   Allergen Reactions     Septra [Sulfa Drugs]        PHYSICAL EXAM:    /66 (BP Location: Right arm, Patient Position: Chair, Cuff Size: Adult Large)   Pulse 83   Temp 97  F (36.1  C) (Temporal)   Ht 1.715 m (5' 7.5\")   Wt 84.8 kg (187 lb)   SpO2 98%   BMI 28.86 kg/m      Patient appears non toxic  Ears: bilat cerumen impactions removed with warm water irrig without complications. TMs pearly grey    Assessment and Plan:     (H61.23) Bilateral impacted cerumen  Comment:   Plan: Irrig done without complications.    (I25.5) Ischemic cardiomyopathy  Comment:   Plan: warfarin ANTICOAGULANT (COUMADIN) 5 MG tablet        Refilled    *I also had TC schedule pt and his wife for their covid vaccines      Janet Alcaraz PA-C        " should be taken according to the instructions on the bottle.    PAIN CONTROL:  · Remember that the first 2-4 days are usually the worst in terms of post-procedure pain.  You should see improvement in your pain daily.  · You have been given a prescription for a narcotic pain medication.  Take this medication as directed on the bottle as needed basis for pain.  Taking more than prescribed can result in toxicity, especially from the tylenol component.  · You can also take motrin or ibuprofen 600 mg by mouth every six hours as needed for pain.  This should be taken with food.  You can take motrin/ibuprofen while using the narcotic prescription medication.  It is sometimes helpful to alternate the medications.      WARNINGS:  · Call the clinic with any concerns about your wounds, fevers greater than 101.0 degrees, chills, persistent nausea or vomiting, worsening pain, or any other issues.  During regular hours, a message will be put through to my nurse and she will return your call.  After hours, please ask to have me paged.  Do not hesitate to call anytime with concerns.     FOLLOW-UP:  · Dr. Rocha as scheduled.    WORK OR LEAVE FORMS:  Any paperwork related to work or time off (ex FMLA) should be given to any Patient  ( staff) and they will have you sign an Authorization for Disclosure of Health Information, as required by health privacy laws. The papers are then forwarded to our Forms Completion Team (phone [phone], fax [phone], email: [email or web address]). They will complete our portion, have it signed by me when appropriate, and confirm that it is faxed/mailed to the correct location in a timely fashion. Please note that the process takes 10-14 business days. This paperwork should not be brought to the hospital as there is not a person there to help out. My office will be able to give out return to work forms with any restrictions as needed.      ~~~~ Please follow-up  sooner if your symptoms or problems persist, worsen, or recur or if you have any issues or concerns. ~~~~    Contacting Dr. Rocha  · My clinic hours at 7:30-2:00 on Mondays ([place]) and Thursdays ([place]).   · If you have a life-threatening concern, you should call 911.  · If at any time you have any issues or concerns that are not life-threatening, you can call either clinic. During normal business hours, the reception staff will forward a message to my nursing staff. Your call will be returned by my nurses within a few business hours, even if I am not in clinic. Please alert the  if you feel you need something addressed more urgently.   · If you call after hours, the call center will take your call. They will ask questions and will page me if needed. If the call center is directing you to report to the ER for assessment of a concern that is not life-threatening, please ask them to page me so we can talk before you go to the ER.   · If I am out of town, one of my partners will respond to the call (Dr. Nunez, Dr. Patel, or Dr. Simons).  ·     Scopolamine    A scopolamine patch was placed behind your ear to help reduce nausea and vomiting. This patch is effective for up to 72 hours if left in place. You can choose to wear it for the 72 hours or remove it prior if you so desire.    After you remove the patch:   -Fold the patch in half.   -Throw it away where children and pets can not reach. -Wash your hands very well after discarding the patch.  -Be careful not to touch near your eyes as this medication can cause your eyes to dilate.     Call your doctor if you develop any of these more serious side effects.  -Severe skin rash or hives  -Confusion or memory loss  -Unable to urinate  -Severe eye pain    Side Effects that may be bothersome but less serious:  -Dry mouth, nose or throat  -Drowsiness (Avoid driving or using machinery)   -Constipation  -Blurred vision or sensitivity to light  -Dry, itchy  or red eyes    Acetaminophen Alert-Adult    The safe maximum amount of acetaminophen (Tylenol) for an adult is 4,000 mg. a day. Acetaminophen is often combined with over the counter and prescription medications.Taking too much acetaminophen can harm your liver. It is important to read medication labels to know how much acetaminophen is in each dose of these medications. Below are examples and is not a complete list.     Examples of over the counter medications that contain acetaminophen are:  Actifed Cold & Allergy  Benadryl Allergy & Cold   Coricidin Cold & Flu  Excedrin Migraine   Midol Complete  Nyquil Cold & Flu   Sudafed Cold & Cough  Tylenol PM  All of these contain 325-500 mg acetaminophen per caplet/tablet      Examples of prescription medications that contain acetaminophen are:  Darvocet, Norco, Vicoden: hydrocodone and acetaminophen- 325 mg per tablet  Percocet:  oxycodone and acetaminophen- 325 mg per tablet  Tylenol #3: codeine and acetaminophen- 300 mg per tablet    Acetaminophen alone  Regular- 325 mg.  If you take 2, then dose is 650 mg  Extra Strength- 500 mg.  If you take 2, then dose is 1,000 mg    Please consider how much acetaminophen you are taking a day from various sources. Again, 4,000 mg a day is the maximum safe amount for an adult.      Patient given following \"For Your Well Being\"    _x___ Care After Anesthesia or Sedation    _x_____Partners in Safety      __x_____Pain Management Tips

## 2021-03-04 ENCOUNTER — IMMUNIZATION (OUTPATIENT)
Dept: NURSING | Facility: CLINIC | Age: 71
End: 2021-03-04
Payer: MEDICARE

## 2021-03-04 PROCEDURE — 91300 PR COVID VAC PFIZER DIL RECON 30 MCG/0.3 ML IM: CPT

## 2021-03-04 PROCEDURE — 0001A PR COVID VAC PFIZER DIL RECON 30 MCG/0.3 ML IM: CPT

## 2021-03-25 ENCOUNTER — IMMUNIZATION (OUTPATIENT)
Dept: NURSING | Facility: CLINIC | Age: 71
End: 2021-03-25
Attending: INTERNAL MEDICINE
Payer: MEDICARE

## 2021-03-25 PROCEDURE — 0002A PR COVID VAC PFIZER DIL RECON 30 MCG/0.3 ML IM: CPT

## 2021-03-25 PROCEDURE — 91300 PR COVID VAC PFIZER DIL RECON 30 MCG/0.3 ML IM: CPT

## 2021-04-06 ENCOUNTER — ANTICOAGULATION THERAPY VISIT (OUTPATIENT)
Dept: NURSING | Facility: CLINIC | Age: 71
End: 2021-04-06

## 2021-04-06 DIAGNOSIS — I25.5 ISCHEMIC CARDIOMYOPATHY: ICD-10-CM

## 2021-04-06 LAB — INR PPP: 1.7 (ref 0.86–1.14)

## 2021-04-06 PROCEDURE — 85610 PROTHROMBIN TIME: CPT | Performed by: PHYSICIAN ASSISTANT

## 2021-04-06 PROCEDURE — 36416 COLLJ CAPILLARY BLOOD SPEC: CPT | Performed by: PHYSICIAN ASSISTANT

## 2021-04-06 NOTE — PROGRESS NOTES
ANTICOAGULATION MANAGEMENT     Patient Name:  Tae Arguelles  Date:  4/6/2021    ASSESSMENT /SUBJECTIVE:    Today's INR result of 1.7 is subtherapeutic. Goal INR of 2.0-3.0      Warfarin dose taken: Warfarin taken as instructed    Diet: Increased greens/vitamin K in diet; plans to resume previous intake    Medication changes/ interactions: No new medications/supplements affecting INR    Previous INR: Therapeutic     S/S of bleeding or thromboembolism: No    New injury or illness: No    Upcoming surgery, procedure or cardioversion: No    Additional findings: None      PLAN:    Telephone call with Tae regarding INR result and instructed:     Warfarin Dosing Instructions: 7.5mg then continue your current warfarin dose of 7.5mg MWF and 5mg AOD    Instructed patient to follow up no later than: 2 weeks  Lab visit scheduled    Education provided: Importance of consistent vitamin K intake and Target INR goal and significance of current INR result      Tae verbalizes understanding and agrees to warfarin dosing plan.    Instructed to call the Anticoagulation Clinic for any changes, questions or concerns. (#[phone])        Stefany Elena RN      OBJECTIVE:  Recent labs: (last 7 days)     04/06/21  0958   INR 1.70*         No question data found.  Anticoagulation Summary  As of 4/6/2021    INR goal:  2.0-3.0   TTR:  71.8 % (1 y)   INR used for dosing:  No new INR was available at the time of this encounter.   Warfarin maintenance plan:  7.5 mg (5 mg x 1.5) every Mon, Wed, Fri; 5 mg (5 mg x 1) all other days   Full warfarin instructions:  4/6: 7.5 mg; Otherwise 7.5 mg every Mon, Wed, Fri; 5 mg all other days   Weekly warfarin total:  42.5 mg   Plan last modified:  Sophia Nguyen RN (2/19/2019)   Next INR check:  4/20/2021   Priority:  Maintenance   Target end date:  Indefinite    Indications    Ischemic cardiomyopathy [I25.5]             Anticoagulation Episode Summary     INR check location:      Preferred  lab:      Send INR reminders to:  GINO ESTRADA    Comments:        Anticoagulation Care Providers     Provider Role Specialty Phone number    Janet Alcaraz PA-C Referring Internal Medicine [phone]

## 2021-04-08 DIAGNOSIS — Z79.01 LONG TERM (CURRENT) USE OF ANTICOAGULANTS: Primary | ICD-10-CM

## 2021-04-20 ENCOUNTER — ANTICOAGULATION THERAPY VISIT (OUTPATIENT)
Dept: NURSING | Facility: CLINIC | Age: 71
End: 2021-04-20

## 2021-04-20 DIAGNOSIS — I25.5 ISCHEMIC CARDIOMYOPATHY: ICD-10-CM

## 2021-04-20 DIAGNOSIS — Z79.01 LONG TERM (CURRENT) USE OF ANTICOAGULANTS: ICD-10-CM

## 2021-04-20 LAB
CAPILLARY BLOOD COLLECTION: NORMAL
INR PPP: 2.2 (ref 0.86–1.14)

## 2021-04-20 PROCEDURE — 36416 COLLJ CAPILLARY BLOOD SPEC: CPT | Performed by: PHYSICIAN ASSISTANT

## 2021-04-20 PROCEDURE — 85610 PROTHROMBIN TIME: CPT | Performed by: PHYSICIAN ASSISTANT

## 2021-04-20 NOTE — PROGRESS NOTES
ANTICOAGULATION MANAGEMENT     Patient Name:  Tae Arguelles  Date:  4/20/2021    ASSESSMENT /SUBJECTIVE:    Today's INR result of 2.2 is therapeutic. Goal INR of 2.0-3.0      Warfarin dose taken: Warfarin taken as instructed    Diet: No new diet changes affecting INR    Medication changes/ interactions: No new medications/supplements affecting INR    Previous INR: Subtherapeutic     S/S of bleeding or thromboembolism: No    New injury or illness: No    Upcoming surgery, procedure or cardioversion: No    Additional findings: None      PLAN:    Telephone call with Tae regarding INR result and instructed:     Warfarin Dosing Instructions: Continue your current warfarin dose 7.5mg MWF and 5 mg AOD    Instructed patient to follow up no later than: 4 weeks  Lab visit scheduled    Education provided: Monitoring for bleeding signs and symptoms and Monitoring for clotting signs and symptoms      Tae verbalizes understanding and agrees to warfarin dosing plan.    Instructed to call the Anticoagulation Clinic for any changes, questions or concerns. (#[phone])        Stefany Elena RN      OBJECTIVE:  Recent labs: (last 7 days)     04/20/21  0954   INR 2.20*         INR assessment THER    Recheck INR In: 4 WEEKS    INR Location Clinic      Anticoagulation Summary  As of 4/20/2021    INR goal:  2.0-3.0   TTR:  72.9 % (1 y)   INR used for dosing:  No new INR was available at the time of this encounter.   Warfarin maintenance plan:  7.5 mg (5 mg x 1.5) every Mon, Wed, Fri; 5 mg (5 mg x 1) all other days   Full warfarin instructions:  7.5 mg every Mon, Wed, Fri; 5 mg all other days   Weekly warfarin total:  42.5 mg   No change documented:  Stefany Elena RN   Plan last modified:  Sophia Nguyen RN (2/19/2019)   Next INR check:  5/18/2021   Priority:  Maintenance   Target end date:  Indefinite    Indications    Ischemic cardiomyopathy [I25.5]             Anticoagulation Episode Summary     INR check  location:      Preferred lab:      Send INR reminders to:  GINO ESTRADA    Comments:        Anticoagulation Care Providers     Provider Role Specialty Phone number    Janet Alcaraz PA-C Referring Internal Medicine [phone]

## 2021-05-18 ENCOUNTER — ANTICOAGULATION THERAPY VISIT (OUTPATIENT)
Dept: NURSING | Facility: CLINIC | Age: 71
End: 2021-05-18

## 2021-05-18 DIAGNOSIS — Z79.01 LONG TERM (CURRENT) USE OF ANTICOAGULANTS: ICD-10-CM

## 2021-05-18 DIAGNOSIS — I25.5 ISCHEMIC CARDIOMYOPATHY: ICD-10-CM

## 2021-05-18 LAB
CAPILLARY BLOOD COLLECTION: NORMAL
INR PPP: 1.9 (ref 0.86–1.14)

## 2021-05-18 PROCEDURE — 36416 COLLJ CAPILLARY BLOOD SPEC: CPT | Performed by: PHYSICIAN ASSISTANT

## 2021-05-18 PROCEDURE — 85610 PROTHROMBIN TIME: CPT | Performed by: PHYSICIAN ASSISTANT

## 2021-05-18 NOTE — PROGRESS NOTES
ANTICOAGULATION MANAGEMENT     Patient Name:  Tae Arguelles  Date:  5/18/2021    ASSESSMENT /SUBJECTIVE:    Today's INR result of 1.90 is subtherapeutic. Goal INR of 2.0-3.0      Warfarin dose taken: Warfarin taken as instructed    Diet: Increased greens/vitamin K in diet; plans to resume previous intake    Medication changes/ interactions: No new medications/supplements affecting INR    Previous INR: Therapeutic     S/S of bleeding or thromboembolism: No    New injury or illness: No    Upcoming surgery, procedure or cardioversion: No    Additional findings: None      PLAN:    Telephone call with  Nadira regarding INR result and instructed:     Warfarin Dosing Instructions: 7.5mg then continue your current warfarin dose of 7.5mg MWF and 5mg AOD    Instructed patient to follow up no later than: 2 weeks  Lab visit scheduled    Education provided: Importance of consistent vitamin K intake, Monitoring for bleeding signs and symptoms and Monitoring for clotting signs and symptoms      Nadira verbalizes understanding and agrees to warfarin dosing plan.    Instructed to call the Anticoagulation Clinic for any changes, questions or concerns. (#[phone])        Stefany Elena RN      OBJECTIVE:  Recent labs: (last 7 days)     05/18/21  1025   INR 1.90*         No question data found.  Anticoagulation Summary  As of 5/18/2021    INR goal:  2.0-3.0   TTR:  78.0 % (1 y)   INR used for dosing:  No new INR was available at the time of this encounter.   Warfarin maintenance plan:  7.5 mg (5 mg x 1.5) every Mon, Wed, Fri; 5 mg (5 mg x 1) all other days   Full warfarin instructions:  5/18: 7.5 mg; Otherwise 7.5 mg every Mon, Wed, Fri; 5 mg all other days   Weekly warfarin total:  42.5 mg   Plan last modified:  Sophia Nguyen RN (2/19/2019)   Next INR check:  6/1/2021   Priority:  Maintenance   Target end date:  Indefinite    Indications    Ischemic cardiomyopathy [I25.5]             Anticoagulation Episode Summary      INR check location:      Preferred lab:      Send INR reminders to:  GINO ESTRADA    Comments:        Anticoagulation Care Providers     Provider Role Specialty Phone number    Janet Alcaraz PA-C Referring Internal Medicine [phone]

## 2021-06-02 ENCOUNTER — ANTICOAGULATION THERAPY VISIT (OUTPATIENT)
Dept: NURSING | Facility: CLINIC | Age: 71
End: 2021-06-02

## 2021-06-02 DIAGNOSIS — Z79.01 LONG TERM (CURRENT) USE OF ANTICOAGULANTS: ICD-10-CM

## 2021-06-02 DIAGNOSIS — I25.5 ISCHEMIC CARDIOMYOPATHY: ICD-10-CM

## 2021-06-02 LAB
CAPILLARY BLOOD COLLECTION: NORMAL
INR PPP: 1.9 (ref 0.86–1.14)

## 2021-06-02 PROCEDURE — 99207 PR NO CHARGE NURSE ONLY: CPT

## 2021-06-02 PROCEDURE — 85610 PROTHROMBIN TIME: CPT | Performed by: PHYSICIAN ASSISTANT

## 2021-06-02 PROCEDURE — 36416 COLLJ CAPILLARY BLOOD SPEC: CPT | Performed by: PHYSICIAN ASSISTANT

## 2021-06-02 RX ORDER — WARFARIN SODIUM 5 MG/1
TABLET ORAL
Qty: 150 TABLET | Refills: 0 | Status: SHIPPED | OUTPATIENT
Start: 2021-06-02 | End: 2021-09-21

## 2021-06-02 NOTE — PROGRESS NOTES
ANTICOAGULATION FOLLOW-UP CLINIC VISIT    Patient Name:  Tae Arguelles  Date:  2021  Contact Type:  Telephone/ Patient    SUBJECTIVE:  Patient Findings     Comments:  Pt's INR levels have been sub-therapeutic. Maintenance dose increased by 5.9%. Pt will continue to eat greens 3 days a week.        Clinical Outcomes     Comments:  Pt's INR levels have been sub-therapeutic. Maintenance dose increased by 5.9%. Pt will continue to eat greens 3 days a week.           OBJECTIVE    Recent labs: (last 7 days)     21  1009   INR 1.90*       ASSESSMENT / PLAN  INR assessment SUB    Recheck INR In: 2 WEEKS    INR Location Outside lab      Anticoagulation Summary  As of 2021    INR goal:  2.0-3.0   TTR:  76.2 % (1 y)   INR used for dosin.90 (2021)   Warfarin maintenance plan:  5 mg (5 mg x 1) every Mon, Wed, Fri; 7.5 mg (5 mg x 1.5) all other days   Full warfarin instructions:  : 7.5 mg; Otherwise 5 mg every Mon, Wed, Fri; 7.5 mg all other days   Weekly warfarin total:  45 mg   Plan last modified:  Yajaira Bledsoe RN (2021)   Next INR check:  6/15/2021   Priority:  Maintenance   Target end date:  Indefinite    Indications    Ischemic cardiomyopathy [I25.5]             Anticoagulation Episode Summary     INR check location:      Preferred lab:      Send INR reminders to:  GINO ESTRADA    Comments:        Anticoagulation Care Providers     Provider Role Specialty Phone number    Janet Alcaraz PA-C Referring Internal Medicine [phone]            See the Encounter Report to view Anticoagulation Flowsheet and Dosing Calendar (Go to Encounters tab in chart review, and find the Anticoagulation Therapy Visit)    Pt INR is 1.9 today. See findings. Pt advised to take 7.5 mg of warfarin today 21. Maintenance dose increased. Pt advised to take 5 mg of warfarin on Mon/Wed/Fri and 7.5 mg all other days. Recheck INR in 2 weeks scheduled on 6/15/21 in [place]. Rx refilled per request. Tae  aware if signs of clotting (pain, tenderness, swelling, color change in leg or arm, SOB) and bleeding occur (blood in stool, urine, large bruising, bleeding gums, nosebleeds) to have INR check sooner. If sx severe report to ER or concerned for stroke call 911. If general questions or concerns arise, call clinic.         Yajaira Bledsoe RN

## 2021-06-08 ENCOUNTER — OFFICE VISIT (OUTPATIENT)
Dept: FAMILY MEDICINE | Facility: CLINIC | Age: 71
End: 2021-06-08
Payer: MEDICARE

## 2021-06-08 VITALS
OXYGEN SATURATION: 98 % | TEMPERATURE: 97.3 F | HEIGHT: 68 IN | WEIGHT: 184 LBS | HEART RATE: 83 BPM | DIASTOLIC BLOOD PRESSURE: 63 MMHG | SYSTOLIC BLOOD PRESSURE: 123 MMHG | BODY MASS INDEX: 27.89 KG/M2

## 2021-06-08 DIAGNOSIS — Z95.0 PACEMAKER: ICD-10-CM

## 2021-06-08 DIAGNOSIS — I25.5 ISCHEMIC CARDIOMYOPATHY: ICD-10-CM

## 2021-06-08 DIAGNOSIS — E78.5 HYPERLIPIDEMIA LDL GOAL <100: ICD-10-CM

## 2021-06-08 DIAGNOSIS — Z00.00 ENCOUNTER FOR ANNUAL WELLNESS EXAM IN MEDICARE PATIENT: Primary | ICD-10-CM

## 2021-06-08 DIAGNOSIS — I10 HTN (HYPERTENSION), BENIGN: ICD-10-CM

## 2021-06-08 DIAGNOSIS — N40.0 BENIGN PROSTATIC HYPERPLASIA WITHOUT LOWER URINARY TRACT SYMPTOMS: ICD-10-CM

## 2021-06-08 PROCEDURE — G0439 PPPS, SUBSEQ VISIT: HCPCS | Performed by: PHYSICIAN ASSISTANT

## 2021-06-08 ASSESSMENT — ACTIVITIES OF DAILY LIVING (ADL): CURRENT_FUNCTION: NO ASSISTANCE NEEDED

## 2021-06-08 ASSESSMENT — MIFFLIN-ST. JEOR: SCORE: 1551.18

## 2021-06-08 NOTE — PROGRESS NOTES
"SUBJECTIVE:   Tae Arguelles is a 70 year old male who presents for Preventive Visit.    Pt would like to transfer care to [facility] from [facility] since Dr. Alva retired.  He last saw urology in Jan and had a normal PSA then.      Patient has been advised of split billing requirements and indicates understanding: Yes   Are you in the first 12 months of your Medicare coverage?  No    Healthy Habits:     In general, how would you rate your overall health?  Very good    Frequency of exercise:  2-3 days/week    Duration of exercise:  45-60 minutes    Do you usually eat at least 4 servings of fruit and vegetables a day, include whole grains    & fiber and avoid regularly eating high fat or \"junk\" foods?  Yes    Taking medications regularly:  Yes    Medication side effects:  None    Ability to successfully perform activities of daily living:  No assistance needed    Home Safety:  Lack of grab bars in the bathroom    Hearing Impairment:  No hearing concerns    In the past 6 months, have you been bothered by leaking of urine?  No    In general, how would you rate your overall mental or emotional health?  Very good      PHQ-2 Total Score: 0    Additional concerns today:  Yes    Do you feel safe in your environment? Yes    Have you ever done Advance Care Planning? (For example, a Health Directive, POLST, or a discussion with a medical provider or your loved ones about your wishes): No, advance care planning information given to patient to review.  Patient plans to discuss their wishes with loved ones or provider.         Fall risk  Fallen 2 or more times in the past year?: No  Any fall with injury in the past year?: No    Cognitive Screening   1) Repeat 3 items (Leader, Season, Table)    2) Clock draw: NORMAL  3) 3 item recall: Recalls 3 objects  Results: 3 items recalled: COGNITIVE IMPAIRMENT LESS LIKELY    Mini-CogTM Copyright S Lala. Licensed by the author for use in [facility]; reprinted with " permission (tere@[facility]). All rights reserved.      Do you have sleep apnea, excessive snoring or daytime drowsiness?: no    Reviewed and updated as needed this visit by clinical staff  Tobacco  Allergies  Meds              Reviewed and updated as needed this visit by Provider                Social History     Tobacco Use     Smoking status: Former Smoker     Quit date: 10/23/1997     Years since quittin.6     Smokeless tobacco: Never Used   Substance Use Topics     Alcohol use: Yes     Alcohol/week: 0.0 standard drinks     Comment: 13 drinks a week     If you drink alcohol do you typically have >3 drinks per day or >7 drinks per week? No    Alcohol Use 2021   Prescreen: >3 drinks/day or >7 drinks/week? No         Current providers sharing in care for this patient include:   Patient Care Team:  Janet Alcaraz PA-C as PCP - General (Internal Medicine)  Janet Alcaraz PA-C as Assigned PCP    The following health maintenance items are reviewed in Epic and correct as of today:  Health Maintenance Due   Topic Date Due     TSH W/FREE T4 REFLEX  Never done     ANNUAL REVIEW OF HM ORDERS  Never done     AORTIC ANEURYSM SCREENING (SYSTEM ASSIGNED)  Never done     ZOSTER IMMUNIZATION (2 of 3) 2013     HF ACTION PLAN  2014     BMP  2020     CBC  2020       Past Medical History:   Diagnosis Date     BPH (benign prostatic hyperplasia)     Dr. Whitfield, PSA 0.27 2012     CAD (coronary artery disease)     MI      CHF (congestive heart failure) (H)      Colon polyp      History of smoking     Quit      HTN (hypertension), benign      Hyperlipidemia LDL goal <100      Ischemic cardiomyopathy     Dr. Alva     Lichen planus      Normal colonoscopy 1-3-08    Repeat 5-10 years     Pacemaker ,     ventricular tachycardia     Pleural effusion      Pneumonia      Pneumothorax     plus rib fx secondary to fall (L)     Seborrheic dermatitis      Toe fracture, right      Past  "Surgical History:   Procedure Laterality Date     C CABG, ARTERIAL, TWO  1997     COLONOSCOPY N/A 2/12/2015    Procedure: COLONOSCOPY;  Surgeon: Zac Austin MD;  Location:  GI     MOUTH SURGERY  2001    Upper and lower dentures     Current Outpatient Medications   Medication Sig Dispense Refill     Cholecalciferol (VITAMIN D) 1000 UNITS capsule Take 1 capsule by mouth daily.       fluocinolone (SYNALAR) 0.01 % solution PRN 60 mL 1     FUROSEMIDE 40 MG OR TABS 1 TABLET DAILY       hydrocortisone (WESTCORT) 0.2 % external cream as needed 45 g 1     LIPITOR 20 MG OR TABS 1 TABLET DAILY       LISINOPRIL 10 MG OR TABS 1 TABLET DAILY       metoprolol (LOPRESSOR) 50 MG tablet Take 1 tablet by mouth 2 times daily. 180 tablet 3     multivitamin, therapeutic with minerals (MULTI-VITAMIN) TABS Take 1 tablet by mouth daily.       NIACIN 750 MG OR TBCR 2 tabs qhs       nitroglycerin (NITROSTAT) 0.4 MG SL tablet Place  under the tongue every 5 minutes as needed.       warfarin ANTICOAGULANT (COUMADIN) 5 MG tablet Take 1 tablet (5 mg) on Mondays/Wednesdays/Fridays and take 1.5 tablets (7.5 mg) all other days or as directed by the INR clinic 150 tablet 0     ASPIRIN NOT PRESCRIBED, INTENTIONAL, 81 each continuous prn. Antiplatelet medication not prescribed intentionally due to Current anticoagulant therapy (warfarin/enoxaparin) (Patient not taking: Reported on 6/8/2021) 0 each 0             Review of Systems  Constitutional, HEENT, cardiovascular, pulmonary, GI, , musculoskeletal, neuro, skin, endocrine and psych systems are negative, except as otherwise noted.    OBJECTIVE:   /63 (BP Location: Left arm, Patient Position: Sitting, Cuff Size: Adult Regular)   Pulse 83   Temp 97.3  F (36.3  C) (Temporal)   Ht 1.715 m (5' 7.5\")   Wt 83.5 kg (184 lb)   SpO2 98%   BMI 28.39 kg/m   Estimated body mass index is 28.39 kg/m  as calculated from the following:    Height as of this encounter: 1.715 m (5' 7.5\").    " Weight as of this encounter: 83.5 kg (184 lb).  Physical Exam  GENERAL: healthy, alert and no distress  EYES: Eyes grossly normal to inspection, PERRL and conjunctivae and sclerae normal  HENT: ear canals and TM's normal, nose and mouth without ulcers or lesions  NECK: no adenopathy, no asymmetry, masses, or scars and thyroid normal to palpation  RESP: lungs clear to auscultation - no rales, rhonchi or wheezes  CV: regular rate and rhythm, normal S1 S2, no S3 or S4, no murmur, click or rub, no peripheral edema and peripheral pulses strong  ABDOMEN: soft, nontender, no hepatosplenomegaly, no masses and bowel sounds normal  MS: no gross musculoskeletal defects noted, no edema  SKIN: no suspicious lesions or rashes  NEURO: Normal strength and tone, mentation intact and speech normal  PSYCH: mentation appears normal, affect normal/bright        ASSESSMENT / PLAN:   Assessment and Plan:     (Z00.00) Encounter for annual wellness exam in Medicare patient  (primary encounter diagnosis)  Comment:   Plan: pt has updated labs so those not repeated today. Recd he check insurance RE: shingrex.    (Z95.0) Pacemaker  Comment: pt was followed by Dr. Alva who has retired. Wants to transfer to  for cardiology now.  Plan: CARDIOLOGY EVAL ADULT REFERRAL            (I25.5) Ischemic cardiomyopathy  Comment:   Plan: CARDIOLOGY EVAL ADULT REFERRAL            (I10) HTN (hypertension), benign  Comment:   Plan: well controlled, cont same.    (E78.5) Hyperlipidemia LDL goal <100  Comment:   Plan: cont lipitor  Recent Labs   Lab Test 01/06/21 11/13/19  1211   CHOL 157 172   HDL 53 54   LDL 56 90   TRIG 242* 138         (N40.0) Benign prostatic hyperplasia without lower urinary tract symptoms  Comment:   Plan: followed by urology        Patient has been advised of split billing requirements and indicates understanding: Yes  COUNSELING:  Reviewed preventive health counseling, as reflected in patient instructions    Estimated body mass index  "is 28.39 kg/m  as calculated from the following:    Height as of this encounter: 1.715 m (5' 7.5\").    Weight as of this encounter: 83.5 kg (184 lb).        He reports that he quit smoking about 23 years ago. He has never used smokeless tobacco.      Appropriate preventive services were discussed with this patient, including applicable screening as appropriate for cardiovascular disease, diabetes, osteopenia/osteoporosis, and glaucoma.  As appropriate for age/gender, discussed screening for colorectal cancer, prostate cancer, breast cancer, and cervical cancer. Checklist reviewing preventive services available has been given to the patient.    Reviewed patients plan of care and provided an AVS. The Basic Care Plan (routine screening as documented in Health Maintenance) for Tae meets the Care Plan requirement. This Care Plan has been established and reviewed with the Patient.    Counseling Resources:  ATP IV Guidelines  Pooled Cohorts Equation Calculator  Breast Cancer Risk Calculator  Breast Cancer: Medication to Reduce Risk  FRAX Risk Assessment  ICSI Preventive Guidelines  Dietary Guidelines for Americans, 2010  [employer]'s MyPlate  ASA Prophylaxis  Lung CA Screening    Janet Alcaraz PA-C  [facility]    Identified Health Risks:  "

## 2021-06-15 ENCOUNTER — ANTICOAGULATION THERAPY VISIT (OUTPATIENT)
Dept: ANTICOAGULATION | Facility: CLINIC | Age: 71
End: 2021-06-15

## 2021-06-15 DIAGNOSIS — Z79.01 LONG TERM (CURRENT) USE OF ANTICOAGULANTS: ICD-10-CM

## 2021-06-15 DIAGNOSIS — I25.5 ISCHEMIC CARDIOMYOPATHY: ICD-10-CM

## 2021-06-15 LAB
CAPILLARY BLOOD COLLECTION: NORMAL
INR PPP: 2.1 (ref 0.86–1.14)

## 2021-06-15 PROCEDURE — 36416 COLLJ CAPILLARY BLOOD SPEC: CPT | Performed by: PHYSICIAN ASSISTANT

## 2021-06-15 PROCEDURE — 85610 PROTHROMBIN TIME: CPT | Performed by: PHYSICIAN ASSISTANT

## 2021-06-15 PROCEDURE — 99207 PR NO CHARGE NURSE ONLY: CPT

## 2021-06-15 NOTE — PROGRESS NOTES
ANTICOAGULATION FOLLOW-UP CLINIC VISIT    Patient Name:  Tae Arguelles  Date:  6/15/2021  Contact Type:  Telephone    SUBJECTIVE:  Patient Findings     Comments:  The patient was assessed for diet, medication, and activity level changes, missed or extra doses, bruising or bleeding, with no problem findings.          Clinical Outcomes     Comments:  The patient was assessed for diet, medication, and activity level changes, missed or extra doses, bruising or bleeding, with no problem findings.             OBJECTIVE    Recent labs: (last 7 days)     06/15/21  1006   INR 2.10*       ASSESSMENT / PLAN  INR assessment THER    Recheck INR In: 4 WEEKS    INR Location Clinic      Anticoagulation Summary  As of 6/15/2021    INR goal:  2.0-3.0   TTR:  74.4 % (1 y)   INR used for dosin.10 (6/15/2021)   Warfarin maintenance plan:  5 mg (5 mg x 1) every Mon, Wed, Fri; 7.5 mg (5 mg x 1.5) all other days   Full warfarin instructions:  5 mg every Mon, Wed, Fri; 7.5 mg all other days   Weekly warfarin total:  45 mg   Plan last modified:  Yajaira Bledsoe RN (2021)   Next INR check:  2021   Priority:  Maintenance   Target end date:  Indefinite    Indications    Ischemic cardiomyopathy [I25.5]             Anticoagulation Episode Summary     INR check location:      Preferred lab:      Send INR reminders to:  GINO ESTRADA    Comments:        Anticoagulation Care Providers     Provider Role Specialty Phone number    Janet Alcaraz PA-C Referring Internal Medicine [phone]            See the Encounter Report to view Anticoagulation Flowsheet and Dosing Calendar (Go to Encounters tab in chart review, and find the Anticoagulation Therapy Visit)        Rylie Melendez RN

## 2021-06-18 ENCOUNTER — ANCILLARY PROCEDURE (OUTPATIENT)
Dept: CARDIOLOGY | Facility: CLINIC | Age: 71
End: 2021-06-18
Attending: INTERNAL MEDICINE
Payer: MEDICARE

## 2021-06-18 ENCOUNTER — OFFICE VISIT (OUTPATIENT)
Dept: CARDIOLOGY | Facility: CLINIC | Age: 71
End: 2021-06-18
Attending: PHYSICIAN ASSISTANT
Payer: MEDICARE

## 2021-06-18 VITALS
OXYGEN SATURATION: 99 % | WEIGHT: 181.7 LBS | BODY MASS INDEX: 27.54 KG/M2 | DIASTOLIC BLOOD PRESSURE: 70 MMHG | SYSTOLIC BLOOD PRESSURE: 120 MMHG | HEART RATE: 75 BPM | HEIGHT: 68 IN

## 2021-06-18 DIAGNOSIS — Z95.810 ICD (IMPLANTABLE CARDIOVERTER-DEFIBRILLATOR) IN PLACE: Primary | ICD-10-CM

## 2021-06-18 DIAGNOSIS — I25.5 ISCHEMIC CARDIOMYOPATHY: ICD-10-CM

## 2021-06-18 DIAGNOSIS — Z95.0 CARDIAC PACEMAKER IN SITU: ICD-10-CM

## 2021-06-18 DIAGNOSIS — Z95.0 PACEMAKER: ICD-10-CM

## 2021-06-18 PROCEDURE — 93282 PRGRMG EVAL IMPLANTABLE DFB: CPT | Performed by: INTERNAL MEDICINE

## 2021-06-18 PROCEDURE — 99204 OFFICE O/P NEW MOD 45 MIN: CPT | Performed by: INTERNAL MEDICINE

## 2021-06-18 RX ORDER — ATORVASTATIN CALCIUM 20 MG/1
20 TABLET, FILM COATED ORAL
COMMUNITY
Start: 2020-12-07 | End: 2021-12-08

## 2021-06-18 ASSESSMENT — MIFFLIN-ST. JEOR: SCORE: 1540.75

## 2021-06-18 NOTE — LETTER
6/18/2021    Janet Alcaraz PA-C  [address]  [facility] [address]    RE: Tae Arguelles       Dear Colleague,    I had the pleasure of seeing Tae Arguelles in the [facility].    Electrophysiology/ Clinic Note         H&P and Plan:     REASON FOR VISIT: Electrophysiology evaluation.      HISTORY OF PRESENT ILLNESS: Patient is a pleasant 70-year-old gentleman with a history of hypertension, hyperlipidemia and heart failure secondary to ischemic cardiomyopathy (CABG and ICD implantation in 1997; generator change 2015), who is here to establish care with us.    Patient initially presented with anterior STEMI back in 1970.  At that time, he underwent emergent bypass surgery and later on had ICD implantation.  He was followed at [facility], however his doctor recently retired and he decided to establish care with us.    Today, he informs he is doing well and has no complaints at this visit.  He denies symptoms of chest pain, shortness of breath, headedness, near-syncope or syncope.    Of note, he is on chronic therapy with Coumadin.  However, he denies any history of A. fib or DVT and does not know the exact indication for Coumadin.  I was not able to find out the indication based on previous notes.    Device was interrogated today.  Patient is known to have chronic elevated RV lead threshold (2.75 V at 0.4 ms).  Lead parameters seem to be stable.  No ventricular arrhythmia was seen.    PREVIOUS STUDIES (personally reviewed):  -Echo (2018): Severe LV dysfunction.  EF estimated 32%.  There was moderate MR  -Stress PET (2018): Large area of nontransmural/transmural infarct was noticed in the basal, mid and apical anteroseptal wall.  No ischemia was seen.      ASSESSMENT AND PLAN:   1.  Device care.  Patient is known to have elevated RV lead threshold.  However threshold seems to be stable.  We will continue device checks into the months.   2.    " Heart failure secondary ischemic cardiomyopathy.  Euvolemic on physical exam.  He is asymptomatic.   We will continue therapy with Ventolin, Lasix, lisinopril, metoprolol and Coumadin.  We will refer him to long-term cardiologist team as well.    Of note, after extensive chart review I was not able to identify the indication for Coumadin.  We will continue pharmacotherapy for now.  We will contact our group to obtain more information.  Sandoval Marroquin MD    Physical Exam:  Vitals: /70   Pulse 75   Ht 1.715 m (5' 7.5\")   Wt 82.4 kg (181 lb 11.2 oz)   SpO2 99%   BMI 28.04 kg/m      Constitutional:  AAO x3.  Pt is in NAD.  HEAD: normocephalic.  SKIN: Skin normal color, texture and turgor with no lesions or eruptions.  Eyes: PERRL, EOMI.  ENT:  Supple, normal JVP. No lymphadenopathy or thyroid enlargement.  Chest:  CTAB.  Cardiac:    RRR, normal  S1 and S2.  No murmurs rubs or gallop.    Abdomen:  Normal BS.  Soft, non-tender and non-distended.  No rebound or guarding.    Extremities:  Pedious pulses palpable B/L.  No LE edema noticed.   Neurological: Strength and sensation grossly symmetric and intact throughout.       CURRENT MEDICATIONS:  Current Outpatient Medications   Medication Sig Dispense Refill     Cholecalciferol (VITAMIN D) 1000 UNITS capsule Take 1 capsule by mouth daily.       fluocinolone (SYNALAR) 0.01 % solution PRN 60 mL 1     FUROSEMIDE 40 MG OR TABS 1 TABLET DAILY       hydrocortisone (WESTCORT) 0.2 % external cream as needed 45 g 1     LIPITOR 20 MG OR TABS 1 TABLET DAILY       LISINOPRIL 10 MG OR TABS 1 TABLET DAILY       metoprolol (LOPRESSOR) 50 MG tablet Take 1 tablet by mouth 2 times daily. 180 tablet 3     multivitamin, therapeutic with minerals (MULTI-VITAMIN) TABS Take 1 tablet by mouth daily.       NIACIN 750 MG OR TBCR 2 tabs qhs       nitroglycerin (NITROSTAT) 0.4 MG SL tablet Place  under the tongue every 5 minutes as needed.       warfarin ANTICOAGULANT (COUMADIN) 5 MG tablet " Take 1 tablet (5 mg) on // and take 1.5 tablets (7.5 mg) all other days or as directed by the INR clinic 150 tablet 0       ALLERGIES     Allergies   Allergen Reactions     Septra [[facility] [employer]]        PAST MEDICAL HISTORY:  Past Medical History:   Diagnosis Date     BPH (benign prostatic hyperplasia)     Dr. Whitfield, PSA 0.27 2012     CAD (coronary artery disease)     MI      CHF (congestive heart failure) (H)      Colon polyp      History of smoking     Quit      HTN (hypertension), benign      Hyperlipidemia LDL goal <100      Ischemic cardiomyopathy     Dr. Alva     Lichen planus      Normal colonoscopy 1-3-08    Repeat 5-10 years     Pacemaker ,     ventricular tachycardia     Pleural effusion      Pneumonia      Pneumothorax     plus rib fx secondary to fall (L)     Seborrheic dermatitis      Toe fracture, right        PAST SURGICAL HISTORY:  Past Surgical History:   Procedure Laterality Date     C CABG, ARTERIAL, TWO       COLONOSCOPY N/A 2015    Procedure: COLONOSCOPY;  Surgeon: Zac Austin MD;  Location:  GI     MOUTH SURGERY      Upper and lower dentures       FAMILY HISTORY:  Family History   Problem Relation Age of Onset     Diabetes Father      Cancer Father 73        lung and bone ca     Diabetes Sister      Kidney Cancer Sister 77     Breast Cancer Sister      Uterine Cancer Sister      Hypertension Mother      Cerebrovascular Disease Mother          age 75     Thyroid Disease Mother      Kidney Cancer Brother      Diabetes Brother      Heart Defect Brother        SOCIAL HISTORY:  Social History     Socioeconomic History     Marital status:      Spouse name: Not on file     Number of children: Not on file     Years of education: Not on file     Highest education level: Not on file   Occupational History     Not on file   Social Needs     Financial resource strain: Not on file     Food insecurity     Worry: Not on  file     Inability: Not on file     Transportation needs     Medical: Not on file     Non-medical: Not on file   Tobacco Use     Smoking status: Former Smoker     Quit date: 10/23/1997     Years since quittin.6     Smokeless tobacco: Never Used   Substance and Sexual Activity     Alcohol use: Yes     Alcohol/week: 0.0 standard drinks     Comment: 13 drinks a week     Drug use: No     Sexual activity: Yes     Partners: Female   Lifestyle     Physical activity     Days per week: Not on file     Minutes per session: Not on file     Stress: Not on file   Relationships     Social connections     Talks on phone: Not on file     Gets together: Not on file     Attends Mosque service: Not on file     Active member of club or organization: Not on file     Attends meetings of clubs or organizations: Not on file     Relationship status: Not on file     Intimate partner violence     Fear of current or ex partner: Not on file     Emotionally abused: Not on file     Physically abused: Not on file     Forced sexual activity: Not on file   Other Topics Concern     Not on file   Social History Narrative    , wife Nadira.       Review of Systems:  Skin:        Eyes:       ENT:       Respiratory:       Cardiovascular:       Gastroenterology:      Genitourinary:       Musculoskeletal:       Neurologic:       Psychiatric:       Heme/Lymph/Imm:       Endocrine:           Recent Lab Results:  LIPID RESULTS:  Lab Results   Component Value Date    CHOL 157 2021    HDL 53 2021    LDL 56 2021    TRIG 242 (H) 2021       LIVER ENZYME RESULTS:  Lab Results   Component Value Date    AST 29 2021    ALT 26 2021       CBC RESULTS:  Lab Results   Component Value Date    WBC 7.1 2019    RBC 3.90 (L) 2019    HGB 14.4 2019    HCT 41.3 2019     (H) 2019    MCH 36.9 (H) 2019    MCHC 34.9 2019    RDW 11.4 2019     (L) 2019       BMP  RESULTS:  Lab Results   Component Value Date     11/13/2019    POTASSIUM 4.2 01/06/2021    CHLORIDE 103 11/13/2019    CO2 25 11/13/2019    ANIONGAP 7 11/13/2019     (A) 01/06/2021    BUN 17 11/13/2019    CR 0.77 01/06/2021    GFRESTIMATED >60 01/06/2021    GFRESTBLACK >60 01/06/2021    SALOME 9.2 11/13/2019        A1C RESULTS:  Lab Results   Component Value Date    A1C 5.2 11/12/2018       INR RESULTS:  Lab Results   Component Value Date    INR 2.10 (H) 06/15/2021    INR 1.90 (H) 06/02/2021         ECHOCARDIOGRAM  No results found for this or any previous visit (from the past 8760 hour(s)).      No orders of the defined types were placed in this encounter.    No orders of the defined types were placed in this encounter.    There are no discontinued medications.      Encounter Diagnoses   Name Primary?     Pacemaker      Ischemic cardiomyopathy      Thank you for allowing me to participate in the care of your patient.      Sincerely,     Sandoval Marroquin MD     [facility]    cc:   Janet Alcaraz PA-C  [address]  [place] [address]

## 2021-06-18 NOTE — PROGRESS NOTES
Electrophysiology/ Clinic Note         H&P and Plan:     REASON FOR VISIT: Electrophysiology evaluation.      HISTORY OF PRESENT ILLNESS: Patient is a pleasant 70-year-old gentleman with a history of hypertension, hyperlipidemia and heart failure secondary to ischemic cardiomyopathy (CABG and ICD implantation in 1997; generator change 2015), who is here to establish care with us.    Patient initially presented with anterior STEMI back in 1970.  At that time, he underwent emergent bypass surgery and later on had ICD implantation.  He was followed at [facility], however his doctor recently retired and he decided to establish care with us.    Today, he informs he is doing well and has no complaints at this visit.  He denies symptoms of chest pain, shortness of breath, headedness, near-syncope or syncope.    Of note, he is on chronic therapy with Coumadin.  However, he denies any history of A. fib or DVT and does not know the exact indication for Coumadin.  I was not able to find out the indication based on previous notes.    Device was interrogated today.  Patient is known to have chronic elevated RV lead threshold (2.75 V at 0.4 ms).  Lead parameters seem to be stable.  No ventricular arrhythmia was seen.    PREVIOUS STUDIES (personally reviewed):  -Echo (2018): Severe LV dysfunction.  EF estimated 32%.  There was moderate MR  -Stress PET (2018): Large area of nontransmural/transmural infarct was noticed in the basal, mid and apical anteroseptal wall.  No ischemia was seen.      ASSESSMENT AND PLAN:   1.  Device care.  Patient is known to have elevated RV lead threshold.  However threshold seems to be stable.  We will continue device checks into the months.   2.    Heart failure secondary ischemic cardiomyopathy.  Euvolemic on physical exam.  He is asymptomatic.   We will continue therapy with Ventolin, Lasix, lisinopril, metoprolol and Coumadin.  We will refer him to long-term cardiologist team as well.    Of note,  "after extensive chart review I was not able to identify the indication for Coumadin.  We will continue pharmacotherapy for now.  We will contact our group to obtain more information.  Sandoval Marroquin MD    Physical Exam:  Vitals: /70   Pulse 75   Ht 1.715 m (5' 7.5\")   Wt 82.4 kg (181 lb 11.2 oz)   SpO2 99%   BMI 28.04 kg/m      Constitutional:  AAO x3.  Pt is in NAD.  HEAD: normocephalic.  SKIN: Skin normal color, texture and turgor with no lesions or eruptions.  Eyes: PERRL, EOMI.  ENT:  Supple, normal JVP. No lymphadenopathy or thyroid enlargement.  Chest:  CTAB.  Cardiac:    RRR, normal  S1 and S2.  No murmurs rubs or gallop.    Abdomen:  Normal BS.  Soft, non-tender and non-distended.  No rebound or guarding.    Extremities:  Pedious pulses palpable B/L.  No LE edema noticed.   Neurological: Strength and sensation grossly symmetric and intact throughout.       CURRENT MEDICATIONS:  Current Outpatient Medications   Medication Sig Dispense Refill     Cholecalciferol (VITAMIN D) 1000 UNITS capsule Take 1 capsule by mouth daily.       fluocinolone (SYNALAR) 0.01 % solution PRN 60 mL 1     FUROSEMIDE 40 MG OR TABS 1 TABLET DAILY       hydrocortisone (WESTCORT) 0.2 % external cream as needed 45 g 1     LIPITOR 20 MG OR TABS 1 TABLET DAILY       LISINOPRIL 10 MG OR TABS 1 TABLET DAILY       metoprolol (LOPRESSOR) 50 MG tablet Take 1 tablet by mouth 2 times daily. 180 tablet 3     multivitamin, therapeutic with minerals (MULTI-VITAMIN) TABS Take 1 tablet by mouth daily.       NIACIN 750 MG OR TBCR 2 tabs qhs       nitroglycerin (NITROSTAT) 0.4 MG SL tablet Place  under the tongue every 5 minutes as needed.       warfarin ANTICOAGULANT (COUMADIN) 5 MG tablet Take 1 tablet (5 mg) on Mondays/Wednesdays/Fridays and take 1.5 tablets (7.5 mg) all other days or as directed by the INR clinic 150 tablet 0       ALLERGIES     Allergies   Allergen Reactions     Septra [Sulfa Drugs]        PAST MEDICAL HISTORY:  Past " Medical History:   Diagnosis Date     BPH (benign prostatic hyperplasia)     Dr. Whitfield, PSA 0.27 2012     CAD (coronary artery disease)     MI      CHF (congestive heart failure) (H)      Colon polyp      History of smoking     Quit      HTN (hypertension), benign      Hyperlipidemia LDL goal <100      Ischemic cardiomyopathy     Dr. Alva     Lichen planus      Normal colonoscopy 1-3-08    Repeat 5-10 years     Pacemaker ,     ventricular tachycardia     Pleural effusion      Pneumonia      Pneumothorax     plus rib fx secondary to fall (L)     Seborrheic dermatitis      Toe fracture, right        PAST SURGICAL HISTORY:  Past Surgical History:   Procedure Laterality Date     C CABG, ARTERIAL, TWO       COLONOSCOPY N/A 2015    Procedure: COLONOSCOPY;  Surgeon: Zac Austin MD;  Location:  GI     MOUTH SURGERY      Upper and lower dentures       FAMILY HISTORY:  Family History   Problem Relation Age of Onset     Diabetes Father      Cancer Father 73        lung and bone ca     Diabetes Sister      Kidney Cancer Sister 77     Breast Cancer Sister      Uterine Cancer Sister      Hypertension Mother      Cerebrovascular Disease Mother          age 75     Thyroid Disease Mother      Kidney Cancer Brother      Diabetes Brother      Heart Defect Brother        SOCIAL HISTORY:  Social History     Socioeconomic History     Marital status:      Spouse name: Not on file     Number of children: Not on file     Years of education: Not on file     Highest education level: Not on file   Occupational History     Not on file   Social Needs     Financial resource strain: Not on file     Food insecurity     Worry: Not on file     Inability: Not on file     Transportation needs     Medical: Not on file     Non-medical: Not on file   Tobacco Use     Smoking status: Former Smoker     Quit date: 10/23/1997     Years since quittin.6     Smokeless tobacco: Never Used    Substance and Sexual Activity     Alcohol use: Yes     Alcohol/week: 0.0 standard drinks     Comment: 13 drinks a week     Drug use: No     Sexual activity: Yes     Partners: Female   Lifestyle     Physical activity     Days per week: Not on file     Minutes per session: Not on file     Stress: Not on file   Relationships     Social connections     Talks on phone: Not on file     Gets together: Not on file     Attends Christian service: Not on file     Active member of club or organization: Not on file     Attends meetings of clubs or organizations: Not on file     Relationship status: Not on file     Intimate partner violence     Fear of current or ex partner: Not on file     Emotionally abused: Not on file     Physically abused: Not on file     Forced sexual activity: Not on file   Other Topics Concern     Not on file   Social History Narrative    , wife Nadira.       Review of Systems:  Skin:        Eyes:       ENT:       Respiratory:       Cardiovascular:       Gastroenterology:      Genitourinary:       Musculoskeletal:       Neurologic:       Psychiatric:       Heme/Lymph/Imm:       Endocrine:           Recent Lab Results:  LIPID RESULTS:  Lab Results   Component Value Date    CHOL 157 01/06/2021    HDL 53 01/06/2021    LDL 56 01/06/2021    TRIG 242 (H) 01/06/2021       LIVER ENZYME RESULTS:  Lab Results   Component Value Date    AST 29 01/06/2021    ALT 26 01/06/2021       CBC RESULTS:  Lab Results   Component Value Date    WBC 7.1 11/13/2019    RBC 3.90 (L) 11/13/2019    HGB 14.4 11/13/2019    HCT 41.3 11/13/2019     (H) 11/13/2019    MCH 36.9 (H) 11/13/2019    MCHC 34.9 11/13/2019    RDW 11.4 11/13/2019     (L) 11/13/2019       BMP RESULTS:  Lab Results   Component Value Date     11/13/2019    POTASSIUM 4.2 01/06/2021    CHLORIDE 103 11/13/2019    CO2 25 11/13/2019    ANIONGAP 7 11/13/2019     (A) 01/06/2021    BUN 17 11/13/2019    CR 0.77 01/06/2021    GFRESTIMATED >60  01/06/2021    GFRESTBLACK >60 01/06/2021    SALOME 9.2 11/13/2019        A1C RESULTS:  Lab Results   Component Value Date    A1C 5.2 11/12/2018       INR RESULTS:  Lab Results   Component Value Date    INR 2.10 (H) 06/15/2021    INR 1.90 (H) 06/02/2021         ECHOCARDIOGRAM  No results found for this or any previous visit (from the past 8760 hour(s)).      No orders of the defined types were placed in this encounter.    No orders of the defined types were placed in this encounter.    There are no discontinued medications.      Encounter Diagnoses   Name Primary?     Pacemaker      Ischemic cardiomyopathy          CC  Janet Alcaraz PA-C  [address]  [place] [address]

## 2021-06-24 LAB
MDC_IDC_EPISODE_DTM: NORMAL
MDC_IDC_EPISODE_DURATION: 0 S
MDC_IDC_EPISODE_DURATION: 1 S
MDC_IDC_EPISODE_DURATION: 10 S
MDC_IDC_EPISODE_DURATION: 145 S
MDC_IDC_EPISODE_DURATION: 15 S
MDC_IDC_EPISODE_DURATION: 16 S
MDC_IDC_EPISODE_DURATION: 17 S
MDC_IDC_EPISODE_DURATION: 17 S
MDC_IDC_EPISODE_DURATION: 198 S
MDC_IDC_EPISODE_DURATION: 2 S
MDC_IDC_EPISODE_DURATION: 24 S
MDC_IDC_EPISODE_DURATION: 270 S
MDC_IDC_EPISODE_DURATION: 29 S
MDC_IDC_EPISODE_DURATION: 36 S
MDC_IDC_EPISODE_DURATION: 37 S
MDC_IDC_EPISODE_DURATION: 38 S
MDC_IDC_EPISODE_DURATION: 41 S
MDC_IDC_EPISODE_DURATION: 41 S
MDC_IDC_EPISODE_DURATION: 43 S
MDC_IDC_EPISODE_DURATION: 43 S
MDC_IDC_EPISODE_DURATION: 47 S
MDC_IDC_EPISODE_DURATION: 48 S
MDC_IDC_EPISODE_DURATION: 5 S
MDC_IDC_EPISODE_DURATION: 55 S
MDC_IDC_EPISODE_DURATION: 58 S
MDC_IDC_EPISODE_DURATION: 59 S
MDC_IDC_EPISODE_DURATION: 6 S
MDC_IDC_EPISODE_DURATION: 60 S
MDC_IDC_EPISODE_DURATION: 86 S
MDC_IDC_EPISODE_DURATION: 93 S
MDC_IDC_EPISODE_DURATION: 93 S
MDC_IDC_EPISODE_ID: 10
MDC_IDC_EPISODE_ID: 11
MDC_IDC_EPISODE_ID: 12
MDC_IDC_EPISODE_ID: 13
MDC_IDC_EPISODE_ID: 14
MDC_IDC_EPISODE_ID: 15
MDC_IDC_EPISODE_ID: 16
MDC_IDC_EPISODE_ID: 17
MDC_IDC_EPISODE_ID: 18
MDC_IDC_EPISODE_ID: 19
MDC_IDC_EPISODE_ID: 20
MDC_IDC_EPISODE_ID: 21
MDC_IDC_EPISODE_ID: 22
MDC_IDC_EPISODE_ID: 23
MDC_IDC_EPISODE_ID: 24
MDC_IDC_EPISODE_ID: 25
MDC_IDC_EPISODE_ID: 26
MDC_IDC_EPISODE_ID: 27
MDC_IDC_EPISODE_ID: 28
MDC_IDC_EPISODE_ID: 29
MDC_IDC_EPISODE_ID: 30
MDC_IDC_EPISODE_ID: 31
MDC_IDC_EPISODE_ID: 32
MDC_IDC_EPISODE_ID: 33
MDC_IDC_EPISODE_ID: 34
MDC_IDC_EPISODE_ID: 35
MDC_IDC_EPISODE_ID: 36
MDC_IDC_EPISODE_ID: 37
MDC_IDC_EPISODE_ID: 38
MDC_IDC_EPISODE_ID: 39
MDC_IDC_EPISODE_ID: 40
MDC_IDC_EPISODE_ID: 41
MDC_IDC_EPISODE_ID: 42
MDC_IDC_EPISODE_ID: 43
MDC_IDC_EPISODE_ID: 6
MDC_IDC_EPISODE_ID: 7
MDC_IDC_EPISODE_ID: 8
MDC_IDC_EPISODE_ID: 9
MDC_IDC_EPISODE_TYPE: NORMAL
MDC_IDC_LEAD_IMPLANT_DT: NORMAL
MDC_IDC_LEAD_LOCATION: NORMAL
MDC_IDC_LEAD_MFG: NORMAL
MDC_IDC_LEAD_MODEL: NORMAL
MDC_IDC_LEAD_SERIAL: NORMAL
MDC_IDC_LEAD_SPECIAL_FUNCTION: NORMAL
MDC_IDC_MSMT_BATTERY_DTM: NORMAL
MDC_IDC_MSMT_BATTERY_REMAINING_LONGEVITY: 72 MO
MDC_IDC_MSMT_BATTERY_RRT_TRIGGER: 2.73
MDC_IDC_MSMT_BATTERY_STATUS: NORMAL
MDC_IDC_MSMT_BATTERY_VOLTAGE: 2.99 V
MDC_IDC_MSMT_CAP_CHARGE_DTM: NORMAL
MDC_IDC_MSMT_CAP_CHARGE_ENERGY: 18 J
MDC_IDC_MSMT_CAP_CHARGE_TIME: 3.74
MDC_IDC_MSMT_CAP_CHARGE_TYPE: NORMAL
MDC_IDC_MSMT_LEADCHNL_RV_IMPEDANCE_VALUE: 342 OHM
MDC_IDC_MSMT_LEADCHNL_RV_IMPEDANCE_VALUE: 342 OHM
MDC_IDC_MSMT_LEADCHNL_RV_PACING_THRESHOLD_AMPLITUDE: 2.5 V
MDC_IDC_MSMT_LEADCHNL_RV_PACING_THRESHOLD_PULSEWIDTH: 0.4 MS
MDC_IDC_MSMT_LEADCHNL_RV_SENSING_INTR_AMPL: 14.12 MV
MDC_IDC_MSMT_LEADCHNL_RV_SENSING_INTR_AMPL: 22.62 MV
MDC_IDC_PG_IMPLANT_DTM: NORMAL
MDC_IDC_PG_MFG: NORMAL
MDC_IDC_PG_MODEL: NORMAL
MDC_IDC_PG_SERIAL: NORMAL
MDC_IDC_PG_TYPE: NORMAL
MDC_IDC_SESS_CLINIC_NAME: NORMAL
MDC_IDC_SESS_DTM: NORMAL
MDC_IDC_SESS_TYPE: NORMAL
MDC_IDC_SET_BRADY_HYSTRATE: NORMAL
MDC_IDC_SET_BRADY_LOWRATE: 40 {BEATS}/MIN
MDC_IDC_SET_BRADY_MODE: NORMAL
MDC_IDC_SET_LEADCHNL_RV_PACING_AMPLITUDE: 5 V
MDC_IDC_SET_LEADCHNL_RV_PACING_ANODE_ELECTRODE_1: NORMAL
MDC_IDC_SET_LEADCHNL_RV_PACING_ANODE_LOCATION_1: NORMAL
MDC_IDC_SET_LEADCHNL_RV_PACING_CAPTURE_MODE: NORMAL
MDC_IDC_SET_LEADCHNL_RV_PACING_CATHODE_ELECTRODE_1: NORMAL
MDC_IDC_SET_LEADCHNL_RV_PACING_CATHODE_LOCATION_1: NORMAL
MDC_IDC_SET_LEADCHNL_RV_PACING_POLARITY: NORMAL
MDC_IDC_SET_LEADCHNL_RV_PACING_PULSEWIDTH: 0.4 MS
MDC_IDC_SET_LEADCHNL_RV_SENSING_ANODE_ELECTRODE_1: NORMAL
MDC_IDC_SET_LEADCHNL_RV_SENSING_ANODE_LOCATION_1: NORMAL
MDC_IDC_SET_LEADCHNL_RV_SENSING_CATHODE_ELECTRODE_1: NORMAL
MDC_IDC_SET_LEADCHNL_RV_SENSING_CATHODE_LOCATION_1: NORMAL
MDC_IDC_SET_LEADCHNL_RV_SENSING_POLARITY: NORMAL
MDC_IDC_SET_LEADCHNL_RV_SENSING_SENSITIVITY: 0.3 MV
MDC_IDC_SET_ZONE_DETECTION_BEATS_DENOMINATOR: 40 {BEATS}
MDC_IDC_SET_ZONE_DETECTION_BEATS_NUMERATOR: 30 {BEATS}
MDC_IDC_SET_ZONE_DETECTION_INTERVAL: 320 MS
MDC_IDC_SET_ZONE_DETECTION_INTERVAL: 360 MS
MDC_IDC_SET_ZONE_DETECTION_INTERVAL: 400 MS
MDC_IDC_SET_ZONE_DETECTION_INTERVAL: NORMAL
MDC_IDC_SET_ZONE_TYPE: NORMAL
MDC_IDC_STAT_BRADY_DTM_END: NORMAL
MDC_IDC_STAT_BRADY_DTM_START: NORMAL
MDC_IDC_STAT_BRADY_RV_PERCENT_PACED: 0.02 %
MDC_IDC_STAT_EPISODE_RECENT_COUNT: 0
MDC_IDC_STAT_EPISODE_RECENT_COUNT: 2
MDC_IDC_STAT_EPISODE_RECENT_COUNT_DTM_END: NORMAL
MDC_IDC_STAT_EPISODE_RECENT_COUNT_DTM_START: NORMAL
MDC_IDC_STAT_EPISODE_TOTAL_COUNT: 0
MDC_IDC_STAT_EPISODE_TOTAL_COUNT: 1
MDC_IDC_STAT_EPISODE_TOTAL_COUNT: 12
MDC_IDC_STAT_EPISODE_TOTAL_COUNT_DTM_END: NORMAL
MDC_IDC_STAT_EPISODE_TOTAL_COUNT_DTM_START: NORMAL
MDC_IDC_STAT_EPISODE_TYPE: NORMAL
MDC_IDC_STAT_TACHYTHERAPY_ATP_DELIVERED_RECENT: 0
MDC_IDC_STAT_TACHYTHERAPY_ATP_DELIVERED_TOTAL: 0
MDC_IDC_STAT_TACHYTHERAPY_RECENT_DTM_END: NORMAL
MDC_IDC_STAT_TACHYTHERAPY_RECENT_DTM_START: NORMAL
MDC_IDC_STAT_TACHYTHERAPY_SHOCKS_ABORTED_RECENT: 0
MDC_IDC_STAT_TACHYTHERAPY_SHOCKS_ABORTED_TOTAL: 0
MDC_IDC_STAT_TACHYTHERAPY_SHOCKS_DELIVERED_RECENT: 0
MDC_IDC_STAT_TACHYTHERAPY_SHOCKS_DELIVERED_TOTAL: 1
MDC_IDC_STAT_TACHYTHERAPY_TOTAL_DTM_END: NORMAL
MDC_IDC_STAT_TACHYTHERAPY_TOTAL_DTM_START: NORMAL

## 2021-06-29 ENCOUNTER — ANTICOAGULATION THERAPY VISIT (OUTPATIENT)
Dept: ANTICOAGULATION | Facility: CLINIC | Age: 71
End: 2021-06-29

## 2021-06-29 DIAGNOSIS — Z79.01 LONG TERM (CURRENT) USE OF ANTICOAGULANTS: ICD-10-CM

## 2021-06-29 DIAGNOSIS — I25.5 ISCHEMIC CARDIOMYOPATHY: ICD-10-CM

## 2021-06-29 LAB
CAPILLARY BLOOD COLLECTION: NORMAL
INR PPP: 2 (ref 0.86–1.14)

## 2021-06-29 PROCEDURE — 36416 COLLJ CAPILLARY BLOOD SPEC: CPT | Performed by: PHYSICIAN ASSISTANT

## 2021-06-29 PROCEDURE — 85610 PROTHROMBIN TIME: CPT | Performed by: PHYSICIAN ASSISTANT

## 2021-06-29 NOTE — PROGRESS NOTES
ANTICOAGULATION MANAGEMENT     Tae Arguelles 70 year old male is on warfarin with therapeutic INR result. (Goal INR 2.0-3.0)    Recent labs: (last 7 days)     06/29/21  1022   INR 2.00*       ASSESSMENT     Source(s): Chart Review and Patient/Caregiver Call       Warfarin doses taken: Warfarin taken as instructed    Diet: plans to decrease greens from 3 times a week to twice weekly    New illness, injury, or hospitalization: No    Medication/supplement changes: None noted    Signs or symptoms of bleeding or clotting: No    Previous INR: Therapeutic last visit; previously outside of goal range    Additional findings: None     PLAN     Recommended plan for no diet, medication or health factor changes affecting INR     Dosing Instructions: Continue your current warfarin dose with next INR in 3 weeks       Summary  As of 6/29/2021    Full warfarin instructions:  5 mg every Mon, Wed, Fri; 7.5 mg all other days   Next INR check:  7/20/2021             Telephone call with Tae whom verbalizes understanding and agrees to plan    Lab visit scheduled    Education provided: Importance of consistent vitamin K intake and Contact [phone]  with any changes, questions or concerns.     Plan made per [facility] anticoagulation protocol    Rochelle Galvez RN  Anticoagulation Clinic  6/29/2021    _______________________________________________________________________     Anticoagulation Episode Summary     Current INR goal:  2.0-3.0   TTR:  74.4 % (1 y)   Target end date:  Indefinite   Send INR reminders to:  [facility]    Indications    Ischemic cardiomyopathy [I25.5]           Comments:           Anticoagulation Care Providers     Provider Role Specialty Phone number    Janet Alcaraz PA-C Referring Internal Medicine [phone]

## 2021-07-12 DIAGNOSIS — I25.5 ISCHEMIC CARDIOMYOPATHY: Primary | ICD-10-CM

## 2021-07-20 ENCOUNTER — LAB (OUTPATIENT)
Dept: LAB | Facility: CLINIC | Age: 71
End: 2021-07-20
Payer: MEDICARE

## 2021-07-20 ENCOUNTER — ANTICOAGULATION THERAPY VISIT (OUTPATIENT)
Dept: ANTICOAGULATION | Facility: CLINIC | Age: 71
End: 2021-07-20

## 2021-07-20 DIAGNOSIS — I25.5 ISCHEMIC CARDIOMYOPATHY: ICD-10-CM

## 2021-07-20 DIAGNOSIS — I25.5 ISCHEMIC CARDIOMYOPATHY: Primary | ICD-10-CM

## 2021-07-20 LAB — INR BLD: 2.7 (ref 0.9–1.1)

## 2021-07-20 PROCEDURE — 36416 COLLJ CAPILLARY BLOOD SPEC: CPT

## 2021-07-20 PROCEDURE — 85610 PROTHROMBIN TIME: CPT

## 2021-07-20 NOTE — PROGRESS NOTES
ANTICOAGULATION MANAGEMENT     Tae Arguelles 70 year old male is on warfarin with therapeutic INR result. (Goal INR 2.0-3.0)    Recent labs: (last 7 days)     07/20/21  1047   INR 2.7*       ASSESSMENT     Source(s): Patient/Caregiver Call       Warfarin doses taken: Warfarin taken as instructed    Diet: No new diet changes identified    New illness, injury, or hospitalization: No    Medication/supplement changes: None noted    Signs or symptoms of bleeding or clotting: No    Previous INR: Therapeutic last 2(+) visits    Additional findings: None     PLAN     Recommended plan for no diet, medication or health factor changes affecting INR     Dosing Instructions: Continue your current warfarin dose with next INR in 4 weeks       Summary  As of 7/20/2021    Full warfarin instructions:  5 mg every Mon, Wed, Fri; 7.5 mg all other days   Next INR check:  8/17/2021             Telephone call with Tae who verbalizes understanding and agrees to plan    Lab visit scheduled    Education provided: Importance of consistent vitamin K intake    Plan made per [facility] anticoagulation protocol    Stefany Elena RN  Anticoagulation Clinic  7/20/2021    _______________________________________________________________________     Anticoagulation Episode Summary     Current INR goal:  2.0-3.0   TTR:  74.4 % (1 y)   Target end date:  Indefinite   Send INR reminders to:  [facility]    Indications    Ischemic cardiomyopathy [I25.5]           Comments:           Anticoagulation Care Providers     Provider Role Specialty Phone number    Janet Alcaraz PA-C Referring Internal Medicine [phone]

## 2021-08-17 ENCOUNTER — ANTICOAGULATION THERAPY VISIT (OUTPATIENT)
Dept: ANTICOAGULATION | Facility: CLINIC | Age: 71
End: 2021-08-17

## 2021-08-17 ENCOUNTER — LAB (OUTPATIENT)
Dept: LAB | Facility: CLINIC | Age: 71
End: 2021-08-17
Payer: MEDICARE

## 2021-08-17 DIAGNOSIS — I25.5 ISCHEMIC CARDIOMYOPATHY: ICD-10-CM

## 2021-08-17 DIAGNOSIS — I25.5 ISCHEMIC CARDIOMYOPATHY: Primary | ICD-10-CM

## 2021-08-17 LAB — INR BLD: 2.5 (ref 0.9–1.1)

## 2021-08-17 PROCEDURE — 85610 PROTHROMBIN TIME: CPT

## 2021-08-17 PROCEDURE — 99207 PR NO CHARGE NURSE ONLY: CPT

## 2021-08-17 PROCEDURE — 36416 COLLJ CAPILLARY BLOOD SPEC: CPT

## 2021-08-17 NOTE — PROGRESS NOTES
ANTICOAGULATION MANAGEMENT     Tae Arguelles 70 year old male is on warfarin with therapeutic INR result. (Goal INR 2.0-3.0)    Recent labs: (last 7 days)     08/17/21  1040   INR 2.5*       ASSESSMENT     Source(s): Chart Review and Patient/Caregiver Call       Warfarin doses taken: Warfarin taken as instructed    Diet: No new diet changes identified    New illness, injury, or hospitalization: No    Medication/supplement changes: None noted    Signs or symptoms of bleeding or clotting: No    Previous INR: Therapeutic last 2(+) visits    Additional findings: None     PLAN     Recommended plan for no diet, medication or health factor changes affecting INR     Dosing Instructions: Continue your current warfarin dose with next INR in 5 weeks       Summary  As of 8/17/2021    Full warfarin instructions:  5 mg every Mon, Wed, Fri; 7.5 mg all other days   Next INR check:  9/21/2021             Telephone call with Tae who verbalizes understanding and agrees to plan    Lab visit scheduled    Education provided: None required    Plan made per [facility] anticoagulation protocol    Rylie Melendez RN  Anticoagulation Clinic  8/17/2021    _______________________________________________________________________     Anticoagulation Episode Summary     Current INR goal:  2.0-3.0   TTR:  74.4 % (1 y)   Target end date:  Indefinite   Send INR reminders to:  [facility]    Indications    Ischemic cardiomyopathy [I25.5]           Comments:           Anticoagulation Care Providers     Provider Role Specialty Phone number    Janet Alcaraz PA-C Referring Internal Medicine [phone]

## 2021-08-17 NOTE — PROGRESS NOTES
ANTICOAGULATION FOLLOW-UP CLINIC VISIT    Patient Name:  Tae Arguelles  Date:  2021  Contact Type:  Telephone    SUBJECTIVE:         OBJECTIVE    Recent labs: (last 7 days)     21  1040   INR 2.5*       ASSESSMENT / PLAN  INR assessment THER    Recheck INR In: 5 WEEKS    INR Location Clinic      Anticoagulation Summary  As of 2021    INR goal:  2.0-3.0   TTR:  74.4 % (1 y)   INR used for dosin.5 (2021)   Warfarin maintenance plan:  5 mg (5 mg x 1) every Mon, Wed, Fri; 7.5 mg (5 mg x 1.5) all other days   Full warfarin instructions:  5 mg every Mon, Wed, Fri; 7.5 mg all other days   Weekly warfarin total:  45 mg   Plan last modified:  Yajaira Bledsoe RN (2021)   Next INR check:  2021   Priority:  Maintenance   Target end date:  Indefinite    Indications    Ischemic cardiomyopathy [I25.5]             Anticoagulation Episode Summary     INR check location:      Preferred lab:      Send INR reminders to:  [facility]    Comments:        Anticoagulation Care Providers     Provider Role Specialty Phone number    Janet Alcaraz PA-C Referring Internal Medicine [phone]            See the Encounter Report to view Anticoagulation Flowsheet and Dosing Calendar (Go to Encounters tab in chart review, and find the Anticoagulation Therapy Visit)        Rylie Melendez RN

## 2021-08-18 ENCOUNTER — TELEPHONE (OUTPATIENT)
Dept: CARDIOLOGY | Facility: CLINIC | Age: 71
End: 2021-08-18

## 2021-08-18 DIAGNOSIS — I25.5 ISCHEMIC CARDIOMYOPATHY: Primary | ICD-10-CM

## 2021-08-18 NOTE — TELEPHONE ENCOUNTER
Pt calling and asking who he should f/u with for general cardiology. He had f/u with Dr Marroquin 6/21 and was told he would be referrred to general cardiology. He also has CM with CHF. Will refer him to Dr Santiago. Placed order for 3 months.

## 2021-09-20 ENCOUNTER — ANCILLARY PROCEDURE (OUTPATIENT)
Dept: CARDIOLOGY | Facility: CLINIC | Age: 71
End: 2021-09-20
Attending: INTERNAL MEDICINE
Payer: MEDICARE

## 2021-09-20 DIAGNOSIS — Z95.810 ICD (IMPLANTABLE CARDIOVERTER-DEFIBRILLATOR) IN PLACE: ICD-10-CM

## 2021-09-20 PROCEDURE — 93296 REM INTERROG EVL PM/IDS: CPT | Performed by: INTERNAL MEDICINE

## 2021-09-20 PROCEDURE — 93295 DEV INTERROG REMOTE 1/2/MLT: CPT | Performed by: INTERNAL MEDICINE

## 2021-09-21 ENCOUNTER — ANTICOAGULATION THERAPY VISIT (OUTPATIENT)
Dept: ANTICOAGULATION | Facility: CLINIC | Age: 71
End: 2021-09-21

## 2021-09-21 ENCOUNTER — LAB (OUTPATIENT)
Dept: LAB | Facility: CLINIC | Age: 71
End: 2021-09-21
Payer: MEDICARE

## 2021-09-21 DIAGNOSIS — I25.5 ISCHEMIC CARDIOMYOPATHY: ICD-10-CM

## 2021-09-21 DIAGNOSIS — I25.5 ISCHEMIC CARDIOMYOPATHY: Primary | ICD-10-CM

## 2021-09-21 LAB — INR BLD: 2.4 (ref 0.9–1.1)

## 2021-09-21 PROCEDURE — 85610 PROTHROMBIN TIME: CPT

## 2021-09-21 PROCEDURE — 36416 COLLJ CAPILLARY BLOOD SPEC: CPT

## 2021-09-21 RX ORDER — WARFARIN SODIUM 5 MG/1
TABLET ORAL
Qty: 150 TABLET | Refills: 0 | Status: SHIPPED | OUTPATIENT
Start: 2021-09-21 | End: 2021-12-08

## 2021-09-21 NOTE — PROGRESS NOTES
ANTICOAGULATION MANAGEMENT     Tae Arguelles 70 year old male is on warfarin with therapeutic INR result. (Goal INR 2.0-3.0)    Recent labs: (last 7 days)     09/21/21  1013   INR 2.4*       ASSESSMENT     Source(s): Chart Review and Patient/Caregiver Call       Warfarin doses taken: Warfarin taken as instructed    Diet: No new diet changes identified    New illness, injury, or hospitalization: No    Medication/supplement changes: None noted    Signs or symptoms of bleeding or clotting: No    Previous INR: Therapeutic last 2(+) visits    Additional findings: None     PLAN     Recommended plan for no diet, medication or health factor changes affecting INR     Dosing Instructions: Continue your current warfarin dose with next INR in 6 weeks       Summary  As of 9/21/2021    Full warfarin instructions:  5 mg every Mon, Wed, Fri; 7.5 mg all other days   Next INR check:  11/2/2021             Telephone call with Tae who agrees to plan and repeated back plan correctly    Lab visit scheduled    Education provided: Please call back if any changes to your diet, medications or how you've been taking warfarin    Plan made per [facility] anticoagulation protocol    Neela Rowley RN  Anticoagulation Clinic  9/21/2021    _______________________________________________________________________     Anticoagulation Episode Summary     Current INR goal:  2.0-3.0   TTR:  74.4 % (1 y)   Target end date:  Indefinite   Send INR reminders to:  [facility]    Indications    Ischemic cardiomyopathy [I25.5]           Comments:           Anticoagulation Care Providers     Provider Role Specialty Phone number    Janet Alcaraz PA-C Referring Internal Medicine [phone]

## 2021-10-04 LAB
MDC_IDC_LEAD_IMPLANT_DT: NORMAL
MDC_IDC_LEAD_LOCATION: NORMAL
MDC_IDC_LEAD_MFG: NORMAL
MDC_IDC_LEAD_MODEL: NORMAL
MDC_IDC_LEAD_SERIAL: NORMAL
MDC_IDC_LEAD_SPECIAL_FUNCTION: NORMAL
MDC_IDC_MSMT_BATTERY_DTM: NORMAL
MDC_IDC_MSMT_BATTERY_REMAINING_LONGEVITY: 67 MO
MDC_IDC_MSMT_BATTERY_RRT_TRIGGER: 2.73
MDC_IDC_MSMT_BATTERY_STATUS: NORMAL
MDC_IDC_MSMT_BATTERY_VOLTAGE: 2.98 V
MDC_IDC_MSMT_CAP_CHARGE_DTM: NORMAL
MDC_IDC_MSMT_CAP_CHARGE_ENERGY: 18 J
MDC_IDC_MSMT_CAP_CHARGE_TIME: 3.77
MDC_IDC_MSMT_CAP_CHARGE_TYPE: NORMAL
MDC_IDC_MSMT_LEADCHNL_RV_IMPEDANCE_VALUE: 323 OHM
MDC_IDC_MSMT_LEADCHNL_RV_IMPEDANCE_VALUE: 342 OHM
MDC_IDC_MSMT_LEADCHNL_RV_PACING_THRESHOLD_AMPLITUDE: 2.5 V
MDC_IDC_MSMT_LEADCHNL_RV_PACING_THRESHOLD_PULSEWIDTH: 0.4 MS
MDC_IDC_MSMT_LEADCHNL_RV_SENSING_INTR_AMPL: 15.62 MV
MDC_IDC_MSMT_LEADCHNL_RV_SENSING_INTR_AMPL: 15.62 MV
MDC_IDC_PG_IMPLANT_DTM: NORMAL
MDC_IDC_PG_MFG: NORMAL
MDC_IDC_PG_MODEL: NORMAL
MDC_IDC_PG_SERIAL: NORMAL
MDC_IDC_PG_TYPE: NORMAL
MDC_IDC_SESS_CLINIC_NAME: NORMAL
MDC_IDC_SESS_DTM: NORMAL
MDC_IDC_SESS_TYPE: NORMAL
MDC_IDC_SET_BRADY_HYSTRATE: NORMAL
MDC_IDC_SET_BRADY_LOWRATE: 40 {BEATS}/MIN
MDC_IDC_SET_BRADY_MODE: NORMAL
MDC_IDC_SET_LEADCHNL_RV_PACING_AMPLITUDE: 5 V
MDC_IDC_SET_LEADCHNL_RV_PACING_ANODE_ELECTRODE_1: NORMAL
MDC_IDC_SET_LEADCHNL_RV_PACING_ANODE_LOCATION_1: NORMAL
MDC_IDC_SET_LEADCHNL_RV_PACING_CAPTURE_MODE: NORMAL
MDC_IDC_SET_LEADCHNL_RV_PACING_CATHODE_ELECTRODE_1: NORMAL
MDC_IDC_SET_LEADCHNL_RV_PACING_CATHODE_LOCATION_1: NORMAL
MDC_IDC_SET_LEADCHNL_RV_PACING_POLARITY: NORMAL
MDC_IDC_SET_LEADCHNL_RV_PACING_PULSEWIDTH: 0.4 MS
MDC_IDC_SET_LEADCHNL_RV_SENSING_ANODE_ELECTRODE_1: NORMAL
MDC_IDC_SET_LEADCHNL_RV_SENSING_ANODE_LOCATION_1: NORMAL
MDC_IDC_SET_LEADCHNL_RV_SENSING_CATHODE_ELECTRODE_1: NORMAL
MDC_IDC_SET_LEADCHNL_RV_SENSING_CATHODE_LOCATION_1: NORMAL
MDC_IDC_SET_LEADCHNL_RV_SENSING_POLARITY: NORMAL
MDC_IDC_SET_LEADCHNL_RV_SENSING_SENSITIVITY: 0.3 MV
MDC_IDC_SET_ZONE_DETECTION_BEATS_DENOMINATOR: 40 {BEATS}
MDC_IDC_SET_ZONE_DETECTION_BEATS_NUMERATOR: 30 {BEATS}
MDC_IDC_SET_ZONE_DETECTION_INTERVAL: 320 MS
MDC_IDC_SET_ZONE_DETECTION_INTERVAL: 360 MS
MDC_IDC_SET_ZONE_DETECTION_INTERVAL: 400 MS
MDC_IDC_SET_ZONE_DETECTION_INTERVAL: NORMAL
MDC_IDC_SET_ZONE_TYPE: NORMAL
MDC_IDC_STAT_BRADY_DTM_END: NORMAL
MDC_IDC_STAT_BRADY_DTM_START: NORMAL
MDC_IDC_STAT_BRADY_RV_PERCENT_PACED: 0 %
MDC_IDC_STAT_EPISODE_RECENT_COUNT: 0
MDC_IDC_STAT_EPISODE_RECENT_COUNT_DTM_END: NORMAL
MDC_IDC_STAT_EPISODE_RECENT_COUNT_DTM_START: NORMAL
MDC_IDC_STAT_EPISODE_TOTAL_COUNT: 0
MDC_IDC_STAT_EPISODE_TOTAL_COUNT: 1
MDC_IDC_STAT_EPISODE_TOTAL_COUNT: 13
MDC_IDC_STAT_EPISODE_TOTAL_COUNT_DTM_END: NORMAL
MDC_IDC_STAT_EPISODE_TOTAL_COUNT_DTM_START: NORMAL
MDC_IDC_STAT_EPISODE_TYPE: NORMAL
MDC_IDC_STAT_TACHYTHERAPY_ATP_DELIVERED_RECENT: 0
MDC_IDC_STAT_TACHYTHERAPY_ATP_DELIVERED_TOTAL: 0
MDC_IDC_STAT_TACHYTHERAPY_RECENT_DTM_END: NORMAL
MDC_IDC_STAT_TACHYTHERAPY_RECENT_DTM_START: NORMAL
MDC_IDC_STAT_TACHYTHERAPY_SHOCKS_ABORTED_RECENT: 0
MDC_IDC_STAT_TACHYTHERAPY_SHOCKS_ABORTED_TOTAL: 0
MDC_IDC_STAT_TACHYTHERAPY_SHOCKS_DELIVERED_RECENT: 0
MDC_IDC_STAT_TACHYTHERAPY_SHOCKS_DELIVERED_TOTAL: 1
MDC_IDC_STAT_TACHYTHERAPY_TOTAL_DTM_END: NORMAL
MDC_IDC_STAT_TACHYTHERAPY_TOTAL_DTM_START: NORMAL

## 2021-11-02 ENCOUNTER — DOCUMENTATION ONLY (OUTPATIENT)
Dept: FAMILY MEDICINE | Facility: CLINIC | Age: 71
End: 2021-11-02

## 2021-11-02 ENCOUNTER — ANTICOAGULATION THERAPY VISIT (OUTPATIENT)
Dept: ANTICOAGULATION | Facility: CLINIC | Age: 71
End: 2021-11-02

## 2021-11-02 ENCOUNTER — LAB (OUTPATIENT)
Dept: LAB | Facility: CLINIC | Age: 71
End: 2021-11-02
Payer: MEDICARE

## 2021-11-02 DIAGNOSIS — I25.5 ISCHEMIC CARDIOMYOPATHY: Primary | ICD-10-CM

## 2021-11-02 DIAGNOSIS — I25.5 ISCHEMIC CARDIOMYOPATHY: ICD-10-CM

## 2021-11-02 LAB — INR BLD: 3.2 (ref 0.9–1.1)

## 2021-11-02 PROCEDURE — 85610 PROTHROMBIN TIME: CPT

## 2021-11-02 PROCEDURE — 36416 COLLJ CAPILLARY BLOOD SPEC: CPT

## 2021-11-02 NOTE — PROGRESS NOTES
ANTICOAGULATION MANAGEMENT      Tae Arguelles due for annual renewal of referral to anticoagulation monitoring. Order pended for your review and signature.      ANTICOAGULATION SUMMARY      Warfarin indication(s)     ischemic cardiomyopathy     Heart valve present?  NO       Current goal range   INR: 2.0-3.0     Goal appropriate for indication? Yes, INR 2-3 appropriate for hx of DVT, PE, hypercoagulable state, Afib, LVAD, or bileaflet AVR without risk factors     Current duration of therapy Indefinite/long term therapy   Time in Therapeutic Range (TTR)  (Goal > 60%) 79.5%       Office visit with referring provider's group within last year yes on 6/8/21       Stefany Elena RN

## 2021-11-02 NOTE — PROGRESS NOTES
ANTICOAGULATION MANAGEMENT     Tae ISAAC Denisedouardgianamelida 71 year old male is on warfarin with supratherapeutic INR result. (Goal INR 2.0-3.0)    Recent labs: (last 7 days)     11/02/21  1018   INR 3.2*       ASSESSMENT     Source(s): Chart Review and Patient/Caregiver Call       Warfarin doses taken: Warfarin taken as instructed    Diet: No new diet changes identified    New illness, injury, or hospitalization: Yes: back pain and knee pain during October, was taking Tylenol, was walking less, but symptoms have now resolved is walking more and feeling better     Medication/supplement changes: None noted    Signs or symptoms of bleeding or clotting: No    Previous INR: Therapeutic last 2(+) visits    Additional findings: None     PLAN     Recommended plan for temporary change(s) affecting INR     Dosing Instructions: Partial hold then continue your current warfarin dose with next INR in 2 weeks       Summary  As of 11/2/2021    Full warfarin instructions:  5 mg every Mon, Wed, Fri; 7.5 mg all other days   Next INR check:  11/16/2021             Telephone call with Tae who verbalizes understanding and agrees to plan    Lab visit scheduled    Education provided: Goal range and significance of current result, Monitoring for bleeding signs and symptoms and Monitoring for clotting signs and symptoms    Plan made per [facility] anticoagulation protocol    Stefany Elena RN  Anticoagulation Clinic  11/2/2021    _______________________________________________________________________     Anticoagulation Episode Summary     Current INR goal:  2.0-3.0   TTR:  79.5 % (1 y)   Target end date:  Indefinite   Send INR reminders to:  [facility]    Indications    Ischemic cardiomyopathy [I25.5]           Comments:           Anticoagulation Care Providers     Provider Role Specialty Phone number    Janet Alcaraz PA-C Referring Internal Medicine [phone]

## 2021-11-10 ENCOUNTER — CARE COORDINATION (OUTPATIENT)
Dept: CARDIOLOGY | Facility: CLINIC | Age: 71
End: 2021-11-10

## 2021-11-10 ENCOUNTER — OFFICE VISIT (OUTPATIENT)
Dept: CARDIOLOGY | Facility: CLINIC | Age: 71
End: 2021-11-10
Payer: MEDICARE

## 2021-11-10 VITALS
DIASTOLIC BLOOD PRESSURE: 67 MMHG | HEART RATE: 81 BPM | BODY MASS INDEX: 28.25 KG/M2 | WEIGHT: 180 LBS | SYSTOLIC BLOOD PRESSURE: 134 MMHG | HEIGHT: 67 IN

## 2021-11-10 DIAGNOSIS — I25.5 ISCHEMIC CARDIOMYOPATHY: ICD-10-CM

## 2021-11-10 PROCEDURE — 99204 OFFICE O/P NEW MOD 45 MIN: CPT | Performed by: INTERNAL MEDICINE

## 2021-11-10 ASSESSMENT — MIFFLIN-ST. JEOR: SCORE: 1520.1

## 2021-11-10 NOTE — PROGRESS NOTES
"Call out to [facility] Phone: [phone] for patient history and indication for warfarin therapy. Tae was a long time patient of Dr. Alva and Marietta Benedict PA-C. Spoke with NIKI Lugo at [facility]. She extensively reviewed their old records and reviewed the following over the phone:    - 10/23/1997 OR report - angiogram and angioplasty unsuccessful. The patient suffered an extensive large MI. Patient had a rickey recovery and went on to have EP study showing NSVT by Dr. Sosa at [facility] and ICD placement. NO med list attached to OR report as it is an old scanned in document.   - 11/5/1997 - [facility] discharge note - \"Patient was also started on ACE-I and felt coumadin would be in his best interest for approximately 6 months.\"  - 2004 - New patient consult with Dr. Alva. She confirmed coumadin is on med list at [facility] with indication of cardiomyopathy.     Brittney is to fax over these records she was able to locate. Will review records and see if we need to look into old [facility] and [place] heart records that might be stored somewhere. Sheyla Lock RN on 11/10/2021 at 3:31 PM        Jono Santiago MD  P Curiel [facility] Heart Team 7; Sandoval Marroquin MD Luci     Any word on why he is on anticoagulation?     Team - he told me that he was on it since his MI in 1997. Can we get 1997 discharge summary from [facility]? Thanks, K     "

## 2021-11-10 NOTE — PROGRESS NOTES
Service Date: 11/10/2021    REASON FOR VISIT:  Heart failure with reduced EF.  New heart failure consultation, referral from  clinic.    HISTORY OF PRESENT ILLNESS:  Tae is a very delightful 71-year-old gentleman, who is transferring care from [facility] where he used to follow with Dr. Brendan Alva, and after his prison, he established care with us at [facility] in [place].  He recently saw Dr. Marroquin in 06/2021 and now he is seeing me in heart failure consultation.  He has the following set of complex medical issues:  1.  History of a large anterior MI in 1997 with a failed attempted PCI and subsequent emergent bypass surgery, unclear details, but in 1997.  2.  Associated ischemic cardiomyopathy with an EF of 30%.  3.  Secondary ICD implanted during that admission, subsequent gen change in 2015.  4.  Chronic anticoagulation for unclear indications at this time.  The patient says he was on it after his discharge.  We are still trying to gather information to see why he is on it.  No reported history of DVT, PE, AFib or LV thrombus.  5.  Chronic ischemic cardiomyopathy with an EF of 30%.  6.  Hypertension.  7.  Hyperlipidemia.  8.  Prior history of smoking.  9.  History of ICD shock in 2018 with an echocardiogram at that time showing EF of 32% and a PET stress showing no ischemia, but a relatively large anterior infarct.    Tae is here for following up today and establishing heart failure care with me.  I have never met him before.  He is a delightful 71-year-old.  He is functionally quite active, NYHA class II.  No new cardiopulmonary symptoms reported at this time.  He says he has been chronically maintained on metoprolol tartrate 50 b.i.d., lisinopril 10 mg daily and Lasix 40 mg daily.  He says he has been on these drugs for almost 2 decades without any issues in that regard.  Denies bleeding problems.  No syncope, presyncope, no palpitations, no angina, no orthopnea,  PND.    PHYSICAL EXAMINATION:    GENERAL:  Alert, oriented x3, in no acute distress.  VITAL SIGNS:  Blood pressure 134/67 with a pulse of 81 and regular, BMI of 28.  NECK:  Supple.  JVP normal.  LUNGS:  Clear.  HEART:  Cardiac sounds are regular.  There is a soft systolic murmur radiating to the axilla, likely from mitral regurgitation.  No edema.  NECK:  JVP is normal.  Extremities are warm without edema.  PSYCHIATRIC:  Appropriate affect.  HEENT:  No icterus, pallor.    ASSESSMENT AND PLAN:  Tae is a pleasant 71-year-old with a history of severe ischemic cardiomyopathy and bypass surgery back in 1997.  We need to get details on his coronary anatomy and discharge summary from 1997 to see why he was put on anticoagulation.    I had a long discussion with Tae about his heart failure medications.  He has done well for almost 2 decades, but I did go over data behind using Entresto over ACE inhibitors and addition of spironolactone to his regimen and, of course, addition of SGLT2 inhibitors as well.    At this point, given the clinical stability, he wants to hold off on making any further changes at least for the time being, but we will start off getting an echocardiogram to see what his EF is.  If there is evidence of EF decline (previously was 32%), in that case, I would first recommend switching him from metoprolol tartrate to succinate and changing lisinopril to Entresto and then slowly adding spironolactone and SGLT2 inhibitors with weaning diuretic regimen at that point.    I will have him follow up with an [facility] in a month from now.  Hopefully, by that time, we have some more data on his anticoagulation indications.  I have messaged out to Dr. Marroquin to see if he has heard anything else about it and also sent a message out to my nursing team to look into this further.  Otherwise, I will see him on a yearly basis.  Tae appears clinically stable.  He will continue to follow  up with his EP clinic for device care and his history of ICD shock in the past.    cc:  Janet Alcaraz PA-C  [facility]  [address] [id number]    Sandoval Marroquin MD  [facility]  [address], [address]  [place] [address]    Jono Santiago MD        D: 11/10/2021   T: 11/10/2021   MT: al    Name:     SHANA TREJO  MRN:      [id number]        Account:      [id number]   :      1950           Service Date: 11/10/2021       Document: [id number]

## 2021-11-10 NOTE — LETTER
11/10/2021       RE: Tae Arguelles  [address]  [place] [facility] [address]     Dear Colleague,    Thank you for referring your patient, Tae Arguelles, to the [facility] at [facility]. Please see a copy of my visit note below.    HPI and Plan:   See dictation [id number]    Total 45 minutes spent reviewing outside records, discussing with patient and documentation for complex HFREF and VT    Orders Placed This Encounter   Procedures     Follow-Up with [facility] - SHARIF visit     Follow-Up with [facility] - Return MD visit     No orders of the defined types were placed in this encounter.    There are no discontinued medications.      Encounter Diagnosis   Name Primary?     Ischemic cardiomyopathy        CURRENT MEDICATIONS:  Current Outpatient Medications   Medication Sig Dispense Refill     atorvastatin (LIPITOR) 20 MG tablet Take 20 mg by mouth       Cholecalciferol (VITAMIN D) 1000 UNITS capsule Take 1 capsule by mouth daily.       fluocinolone (SYNALAR) 0.01 % solution PRN 60 mL 1     FUROSEMIDE 40 MG OR TABS 1 TABLET DAILY       hydrocortisone (WESTCORT) 0.2 % external cream as needed 45 g 1     LISINOPRIL 10 MG OR TABS 1 TABLET DAILY       metoprolol (LOPRESSOR) 50 MG tablet Take 1 tablet by mouth 2 times daily. 180 tablet 3     multivitamin, therapeutic with minerals (MULTI-VITAMIN) TABS Take 1 tablet by mouth daily.       NIACIN 750 MG OR TBCR 2 tabs qhs       nitroglycerin (NITROSTAT) 0.4 MG SL tablet Place  under the tongue every 5 minutes as needed.       warfarin ANTICOAGULANT (COUMADIN) 5 MG tablet Take 1 tablet (5 mg) on Mondays/Wednesdays/Fridays and take 1.5 tablets (7.5 mg) all other days or as directed by the INR clinic 150 tablet 0       ALLERGIES     Allergies   Allergen Reactions     Septra [Sulfa Drugs]        PAST MEDICAL HISTORY:  Past Medical History:   Diagnosis Date     BPH (benign prostatic hyperplasia)      Dr. Whitfield, PSA 0.27 2012     CAD (coronary artery disease)     MI      CHF (congestive heart failure) (H)      Colon polyp      History of smoking     Quit      HTN (hypertension), benign      Hyperlipidemia LDL goal <100      Ischemic cardiomyopathy     Dr. Alva     Lichen planus      Normal colonoscopy 1-3-08    Repeat 5-10 years     Pacemaker ,     ventricular tachycardia     Pleural effusion      Pneumonia      Pneumothorax     plus rib fx secondary to fall (L)     Seborrheic dermatitis      Toe fracture, right        PAST SURGICAL HISTORY:  Past Surgical History:   Procedure Laterality Date     C CABG, ARTERIAL, TWO       COLONOSCOPY N/A 2015    Procedure: COLONOSCOPY;  Surgeon: Zac Austin MD;  Location:  GI     MOUTH SURGERY      Upper and lower dentures       FAMILY HISTORY:  Family History   Problem Relation Age of Onset     Diabetes Father      Cancer Father 73        lung and bone ca     Diabetes Sister      Kidney Cancer Sister 77     Breast Cancer Sister      Uterine Cancer Sister      Hypertension Mother      Cerebrovascular Disease Mother          age 75     Thyroid Disease Mother      Kidney Cancer Brother      Diabetes Brother      Heart Defect Brother        SOCIAL HISTORY:  Social History     Socioeconomic History     Marital status:      Spouse name: Not on file     Number of children: Not on file     Years of education: Not on file     Highest education level: Not on file   Occupational History     Not on file   Tobacco Use     Smoking status: Former Smoker     Quit date: 10/23/1997     Years since quittin.0     Smokeless tobacco: Never Used   Substance and Sexual Activity     Alcohol use: Yes     Alcohol/week: 0.0 standard drinks     Comment: 7 beers a week     Drug use: No     Sexual activity: Yes     Partners: Female   Other Topics Concern     Parent/sibling w/ CABG, MI or angioplasty before 65F 55M? Not Asked   Social  "History Narrative    , wife Nadira.     Social Determinants of Health     Financial Resource Strain: Not on file   Food Insecurity: Not on file   Transportation Needs: Not on file   Physical Activity: Not on file   Stress: Not on file   Social Connections: Not on file   Intimate Partner Violence: Not on file   Housing Stability: Not on file       Review of Systems:  Skin:  Negative     Eyes:  Negative    ENT:  Negative    Respiratory:  Negative    Cardiovascular:  Negative    Gastroenterology: Negative    Genitourinary:  Negative    Musculoskeletal:  Negative    Neurologic:  Negative    Psychiatric:  Negative    Heme/Lymph/Imm:  Negative    Endocrine:  Negative      Physical Exam:  Vitals: /67   Pulse 81   Ht 1.702 m (5' 7\")   Wt 81.6 kg (180 lb)   BMI 28.19 kg/m      Constitutional:           Skin:             Head:           Eyes:           Lymph:      ENT:           Neck:           Respiratory:            Cardiac:                                                           GI:           Extremities and Muscular Skeletal:                 Neurological:           Psych:           Recent Lab Results:  LIPID RESULTS:  Lab Results   Component Value Date    CHOL 157 01/06/2021    HDL 53 01/06/2021    LDL 56 01/06/2021    TRIG 242 (H) 01/06/2021       LIVER ENZYME RESULTS:  Lab Results   Component Value Date    AST 29 01/06/2021    ALT 26 01/06/2021       CBC RESULTS:  Lab Results   Component Value Date    WBC 7.1 11/13/2019    RBC 3.90 (L) 11/13/2019    HGB 14.4 11/13/2019    HCT 41.3 11/13/2019     (H) 11/13/2019    MCH 36.9 (H) 11/13/2019    MCHC 34.9 11/13/2019    RDW 11.4 11/13/2019     (L) 11/13/2019       BMP RESULTS:  Lab Results   Component Value Date     11/13/2019    POTASSIUM 4.2 01/06/2021    CHLORIDE 103 11/13/2019    CO2 25 11/13/2019    ANIONGAP 7 11/13/2019     (A) 01/06/2021    BUN 17 11/13/2019    CR 0.77 01/06/2021    GFRESTIMATED >60 01/06/2021    GFRESTBLACK " >60 01/06/2021    SALOME 9.2 11/13/2019        A1C RESULTS:  Lab Results   Component Value Date    A1C 5.2 11/12/2018       INR RESULTS:  Lab Results   Component Value Date    INR 3.2 (H) 11/02/2021    INR 2.4 (H) 09/21/2021    INR 2.00 (H) 06/29/2021    INR 2.10 (H) 06/15/2021           CC  No referring provider defined for this encounter.                    Service Date: 11/10/2021    REASON FOR VISIT:  Heart failure with reduced EF.  New heart failure consultation, referral from  clinic.    HISTORY OF PRESENT ILLNESS:  Tae is a very delightful 71-year-old gentleman, who is transferring care from [facility] where he used to follow with Dr. Brendan Alva, and after his [facility]PC, he established care with us at [facility] in [place].  He recently saw Dr. Marroquin in 06/2021 and now he is seeing me in heart failure consultation.  He has the following set of complex medical issues:  1.  History of a large anterior MI in 1997 with a failed attempted PCI and subsequent emergent bypass surgery, unclear details, but in 1997.  2.  Associated ischemic cardiomyopathy with an EF of 30%.  3.  Secondary ICD implanted during that admission, subsequent gen change in 2015.  4.  Chronic anticoagulation for unclear indications at this time.  The patient says he was on it after his discharge.  We are still trying to gather information to see why he is on it.  No reported history of DVT, PE, AFib or LV thrombus.  5.  Chronic ischemic cardiomyopathy with an EF of 30%.  6.  Hypertension.  7.  Hyperlipidemia.  8.  Prior history of smoking.  9.  History of ICD shock in 2018 with an echocardiogram at that time showing EF of 32% and a PET stress showing no ischemia, but a relatively large anterior infarct.    Tae is here for following up today and establishing heart failure care with me.  I have never met him before.  He is a delightful 71-year-old.  He is functionally quite active, NYHA class II.  No new  cardiopulmonary symptoms reported at this time.  He says he has been chronically maintained on metoprolol tartrate 50 b.i.d., lisinopril 10 mg daily and Lasix 40 mg daily.  He says he has been on these drugs for almost 2 decades without any issues in that regard.  Denies bleeding problems.  No syncope, presyncope, no palpitations, no angina, no orthopnea, PND.    PHYSICAL EXAMINATION:    GENERAL:  Alert, oriented x3, in no acute distress.  VITAL SIGNS:  Blood pressure 134/67 with a pulse of 81 and regular, BMI of 28.  NECK:  Supple.  JVP normal.  LUNGS:  Clear.  HEART:  Cardiac sounds are regular.  There is a soft systolic murmur radiating to the axilla, likely from mitral regurgitation.  No edema.  NECK:  JVP is normal.  Extremities are warm without edema.  PSYCHIATRIC:  Appropriate affect.  HEENT:  No icterus, pallor.    ASSESSMENT AND PLAN:  Tae is a pleasant 71-year-old with a history of severe ischemic cardiomyopathy and bypass surgery back in 1997.  We need to get details on his coronary anatomy and discharge summary from 1997 to see why he was put on anticoagulation.    I had a long discussion with Tae about his heart failure medications.  He has done well for almost 2 decades, but I did go over data behind using Entresto over ACE inhibitors and addition of spironolactone to his regimen and, of course, addition of SGLT2 inhibitors as well.    At this point, given the clinical stability, he wants to hold off on making any further changes at least for the time being, but we will start off getting an echocardiogram to see what his EF is.  If there is evidence of EF decline (previously was 32%), in that case, I would first recommend switching him from metoprolol tartrate to succinate and changing lisinopril to Entresto and then slowly adding spironolactone and SGLT2 inhibitors with weaning diuretic regimen at that point.    I will have him follow up with an echocardiogram [facility] in a month  from now.  Hopefully, by that time, we have some more data on his anticoagulation indications.  I have messaged out to Dr. Marroquin to see if he has heard anything else about it and also sent a message out to my nursing team to look into this further.  Otherwise, I will see him on a yearly basis.  Tae appears clinically stable.  He will continue to follow up with his EP clinic for device care and his history of ICD shock in the past.      cc:  Janet Alcaraz PA-C  [facility]  [address] [id number]    Sandoval Marroquin MD  [facility] [place]  [address], [address]  [place] [address]      Jono Santiago MD        D: 11/10/2021   T: 11/10/2021   MT: al    Name:     TAE TREJO  MRN:      [id number]        Account:      [id number]   :      1950           Service Date: 11/10/2021     Document: [id number]

## 2021-11-10 NOTE — PROGRESS NOTES
HPI and Plan:   See dictation [id number]    Total 45 minutes spent reviewing outside records, discussing with patient and documentation for complex HFREF and VT    Orders Placed This Encounter   Procedures     Follow-Up with [facility] - SHARIF visit     Follow-Up with [facility] - Return MD visit     No orders of the defined types were placed in this encounter.    There are no discontinued medications.      Encounter Diagnosis   Name Primary?     Ischemic cardiomyopathy        CURRENT MEDICATIONS:  Current Outpatient Medications   Medication Sig Dispense Refill     atorvastatin (LIPITOR) 20 MG tablet Take 20 mg by mouth       Cholecalciferol (VITAMIN D) 1000 UNITS capsule Take 1 capsule by mouth daily.       fluocinolone (SYNALAR) 0.01 % solution PRN 60 mL 1     FUROSEMIDE 40 MG OR TABS 1 TABLET DAILY       hydrocortisone (WESTCORT) 0.2 % external cream as needed 45 g 1     LISINOPRIL 10 MG OR TABS 1 TABLET DAILY       metoprolol (LOPRESSOR) 50 MG tablet Take 1 tablet by mouth 2 times daily. 180 tablet 3     multivitamin, therapeutic with minerals (MULTI-VITAMIN) TABS Take 1 tablet by mouth daily.       NIACIN 750 MG OR TBCR 2 tabs qhs       nitroglycerin (NITROSTAT) 0.4 MG SL tablet Place  under the tongue every 5 minutes as needed.       warfarin ANTICOAGULANT (COUMADIN) 5 MG tablet Take 1 tablet (5 mg) on Mondays/Wednesdays/Fridays and take 1.5 tablets (7.5 mg) all other days or as directed by the INR clinic 150 tablet 0       ALLERGIES     Allergies   Allergen Reactions     Septra [Sulfa Drugs]        PAST MEDICAL HISTORY:  Past Medical History:   Diagnosis Date     BPH (benign prostatic hyperplasia)     Dr. Whitfield, PSA 0.27 12/2012     CAD (coronary artery disease) 1997    MI      CHF (congestive heart failure) (H)      Colon polyp      History of smoking     Quit 1997     HTN (hypertension), benign      Hyperlipidemia LDL goal <100      Ischemic cardiomyopathy     Dr. Alva     Lichen planus      Normal  colonoscopy 1-3-08    Repeat 5-10 years     Pacemaker ,     ventricular tachycardia     Pleural effusion      Pneumonia      Pneumothorax     plus rib fx secondary to fall (L)     Seborrheic dermatitis      Toe fracture, right        PAST SURGICAL HISTORY:  Past Surgical History:   Procedure Laterality Date     C CABG, ARTERIAL, TWO       COLONOSCOPY N/A 2015    Procedure: COLONOSCOPY;  Surgeon: Zac Austin MD;  Location:  GI     MOUTH SURGERY      Upper and lower dentures       FAMILY HISTORY:  Family History   Problem Relation Age of Onset     Diabetes Father      Cancer Father 73        lung and bone ca     Diabetes Sister      Kidney Cancer Sister 77     Breast Cancer Sister      Uterine Cancer Sister      Hypertension Mother      Cerebrovascular Disease Mother          age 75     Thyroid Disease Mother      Kidney Cancer Brother      Diabetes Brother      Heart Defect Brother        SOCIAL HISTORY:  Social History     Socioeconomic History     Marital status:      Spouse name: Not on file     Number of children: Not on file     Years of education: Not on file     Highest education level: Not on file   Occupational History     Not on file   Tobacco Use     Smoking status: Former Smoker     Quit date: 10/23/1997     Years since quittin.0     Smokeless tobacco: Never Used   Substance and Sexual Activity     Alcohol use: Yes     Alcohol/week: 0.0 standard drinks     Comment: 7 beers a week     Drug use: No     Sexual activity: Yes     Partners: Female   Other Topics Concern     Parent/sibling w/ CABG, MI or angioplasty before 65F 55M? Not Asked   Social History Narrative    , wife Nadira.     Social Determinants of Health     Financial Resource Strain: Not on file   Food Insecurity: Not on file   Transportation Needs: Not on file   Physical Activity: Not on file   Stress: Not on file   Social Connections: Not on file   Intimate Partner Violence: Not on  "file   Housing Stability: Not on file       Review of Systems:  Skin:  Negative     Eyes:  Negative    ENT:  Negative    Respiratory:  Negative    Cardiovascular:  Negative    Gastroenterology: Negative    Genitourinary:  Negative    Musculoskeletal:  Negative    Neurologic:  Negative    Psychiatric:  Negative    Heme/Lymph/Imm:  Negative    Endocrine:  Negative      Physical Exam:  Vitals: /67   Pulse 81   Ht 1.702 m (5' 7\")   Wt 81.6 kg (180 lb)   BMI 28.19 kg/m      Constitutional:           Skin:             Head:           Eyes:           Lymph:      ENT:           Neck:           Respiratory:            Cardiac:                                                           GI:           Extremities and Muscular Skeletal:                 Neurological:           Psych:           Recent Lab Results:  LIPID RESULTS:  Lab Results   Component Value Date    CHOL 157 01/06/2021    HDL 53 01/06/2021    LDL 56 01/06/2021    TRIG 242 (H) 01/06/2021       LIVER ENZYME RESULTS:  Lab Results   Component Value Date    AST 29 01/06/2021    ALT 26 01/06/2021       CBC RESULTS:  Lab Results   Component Value Date    WBC 7.1 11/13/2019    RBC 3.90 (L) 11/13/2019    HGB 14.4 11/13/2019    HCT 41.3 11/13/2019     (H) 11/13/2019    MCH 36.9 (H) 11/13/2019    MCHC 34.9 11/13/2019    RDW 11.4 11/13/2019     (L) 11/13/2019       BMP RESULTS:  Lab Results   Component Value Date     11/13/2019    POTASSIUM 4.2 01/06/2021    CHLORIDE 103 11/13/2019    CO2 25 11/13/2019    ANIONGAP 7 11/13/2019     (A) 01/06/2021    BUN 17 11/13/2019    CR 0.77 01/06/2021    GFRESTIMATED >60 01/06/2021    GFRESTBLACK >60 01/06/2021    SALOME 9.2 11/13/2019        A1C RESULTS:  Lab Results   Component Value Date    A1C 5.2 11/12/2018       INR RESULTS:  Lab Results   Component Value Date    INR 3.2 (H) 11/02/2021    INR 2.4 (H) 09/21/2021    INR 2.00 (H) 06/29/2021    INR 2.10 (H) 06/15/2021           CC  No referring provider " defined for this encounter.

## 2021-11-10 NOTE — TELEPHONE ENCOUNTER
OK, in that case please get echo with contrast.   If no LV thrombus, no AF/ mode switches from EP standpoint, then check with PCP and if no clear indication - stop anticoagulation and start aspirin 81 mg daily.     ThanksJono

## 2021-11-10 NOTE — LETTER
11/10/2021    Janet Alcaraz PA-C  [address]  [facility] [address]    RE: Tae Arguelles       Dear Colleague,    I had the pleasure of seeing Tae Arguelles in the [facility].    HPI and Plan:   See dictation [id number]    Total 45 minutes spent reviewing outside records, discussing with patient and documentation for complex HFREF and VT    Orders Placed This Encounter   Procedures     Follow-Up with [facility] - SHARIF visit     Follow-Up with [facility] - Return MD visit     No orders of the defined types were placed in this encounter.    There are no discontinued medications.      Encounter Diagnosis   Name Primary?     Ischemic cardiomyopathy        CURRENT MEDICATIONS:  Current Outpatient Medications   Medication Sig Dispense Refill     atorvastatin (LIPITOR) 20 MG tablet Take 20 mg by mouth       Cholecalciferol (VITAMIN D) 1000 UNITS capsule Take 1 capsule by mouth daily.       fluocinolone (SYNALAR) 0.01 % solution PRN 60 mL 1     FUROSEMIDE 40 MG OR TABS 1 TABLET DAILY       hydrocortisone (WESTCORT) 0.2 % external cream as needed 45 g 1     LISINOPRIL 10 MG OR TABS 1 TABLET DAILY       metoprolol (LOPRESSOR) 50 MG tablet Take 1 tablet by mouth 2 times daily. 180 tablet 3     multivitamin, therapeutic with minerals (MULTI-VITAMIN) TABS Take 1 tablet by mouth daily.       NIACIN 750 MG OR TBCR 2 tabs qhs       nitroglycerin (NITROSTAT) 0.4 MG SL tablet Place  under the tongue every 5 minutes as needed.       warfarin ANTICOAGULANT (COUMADIN) 5 MG tablet Take 1 tablet (5 mg) on Mondays/Wednesdays/Fridays and take 1.5 tablets (7.5 mg) all other days or as directed by the INR clinic 150 tablet 0       ALLERGIES     Allergies   Allergen Reactions     Septra [Sulfa Drugs]        PAST MEDICAL HISTORY:  Past Medical History:   Diagnosis Date     BPH (benign prostatic hyperplasia)     Dr. Whitfield, PSA 0.27 12/2012     CAD (coronary artery  disease)     MI      CHF (congestive heart failure) (H)      Colon polyp      History of smoking     Quit      HTN (hypertension), benign      Hyperlipidemia LDL goal <100      Ischemic cardiomyopathy     Dr. Alva     Lichen planus      Normal colonoscopy 1-3-08    Repeat 5-10 years     Pacemaker 2005    ventricular tachycardia     Pleural effusion      Pneumonia      Pneumothorax     plus rib fx secondary to fall (L)     Seborrheic dermatitis      Toe fracture, right        PAST SURGICAL HISTORY:  Past Surgical History:   Procedure Laterality Date     C CABG, ARTERIAL, TWO       COLONOSCOPY N/A 2015    Procedure: COLONOSCOPY;  Surgeon: Zac Austin MD;  Location:  GI     MOUTH SURGERY      Upper and lower dentures       FAMILY HISTORY:  Family History   Problem Relation Age of Onset     Diabetes Father      Cancer Father 73        lung and bone ca     Diabetes Sister      Kidney Cancer Sister 77     Breast Cancer Sister      Uterine Cancer Sister      Hypertension Mother      Cerebrovascular Disease Mother          age 75     Thyroid Disease Mother      Kidney Cancer Brother      Diabetes Brother      Heart Defect Brother        SOCIAL HISTORY:  Social History     Socioeconomic History     Marital status:      Spouse name: Not on file     Number of children: Not on file     Years of education: Not on file     Highest education level: Not on file   Occupational History     Not on file   Tobacco Use     Smoking status: Former Smoker     Quit date: 10/23/1997     Years since quittin.0     Smokeless tobacco: Never Used   Substance and Sexual Activity     Alcohol use: Yes     Alcohol/week: 0.0 standard drinks     Comment: 7 beers a week     Drug use: No     Sexual activity: Yes     Partners: Female   Other Topics Concern     Parent/sibling w/ CABG, MI or angioplasty before 65F 55M? Not Asked   Social History Narrative    , wife Nadira.     Social  "Determinants of Health     Financial Resource Strain: Not on file   Food Insecurity: Not on file   Transportation Needs: Not on file   Physical Activity: Not on file   Stress: Not on file   Social Connections: Not on file   Intimate Partner Violence: Not on file   Housing Stability: Not on file       Review of Systems:  Skin:  Negative     Eyes:  Negative    ENT:  Negative    Respiratory:  Negative    Cardiovascular:  Negative    Gastroenterology: Negative    Genitourinary:  Negative    Musculoskeletal:  Negative    Neurologic:  Negative    Psychiatric:  Negative    Heme/Lymph/Imm:  Negative    Endocrine:  Negative      Physical Exam:  Vitals: /67   Pulse 81   Ht 1.702 m (5' 7\")   Wt 81.6 kg (180 lb)   BMI 28.19 kg/m      Constitutional:           Skin:             Head:           Eyes:           Lymph:      ENT:           Neck:           Respiratory:            Cardiac:                                                           GI:           Extremities and Muscular Skeletal:                 Neurological:           Psych:           Recent Lab Results:  LIPID RESULTS:  Lab Results   Component Value Date    CHOL 157 01/06/2021    HDL 53 01/06/2021    LDL 56 01/06/2021    TRIG 242 (H) 01/06/2021       LIVER ENZYME RESULTS:  Lab Results   Component Value Date    AST 29 01/06/2021    ALT 26 01/06/2021       CBC RESULTS:  Lab Results   Component Value Date    WBC 7.1 11/13/2019    RBC 3.90 (L) 11/13/2019    HGB 14.4 11/13/2019    HCT 41.3 11/13/2019     (H) 11/13/2019    MCH 36.9 (H) 11/13/2019    MCHC 34.9 11/13/2019    RDW 11.4 11/13/2019     (L) 11/13/2019       BMP RESULTS:  Lab Results   Component Value Date     11/13/2019    POTASSIUM 4.2 01/06/2021    CHLORIDE 103 11/13/2019    CO2 25 11/13/2019    ANIONGAP 7 11/13/2019     (A) 01/06/2021    BUN 17 11/13/2019    CR 0.77 01/06/2021    GFRESTIMATED >60 01/06/2021    GFRESTBLACK >60 01/06/2021    SALOME 9.2 11/13/2019        A1C " RESULTS:  Lab Results   Component Value Date    A1C 5.2 11/12/2018       INR RESULTS:  Lab Results   Component Value Date    INR 3.2 (H) 11/02/2021    INR 2.4 (H) 09/21/2021    INR 2.00 (H) 06/29/2021    INR 2.10 (H) 06/15/2021           CC  No referring provider defined for this encounter.                      Thank you for allowing me to participate in the care of your patient.      Sincerely,     Jono Santiago MD     [facility]  cc:   No referring provider defined for this encounter.

## 2021-11-16 ENCOUNTER — ANTICOAGULATION THERAPY VISIT (OUTPATIENT)
Dept: ANTICOAGULATION | Facility: CLINIC | Age: 71
End: 2021-11-16

## 2021-11-16 ENCOUNTER — LAB (OUTPATIENT)
Dept: LAB | Facility: CLINIC | Age: 71
End: 2021-11-16
Payer: MEDICARE

## 2021-11-16 DIAGNOSIS — I25.5 ISCHEMIC CARDIOMYOPATHY: Primary | ICD-10-CM

## 2021-11-16 DIAGNOSIS — I25.5 ISCHEMIC CARDIOMYOPATHY: ICD-10-CM

## 2021-11-16 LAB — INR BLD: 3 (ref 0.9–1.1)

## 2021-11-16 PROCEDURE — 85610 PROTHROMBIN TIME: CPT

## 2021-11-16 PROCEDURE — 36416 COLLJ CAPILLARY BLOOD SPEC: CPT

## 2021-11-16 NOTE — PROGRESS NOTES
ANTICOAGULATION MANAGEMENT     Tae Arguelles 71 year old male is on warfarin with therapeutic INR result. (Goal INR 2.0-3.0)    Recent labs: (last 7 days)     11/16/21  1007   INR 3.0*       ASSESSMENT     Source(s): Chart Review and Patient/Caregiver Call       Warfarin doses taken: Warfarin taken as instructed    Diet: No new diet changes identified    New illness, injury, or hospitalization: No    Medication/supplement changes: None noted    Signs or symptoms of bleeding or clotting: No    Previous INR: Supratherapeutic    Additional findings: None     PLAN     Recommended plan for no diet, medication or health factor changes affecting INR     Dosing Instructions: Continue your current warfarin dose with next INR in 6 weeks       Summary  As of 11/16/2021    Full warfarin instructions:  5 mg every Mon, Wed, Fri; 7.5 mg all other days   Next INR check:  12/28/2021             Telephone call with Tae who verbalizes understanding and agrees to plan    Lab visit scheduled    Education provided: None required    Plan made per [facility] anticoagulation protocol    Stefany Elena, RN  Anticoagulation Clinic  11/16/2021    _______________________________________________________________________     Anticoagulation Episode Summary     Current INR goal:  2.0-3.0   TTR:  75.7 % (1 y)   Target end date:  Indefinite   Send INR reminders to:  [facility]    Indications    Ischemic cardiomyopathy [I25.5]           Comments:           Anticoagulation Care Providers     Provider Role Specialty Phone number    Janet Alcaraz PA-C Referring Internal Medicine [phone]    Cole Armendariz MD Referring Internal Medicine [phone]

## 2021-11-22 ENCOUNTER — HOSPITAL ENCOUNTER (OUTPATIENT)
Dept: CARDIOLOGY | Facility: CLINIC | Age: 71
Discharge: HOME | End: 2021-11-22
Attending: INTERNAL MEDICINE | Admitting: INTERNAL MEDICINE
Payer: MEDICARE

## 2021-11-22 DIAGNOSIS — I25.5 ISCHEMIC CARDIOMYOPATHY: ICD-10-CM

## 2021-11-22 LAB — LVEF ECHO: NORMAL

## 2021-11-22 PROCEDURE — 93306 TTE W/DOPPLER COMPLETE: CPT | Mod: 26 | Performed by: INTERNAL MEDICINE

## 2021-11-22 PROCEDURE — 255N000002 HC RX 255 OP 636: Performed by: INTERNAL MEDICINE

## 2021-11-22 PROCEDURE — 999N000208 ECHOCARDIOGRAM COMPLETE

## 2021-11-22 RX ADMIN — HUMAN ALBUMIN MICROSPHERES AND PERFLUTREN 9 ML: 10; .22 INJECTION, SOLUTION INTRAVENOUS at 14:35

## 2021-11-24 NOTE — PATIENT INSTRUCTIONS
[facility] Dr. Santiago is looking for cardiology records from around 1997. The [facility] discharge note from 11/5/1997 is scanned in the media tab. That note states that it was felt in the patient's best interest to be on coumadin for 6 months presumably d/t his large MI. Dr. Santiago would like clinic records so we can review whether there was reason the pt was continued on warfarin past the 6 months that was recommended in the hospital discharge note. [facility] Rep told me he cannot access this information from his remote work location. We need to fax the request to [phone] with a note asking for specific records. Records request faxed with note to send any Cardiology clinic notes from 11/6/1997 to 2003. We have a [facility] note from 2003 that shows Coumadin on the med list be we want notes between the discharge and 2003 that explain why the patient was continued on Coumadin. Will await records and update Dr. Santiago once we find this out. Sheyla Lock RN on 11/24/2021 at 3:13 PM

## 2021-12-08 ENCOUNTER — LAB (OUTPATIENT)
Dept: LAB | Facility: CLINIC | Age: 71
End: 2021-12-08
Payer: MEDICARE

## 2021-12-08 ENCOUNTER — OFFICE VISIT (OUTPATIENT)
Dept: CARDIOLOGY | Facility: CLINIC | Age: 71
End: 2021-12-08
Payer: MEDICARE

## 2021-12-08 ENCOUNTER — CARE COORDINATION (OUTPATIENT)
Dept: CARDIOLOGY | Facility: CLINIC | Age: 71
End: 2021-12-08

## 2021-12-08 VITALS
HEART RATE: 74 BPM | SYSTOLIC BLOOD PRESSURE: 130 MMHG | WEIGHT: 188 LBS | BODY MASS INDEX: 29.51 KG/M2 | HEIGHT: 67 IN | DIASTOLIC BLOOD PRESSURE: 80 MMHG | OXYGEN SATURATION: 99 %

## 2021-12-08 DIAGNOSIS — I50.22 CHRONIC SYSTOLIC CONGESTIVE HEART FAILURE (H): ICD-10-CM

## 2021-12-08 DIAGNOSIS — I25.5 ISCHEMIC CARDIOMYOPATHY: ICD-10-CM

## 2021-12-08 DIAGNOSIS — I25.10 CORONARY ARTERY DISEASE INVOLVING NATIVE CORONARY ARTERY OF NATIVE HEART WITHOUT ANGINA PECTORIS: ICD-10-CM

## 2021-12-08 DIAGNOSIS — I50.22 CHRONIC SYSTOLIC CONGESTIVE HEART FAILURE (H): Primary | ICD-10-CM

## 2021-12-08 LAB
ANION GAP SERPL CALCULATED.3IONS-SCNC: 6 MMOL/L (ref 3–14)
BUN SERPL-MCNC: 12 MG/DL (ref 7–30)
CALCIUM SERPL-MCNC: 9.8 MG/DL (ref 8.5–10.1)
CHLORIDE BLD-SCNC: 105 MMOL/L (ref 94–109)
CO2 SERPL-SCNC: 28 MMOL/L (ref 20–32)
CREAT SERPL-MCNC: 0.73 MG/DL (ref 0.66–1.25)
ERYTHROCYTE [DISTWIDTH] IN BLOOD BY AUTOMATED COUNT: 11.9 % (ref 10–15)
GFR SERPL CREATININE-BSD FRML MDRD: >90 ML/MIN/1.73M2
GLUCOSE BLD-MCNC: 117 MG/DL (ref 70–99)
HCT VFR BLD AUTO: 42.8 % (ref 40–53)
HGB BLD-MCNC: 14.5 G/DL (ref 13.3–17.7)
MCH RBC QN AUTO: 35.6 PG (ref 26.5–33)
MCHC RBC AUTO-ENTMCNC: 33.9 G/DL (ref 31.5–36.5)
MCV RBC AUTO: 105 FL (ref 78–100)
NT-PROBNP SERPL-MCNC: 892 PG/ML (ref 0–125)
PLATELET # BLD AUTO: 136 10E3/UL (ref 150–450)
POTASSIUM BLD-SCNC: 4.2 MMOL/L (ref 3.4–5.3)
RBC # BLD AUTO: 4.07 10E6/UL (ref 4.4–5.9)
SODIUM SERPL-SCNC: 139 MMOL/L (ref 133–144)
TSH SERPL DL<=0.005 MIU/L-ACNC: 1.88 MU/L (ref 0.4–4)
WBC # BLD AUTO: 8.6 10E3/UL (ref 4–11)

## 2021-12-08 PROCEDURE — 36415 COLL VENOUS BLD VENIPUNCTURE: CPT

## 2021-12-08 PROCEDURE — 83880 ASSAY OF NATRIURETIC PEPTIDE: CPT

## 2021-12-08 PROCEDURE — 99214 OFFICE O/P EST MOD 30 MIN: CPT | Performed by: PHYSICIAN ASSISTANT

## 2021-12-08 PROCEDURE — 80048 BASIC METABOLIC PNL TOTAL CA: CPT

## 2021-12-08 PROCEDURE — 84443 ASSAY THYROID STIM HORMONE: CPT

## 2021-12-08 PROCEDURE — 85027 COMPLETE CBC AUTOMATED: CPT

## 2021-12-08 RX ORDER — METOPROLOL TARTRATE 50 MG
50 TABLET ORAL 2 TIMES DAILY
Qty: 180 TABLET | Refills: 3 | Status: CANCELLED | OUTPATIENT
Start: 2021-12-08

## 2021-12-08 RX ORDER — NITROGLYCERIN 0.4 MG/1
0.4 TABLET SUBLINGUAL EVERY 5 MIN PRN
Qty: 30 TABLET | Refills: 11 | Status: SHIPPED | OUTPATIENT
Start: 2021-12-08 | End: 2023-10-26

## 2021-12-08 RX ORDER — FUROSEMIDE 40 MG
40 TABLET ORAL DAILY
Qty: 90 TABLET | Refills: 3 | Status: SHIPPED | OUTPATIENT
Start: 2021-12-08 | End: 2022-01-20

## 2021-12-08 RX ORDER — METOPROLOL SUCCINATE 200 MG/1
200 TABLET, EXTENDED RELEASE ORAL DAILY
Qty: 90 TABLET | Refills: 3 | Status: SHIPPED | OUTPATIENT
Start: 2021-12-08 | End: 2022-11-29

## 2021-12-08 RX ORDER — ASPIRIN 81 MG/1
81 TABLET, CHEWABLE ORAL DAILY
COMMUNITY
Start: 2021-12-08

## 2021-12-08 RX ORDER — ATORVASTATIN CALCIUM 20 MG/1
20 TABLET, FILM COATED ORAL DAILY
Qty: 90 TABLET | Refills: 3 | Status: SHIPPED | OUTPATIENT
Start: 2021-12-08 | End: 2022-03-30 | Stop reason: DRUGHIGH

## 2021-12-08 RX ORDER — LISINOPRIL 10 MG/1
10 TABLET ORAL DAILY
Qty: 90 TABLET | Refills: 3 | Status: SHIPPED | OUTPATIENT
Start: 2021-12-08 | End: 2022-01-20

## 2021-12-08 RX ORDER — SPIRONOLACTONE 25 MG/1
25 TABLET ORAL DAILY
Qty: 90 TABLET | Refills: 3 | Status: SHIPPED | OUTPATIENT
Start: 2021-12-08 | End: 2022-11-29

## 2021-12-08 ASSESSMENT — MIFFLIN-ST. JEOR: SCORE: 1556.39

## 2021-12-08 NOTE — PROGRESS NOTES
[facility]    Received call from Osman with questions after [facility] enrollment with Danika ROBERTSON today.    He noted plan to stop warfarin given no indication found to continue it. He wanted to confirm that plan is to instead start asprin 81 mg daily, which we reviewed.     His visit [facility] noted changes to lipitor and nitroglycerin. After reviewing together, we decided that this was not a specific change, rather that the meds were refilled with slight changes to wording. He'll continue the SL nitroglycerin PRN (he said he's taken one in the last 24 years) and the atorvastatin nightly.    We confirmed his follow-up appts, and added a previsit lab in January.    I asked him to please call with questions/concerns between visits.    Future Appointments   Date Time Provider Department Center   12/15/2021  1:15 PM [facility] O[facility]   12[facility]20[facility]2021 12[facility]   12/31/2021 10:40 AM [facility]X COVID VACCINE PFIZER OXNUR OX   1/19/2022 10:00 AM [facility]   1/20/2022 10:10 AM [facility]     Carmen Wyman RN BSN   4:30 PM 12/08/21

## 2021-12-08 NOTE — PROGRESS NOTES
[id number]      HPI and Plan:   See dictation    Orders this Visit:  Orders Placed This Encounter   Procedures     N terminal pro BNP outpatient     TSH with free T4 reflex     CBC with platelets     Basic metabolic panel     Basic metabolic panel     Basic metabolic panel     Follow-Up with [facility] - SHARIF visit     Orders Placed This Encounter   Medications     atorvastatin (LIPITOR) 20 MG tablet     Sig: Take 1 tablet (20 mg) by mouth daily     Dispense:  90 tablet     Refill:  3     furosemide (LASIX) 40 MG tablet     Sig: Take 1 tablet (40 mg) by mouth daily     Dispense:  90 tablet     Refill:  3     lisinopril (ZESTRIL) 10 MG tablet     Sig: Take 1 tablet (10 mg) by mouth daily     Dispense:  90 tablet     Refill:  3     nitroGLYcerin (NITROSTAT) 0.4 MG sublingual tablet     Sig: Place 1 tablet (0.4 mg) under the tongue every 5 minutes as needed for chest pain     Dispense:  30 tablet     Refill:  11     metoprolol succinate ER (TOPROL XL) 200 MG 24 hr tablet     Sig: Take 1 tablet (200 mg) by mouth daily     Dispense:  90 tablet     Refill:  3     spironolactone (ALDACTONE) 25 MG tablet     Sig: Take 1 tablet (25 mg) by mouth daily     Dispense:  90 tablet     Refill:  3     aspirin (ASA) 81 MG chewable tablet     Sig: Take 1 tablet (81 mg) by mouth daily     Medications Discontinued During This Encounter   Medication Reason     warfarin ANTICOAGULANT (COUMADIN) 5 MG tablet      metoprolol (LOPRESSOR) 50 MG tablet      LISINOPRIL 10 MG OR TABS Reorder     FUROSEMIDE 40 MG OR TABS Reorder     nitroglycerin (NITROSTAT) 0.4 MG SL tablet Reorder     atorvastatin (LIPITOR) 20 MG tablet Reorder         Encounter Diagnoses   Name Primary?     Ischemic cardiomyopathy      Chronic systolic congestive heart failure (H) Yes     Coronary artery disease involving native coronary artery of native heart without angina pectoris        CURRENT MEDICATIONS:  Current Outpatient Medications   Medication Sig Dispense Refill      aspirin (ASA) 81 MG chewable tablet Take 1 tablet (81 mg) by mouth daily       atorvastatin (LIPITOR) 20 MG tablet Take 1 tablet (20 mg) by mouth daily 90 tablet 3     Cholecalciferol (VITAMIN D) 1000 UNITS capsule Take 1 capsule by mouth daily.       fluocinolone (SYNALAR) 0.01 % solution PRN 60 mL 1     furosemide (LASIX) 40 MG tablet Take 1 tablet (40 mg) by mouth daily 90 tablet 3     hydrocortisone (WESTCORT) 0.2 % external cream as needed 45 g 1     lisinopril (ZESTRIL) 10 MG tablet Take 1 tablet (10 mg) by mouth daily 90 tablet 3     metoprolol succinate ER (TOPROL XL) 200 MG 24 hr tablet Take 1 tablet (200 mg) by mouth daily 90 tablet 3     multivitamin, therapeutic with minerals (MULTI-VITAMIN) TABS Take 1 tablet by mouth daily.       NIACIN 750 MG OR TBCR 2 tabs qhs       nitroGLYcerin (NITROSTAT) 0.4 MG sublingual tablet Place 1 tablet (0.4 mg) under the tongue every 5 minutes as needed for chest pain 30 tablet 11     spironolactone (ALDACTONE) 25 MG tablet Take 1 tablet (25 mg) by mouth daily 90 tablet 3       ALLERGIES     Allergies   Allergen Reactions     Septra [Sulf[facility] Drugs]        PAST MEDICAL HISTORY:  Past Medical History:   Diagnosis Date     BPH (benign prostatic hyperplasia)     Dr. Whitfield, PSA 0.27 12/2012     CAD (coronary artery disease) 1997    MI      CHF (congestive heart failure) (H)      Colon polyp      History of smoking     Quit 1997     HTN (hypertension), benign      Hyperlipidemia LDL goal <100      Ischemic cardiomyopathy     Dr. Alva     Lichen planus      Normal colonoscopy 1-3-08    Repeat 5-10 years     Pacemaker 1997, 2005    ventricular tachycardia     Pleural effusion      Pneumonia 1998     Pneumothorax     plus rib fx secondary to fall (L)     Seborrheic dermatitis      Toe fracture, right        PAST SURGICAL HISTORY:  Past Surgical History:   Procedure Laterality Date     C CABG, ARTERIAL, TWO  1997     COLONOSCOPY N/A 2/12/2015    Procedure: COLONOSCOPY;  Surgeon:  Zac Austin MD;  Location:  GI     [facility] SURGERY      Upper and lower dentures       FAMILY HISTORY:  Family History   Problem Relation Age of Onset     Diabetes Father      Cancer Father 73        lung and bone ca     Diabetes Sister      Kidney Cancer Sister 77     Breast Cancer Sister      Uterine Cancer Sister      Hypertension Mother      Cerebrovascular Disease Mother          age 75     Thyroid Disease Mother      Kidney Cancer Brother      Diabetes Brother      Heart Defect Brother        SOCIAL HISTORY:  Social History     Socioeconomic History     Marital status:      Spouse name: Not on file     Number of children: Not on file     Years of education: Not on file     Highest education level: Not on file   Occupational History     Not on file   Tobacco Use     Smoking status: Former Smoker     Quit date: 10/23/1997     Years since quittin.1     Smokeless tobacco: Never Used   Substance and Sexual Activity     Alcohol use: Yes     Alcohol/week: 0.0 standard drinks     Comment: 7 beers a week     Drug use: No     Sexual activity: Yes     Partners: Female   Other Topics Concern     Parent/sibling w/ CABG, MI or angioplasty before 65F 55M? Not Asked   Social History Narrative    , wife Nadira.     Social Determinants of Health     Financial Resource Strain: Not on file   Food Insecurity: Not on file   Transportation Needs: Not on file   Physical Activity: Not on file   Stress: Not on file   Social Connections: Not on file   Intimate Partner Violence: Not on file   Housing Stability: Not on file       Review of Systems:  Skin:  Negative     Eyes:  Negative    ENT:  Negative    Respiratory:  Negative    Cardiovascular:  Negative    Gastroenterology: Negative    Genitourinary:  Negative    Musculoskeletal:  Negative    Neurologic:  Negative    Psychiatric:  Negative    Heme/Lymph/Imm:  Negative    Endocrine:  Negative      Physical Exam:  Vitals: /80   Pulse 74   Ht  "1.702 m (5' 7\")   Wt 85.3 kg (188 lb)   SpO2 99%   BMI 29.44 kg/m     Please refer to dictation for physical exam    Recent Lab Results: all reviewed today  CBC RESULTS:  Lab Results   Component Value Date    WBC 8.6 12/08/2021    WBC 7.1 11/13/2019    RBC 4.07 (L) 12/08/2021    RBC 3.90 (L) 11/13/2019    HGB 14.5 12/08/2021    HGB 14.4 11/13/2019    HCT 42.8 12/08/2021    HCT 41.3 11/13/2019     (H) 12/08/2021     (H) 11/13/2019    MCH 35.6 (H) 12/08/2021    MCH 36.9 (H) 11/13/2019    MCHC 33.9 12/08/2021    MCHC 34.9 11/13/2019    RDW 11.9 12/08/2021    RDW 11.4 11/13/2019     (L) 12/08/2021     (L) 11/13/2019       BMP RESULTS:  Lab Results   Component Value Date     12/08/2021     11/13/2019    POTASSIUM 4.2 12/08/2021    POTASSIUM 4.2 01/06/2021    CHLORIDE 105 12/08/2021    CHLORIDE 103 11/13/2019    CO2 28 12/08/2021    CO2 25 11/13/2019    ANIONGAP 6 12/08/2021    ANIONGAP 7 11/13/2019     (H) 12/08/2021     (A) 01/06/2021    BUN 12 12/08/2021    BUN 17 11/13/2019    CR 0.73 12/08/2021    CR 0.77 01/06/2021    GFRESTIMATED >90 12/08/2021    GFRESTIMATED >60 01/06/2021    GFRESTBLACK >60 01/06/2021    SALOME 9.8 12/08/2021    SALOME 9.2 11/13/2019        INR RESULTS:  Lab Results   Component Value Date    INR 3.0 (H) 11/16/2021    INR 3.2 (H) 11/02/2021    INR 2.00 (H) 06/29/2021    INR 2.10 (H) 06/15/2021           CC  No referring provider defined for this encounter.      "

## 2021-12-08 NOTE — LETTER
12/8/2021    Janet Alcaraz PA-C  [address]  [place] [address]    RE: Tae Arguelles       Dear Colleague,     I had the pleasure of seeing Tae Arguelles in the [facility].  [id number]      HPI and Plan:   See dictation    Orders this Visit:  Orders Placed This Encounter   Procedures     N terminal pro BNP outpatient     TSH with free T4 reflex     CBC with platelets     Basic metabolic panel     Basic metabolic panel     Basic metabolic panel     Follow-Up with [facility] - SHARIF visit     Orders Placed This Encounter   Medications     atorvastatin (LIPITOR) 20 MG tablet     Sig: Take 1 tablet (20 mg) by mouth daily     Dispense:  90 tablet     Refill:  3     furosemide (LASIX) 40 MG tablet     Sig: Take 1 tablet (40 mg) by mouth daily     Dispense:  90 tablet     Refill:  3     lisinopril (ZESTRIL) 10 MG tablet     Sig: Take 1 tablet (10 mg) by mouth daily     Dispense:  90 tablet     Refill:  3     nitroGLYcerin (NITROSTAT) 0.4 MG sublingual tablet     Sig: Place 1 tablet (0.4 mg) under the tongue every 5 minutes as needed for chest pain     Dispense:  30 tablet     Refill:  11     metoprolol succinate ER (TOPROL XL) 200 MG 24 hr tablet     Sig: Take 1 tablet (200 mg) by mouth daily     Dispense:  90 tablet     Refill:  3     spironolactone (ALDACTONE) 25 MG tablet     Sig: Take 1 tablet (25 mg) by mouth daily     Dispense:  90 tablet     Refill:  3     aspirin (ASA) 81 MG chewable tablet     Sig: Take 1 tablet (81 mg) by mouth daily     Medications Discontinued During This Encounter   Medication Reason     warfarin ANTICOAGULANT (COUMADIN) 5 MG tablet      metoprolol (LOPRESSOR) 50 MG tablet      LISINOPRIL 10 MG OR TABS Reorder     FUROSEMIDE 40 MG OR TABS Reorder     nitroglycerin (NITROSTAT) 0.4 MG SL tablet Reorder     atorvastatin (LIPITOR) 20 MG tablet Reorder         Encounter Diagnoses   Name Primary?     Ischemic cardiomyopathy      Chronic systolic congestive  heart failure (H) Yes     Coronary artery disease involving native coronary artery of native heart without angina pectoris        CURRENT MEDICATIONS:  Current Outpatient Medications   Medication Sig Dispense Refill     aspirin (ASA) 81 MG chewable tablet Take 1 tablet (81 mg) by mouth daily       atorvastatin (LIPITOR) 20 MG tablet Take 1 tablet (20 mg) by mouth daily 90 tablet 3     Cholecalciferol (VITAMIN D) 1000 UNITS capsule Take 1 capsule by mouth daily.       fluocinolone (SYNALAR) 0.01 % solution PRN 60 mL 1     furosemide (LASIX) 40 MG tablet Take 1 tablet (40 mg) by mouth daily 90 tablet 3     hydrocortisone (WESTCORT) 0.2 % external cream as needed 45 g 1     lisinopril (ZESTRIL) 10 MG tablet Take 1 tablet (10 mg) by mouth daily 90 tablet 3     metoprolol succinate ER (TOPROL XL) 200 MG 24 hr tablet Take 1 tablet (200 mg) by mouth daily 90 tablet 3     multivitamin, therapeutic with minerals (MULTI-VITAMIN) TABS Take 1 tablet by mouth daily.       NIACIN 750 MG OR TBCR 2 tabs qhs       nitroGLYcerin (NITROSTAT) 0.4 MG sublingual tablet Place 1 tablet (0.4 mg) under the tongue every 5 minutes as needed for chest pain 30 tablet 11     spironolactone (ALDACTONE) 25 MG tablet Take 1 tablet (25 mg) by mouth daily 90 tablet 3       ALLERGIES     Allergies   Allergen Reactions     Septra [[facility] [employer]]        PAST MEDICAL HISTORY:  Past Medical History:   Diagnosis Date     BPH (benign prostatic hyperplasia)     Dr. Whitfield, PSA 0.27 12/2012     CAD (coronary artery disease) 1997    MI      CHF (congestive heart failure) (H)      Colon polyp      History of smoking     Quit 1997     HTN (hypertension), benign      Hyperlipidemia LDL goal <100      Ischemic cardiomyopathy     Dr. Alva     Lichen planus      Normal colonoscopy 1-3-08    Repeat 5-10 years     Pacemaker 1997, 2005    ventricular tachycardia     Pleural effusion      Pneumonia 1998     Pneumothorax     plus rib fx secondary to fall (L)      Seborrheic dermatitis      Toe fracture, right        PAST SURGICAL HISTORY:  Past Surgical History:   Procedure Laterality Date     C CABG, ARTERIAL, TWO       COLONOSCOPY N/A 2015    Procedure: COLONOSCOPY;  Surgeon: Zac Austin MD;  Location:  GI     [facility] SURGERY      Upper and lower dentures       FAMILY HISTORY:  Family History   Problem Relation Age of Onset     Diabetes Father      Cancer Father 73        lung and bone ca     Diabetes Sister      Kidney Cancer Sister 77     Breast Cancer Sister      Uterine Cancer Sister      Hypertension Mother      Cerebrovascular Disease Mother          age 75     Thyroid Disease Mother      Kidney Cancer Brother      Diabetes Brother      Heart Defect Brother        SOCIAL HISTORY:  Social History     Socioeconomic History     Marital status:      Spouse name: Not on file     Number of children: Not on file     Years of education: Not on file     Highest education level: Not on file   Occupational History     Not on file   Tobacco Use     Smoking status: Former Smoker     Quit date: 10/23/1997     Years since quittin.1     Smokeless tobacco: Never Used   Substance and Sexual Activity     Alcohol use: Yes     Alcohol/week: 0.0 standard drinks     Comment: 7 beers a week     Drug use: No     Sexual activity: Yes     Partners: Female   Other Topics Concern     Parent/sibling w/ CABG, MI or angioplasty before 65F 55M? Not Asked   Social History Narrative    , wife Nadira.     Social Determinants of Health     Financial Resource Strain: Not on file   Food Insecurity: Not on file   Transportation Needs: Not on file   Physical Activity: Not on file   Stress: Not on file   Social Connections: Not on file   Intimate Partner Violence: Not on file   Housing Stability: Not on file       Review of Systems:  Skin:  Negative     Eyes:  Negative    ENT:  Negative    Respiratory:  Negative    Cardiovascular:  Negative    Gastroenterology:  "Negative    Genitourinary:  Negative    Musculoskeletal:  Negative    Neurologic:  Negative    Psychiatric:  Negative    Heme/Lymph/Imm:  Negative    Endocrine:  Negative      Physical Exam:  Vitals: /80   Pulse 74   Ht 1.702 m (5' 7\")   Wt 85.3 kg (188 lb)   SpO2 99%   BMI 29.44 kg/m     Please refer to dictation for physical exam    Recent Lab Results: all reviewed today  CBC RESULTS:  Lab Results   Component Value Date    WBC 8.6 12/08/2021    WBC 7.1 11/13/2019    RBC 4.07 (L) 12/08/2021    RBC 3.90 (L) 11/13/2019    HGB 14.5 12/08/2021    HGB 14.4 11/13/2019    HCT 42.8 12/08/2021    HCT 41.3 11/13/2019     (H) 12/08/2021     (H) 11/13/2019    MCH 35.6 (H) 12/08/2021    MCH 36.9 (H) 11/13/2019    MCHC 33.9 12/08/2021    MCHC 34.9 11/13/2019    RDW 11.9 12/08/2021    RDW 11.4 11/13/2019     (L) 12/08/2021     (L) 11/13/2019       BMP RESULTS:  Lab Results   Component Value Date     12/08/2021     11/13/2019    POTASSIUM 4.2 12/08/2021    POTASSIUM 4.2 01/06/2021    CHLORIDE 105 12/08/2021    CHLORIDE 103 11/13/2019    CO2 28 12/08/2021    CO2 25 11/13/2019    ANIONGAP 6 12/08/2021    ANIONGAP 7 11/13/2019     (H) 12/08/2021     (A) 01/06/2021    BUN 12 12/08/2021    BUN 17 11/13/2019    CR 0.73 12/08/2021    CR 0.77 01/06/2021    GFRESTIMATED >90 12/08/2021    GFRESTIMATED >60 01/06/2021    GFRESTBLACK >60 01/06/2021    SALOME 9.8 12/08/2021    SALOME 9.2 11/13/2019        INR RESULTS:  Lab Results   Component Value Date    INR 3.0 (H) 11/16/2021    INR 3.2 (H) 11/02/2021    INR 2.00 (H) 06/29/2021    INR 2.10 (H) 06/15/2021           CC  No referring provider defined for this encounter.          Thank you for allowing me to participate in the care of your patient.      Sincerely,     GARIMA Rosales [facility]  cc:   No referring provider defined for this encounter.        "

## 2021-12-08 NOTE — LETTER
12/8/2021       RE: Tae Arguelles  [address]  [facility] [address]     Dear Colleague,    Thank you for referring your patient, Tae Arguelles, to the [facility] at [facility]. Please see a copy of my visit note below.    [id number]      HPI and Plan:   See dictation    Orders this Visit:  Orders Placed This Encounter   Procedures     N terminal pro BNP outpatient     TSH with free T4 reflex     CBC with platelets     Basic metabolic panel     Basic metabolic panel     Basic metabolic panel     Follow-Up with [facility] - SHARIF visit     Orders Placed This Encounter   Medications     atorvastatin (LIPITOR) 20 MG tablet     Sig: Take 1 tablet (20 mg) by mouth daily     Dispense:  90 tablet     Refill:  3     furosemide (LASIX) 40 MG tablet     Sig: Take 1 tablet (40 mg) by mouth daily     Dispense:  90 tablet     Refill:  3     lisinopril (ZESTRIL) 10 MG tablet     Sig: Take 1 tablet (10 mg) by mouth daily     Dispense:  90 tablet     Refill:  3     nitroGLYcerin (NITROSTAT) 0.4 MG sublingual tablet     Sig: Place 1 tablet (0.4 mg) under the tongue every 5 minutes as needed for chest pain     Dispense:  30 tablet     Refill:  11     metoprolol succinate ER (TOPROL XL) 200 MG 24 hr tablet     Sig: Take 1 tablet (200 mg) by mouth daily     Dispense:  90 tablet     Refill:  3     spironolactone (ALDACTONE) 25 MG tablet     Sig: Take 1 tablet (25 mg) by mouth daily     Dispense:  90 tablet     Refill:  3     aspirin (ASA) 81 MG chewable tablet     Sig: Take 1 tablet (81 mg) by mouth daily     Medications Discontinued During This Encounter   Medication Reason     warfarin ANTICOAGULANT (COUMADIN) 5 MG tablet      metoprolol (LOPRESSOR) 50 MG tablet      LISINOPRIL 10 MG OR TABS Reorder     FUROSEMIDE 40 MG OR TABS Reorder     nitroglycerin (NITROSTAT) 0.4 MG SL tablet Reorder     atorvastatin (LIPITOR) 20 MG tablet Reorder          Encounter Diagnoses   Name Primary?     Ischemic cardiomyopathy      Chronic systolic congestive heart failure (H) Yes     Coronary artery disease involving native coronary artery of native heart without angina pectoris        CURRENT MEDICATIONS:  Current Outpatient Medications   Medication Sig Dispense Refill     aspirin (ASA) 81 MG chewable tablet Take 1 tablet (81 mg) by mouth daily       atorvastatin (LIPITOR) 20 MG tablet Take 1 tablet (20 mg) by mouth daily 90 tablet 3     Cholecalciferol (VITAMIN D) 1000 UNITS capsule Take 1 capsule by mouth daily.       fluocinolone (SYNALAR) 0.01 % solution PRN 60 mL 1     furosemide (LASIX) 40 MG tablet Take 1 tablet (40 mg) by mouth daily 90 tablet 3     hydrocortisone (WESTCORT) 0.2 % external cream as needed 45 g 1     lisinopril (ZESTRIL) 10 MG tablet Take 1 tablet (10 mg) by mouth daily 90 tablet 3     metoprolol succinate ER (TOPROL XL) 200 MG 24 hr tablet Take 1 tablet (200 mg) by mouth daily 90 tablet 3     multivitamin, therapeutic with minerals (MULTI-VITAMIN) TABS Take 1 tablet by mouth daily.       NIACIN 750 MG OR TBCR 2 tabs qhs       nitroGLYcerin (NITROSTAT) 0.4 MG sublingual tablet Place 1 tablet (0.4 mg) under the tongue every 5 minutes as needed for chest pain 30 tablet 11     spironolactone (ALDACTONE) 25 MG tablet Take 1 tablet (25 mg) by mouth daily 90 tablet 3       ALLERGIES     Allergies   Allergen Reactions     Septra [[facility]]        PAST MEDICAL HISTORY:  Past Medical History:   Diagnosis Date     BPH (benign prostatic hyperplasia)     Dr. Whitfield, PSA 0.27 12/2012     CAD (coronary artery disease) 1997    MI      CHF (congestive heart failure) (H)      Colon polyp      History of smoking     Quit 1997     HTN (hypertension), benign      Hyperlipidemia LDL goal <100      Ischemic cardiomyopathy     Dr. Alva     Lichen planus      Normal colonoscopy 1-3-08    Repeat 5-10 years     Pacemaker 1997, 2005    ventricular tachycardia      Pleural effusion      Pneumonia      Pneumothorax     plus rib fx secondary to fall (L)     Seborrheic dermatitis      Toe fracture, right        PAST SURGICAL HISTORY:  Past Surgical History:   Procedure Laterality Date     C CABG, ARTERIAL, TWO       COLONOSCOPY N/A 2015    Procedure: COLONOSCOPY;  Surgeon: Zac Austin MD;  Location:  GI     [facility] SURGERY      Upper and lower dentures       FAMILY HISTORY:  Family History   Problem Relation Age of Onset     Diabetes Father      Cancer Father 73        lung and bone ca     Diabetes Sister      Kidney Cancer Sister 77     Breast Cancer Sister      Uterine Cancer Sister      Hypertension Mother      Cerebrovascular Disease Mother          age 75     Thyroid Disease Mother      Kidney Cancer Brother      Diabetes Brother      Heart Defect Brother        SOCIAL HISTORY:  Social History     Socioeconomic History     Marital status:      Spouse name: Not on file     Number of children: Not on file     Years of education: Not on file     Highest education level: Not on file   Occupational History     Not on file   Tobacco Use     Smoking status: Former Smoker     Quit date: 10/23/1997     Years since quittin.1     Smokeless tobacco: Never Used   Substance and Sexual Activity     Alcohol use: Yes     Alcohol/week: 0.0 standard drinks     Comment: 7 beers a week     Drug use: No     Sexual activity: Yes     Partners: Female   Other Topics Concern     Parent/sibling w/ CABG, MI or angioplasty before 65F 55M? Not Asked   Social History Narrative    , wife Nadira.     Social Determinants of Health     Financial Resource Strain: Not on file   Food Insecurity: Not on file   Transportation Needs: Not on file   Physical Activity: Not on file   Stress: Not on file   Social Connections: Not on file   Intimate Partner Violence: Not on file   Housing Stability: Not on file       Review of Systems:  Skin:  Negative     Eyes:   "Negative    ENT:  Negative    Respiratory:  Negative    Cardiovascular:  Negative    Gastroenterology: Negative    Genitourinary:  Negative    Musculoskeletal:  Negative    Neurologic:  Negative    Psychiatric:  Negative    Heme/Lymph/Imm:  Negative    Endocrine:  Negative      Physical Exam:  Vitals: /80   Pulse 74   Ht 1.702 m (5' 7\")   Wt 85.3 kg (188 lb)   SpO2 99%   BMI 29.44 kg/m     Please refer to dictation for physical exam    Recent Lab Results: all reviewed today  CBC RESULTS:  Lab Results   Component Value Date    WBC 8.6 12/08/2021    WBC 7.1 11/13/2019    RBC 4.07 (L) 12/08/2021    RBC 3.90 (L) 11/13/2019    HGB 14.5 12/08/2021    HGB 14.4 11/13/2019    HCT 42.8 12/08/2021    HCT 41.3 11/13/2019     (H) 12/08/2021     (H) 11/13/2019    MCH 35.6 (H) 12/08/2021    MCH 36.9 (H) 11/13/2019    MCHC 33.9 12/08/2021    MCHC 34.9 11/13/2019    RDW 11.9 12/08/2021    RDW 11.4 11/13/2019     (L) 12/08/2021     (L) 11/13/2019       BMP RESULTS:  Lab Results   Component Value Date     12/08/2021     11/13/2019    POTASSIUM 4.2 12/08/2021    POTASSIUM 4.2 01/06/2021    CHLORIDE 105 12/08/2021    CHLORIDE 103 11/13/2019    CO2 28 12/08/2021    CO2 25 11/13/2019    ANIONGAP 6 12/08/2021    ANIONGAP 7 11/13/2019     (H) 12/08/2021     (A) 01/06/2021    BUN 12 12/08/2021    BUN 17 11/13/2019    CR 0.73 12/08/2021    CR 0.77 01/06/2021    GFRESTIMATED >90 12/08/2021    GFRESTIMATED >60 01/06/2021    GFRESTBLACK >60 01/06/2021    SALOME 9.8 12/08/2021    SALOME 9.2 11/13/2019        INR RESULTS:  Lab Results   Component Value Date    INR 3.0 (H) 11/16/2021    INR 3.2 (H) 11/02/2021    INR 2.00 (H) 06/29/2021    INR 2.10 (H) 06/15/2021           CC  No referring provider defined for this encounter.        Service Date: 12/08/2021    PRIMARY CARDIOLOGIST:  Dr. Santiago.    REASON FOR VISIT:  Echocardiogram, [facility] enrollment.    HISTORY OF PRESENT ILLNESS:  " Mr. Arguelles is a delightful 71-year-old gentleman with a long history of coronary artery disease, initially followed briefly with Dr. Hurtado here and then at [facility] with Dr. Alva.  He was seen in consultation initially by Dr. Marroquin for switching his ICD here and then by Dr. Santiago.  His past cardiac history significant for the followin.  Large anterior MI in  with an occluded LAD and severe circumflex lesion on coronary angiogram, unable to successfully complete PCI.  The patient underwent 2-vessel CABG, I believe, by Dr. Vera at that time.  Bypass vessels are not available in medical record at this time.  2.  Longstanding ischemic cardiomyopathy with chronic systolic heart failure and EF 30%.  3.  Wide complex tachycardia after his MI with a decreased EF with implantation of an ICD in .  Last gen change, I believe, in .  4.  Chronic anticoagulation with review of notes post-MI stating that given his low EF, they wanted him on anticoagulation for 6 months afterwards.  He is now 23-24 years out from that, no diagnosis of AFib, PE, DVT or LV thrombus.  5.  Hypertension.  6.  Hyperlipidemia.  7.  Remote history of smoking, quit at the time of his MI.  8.  History of appropriate ICD shock.    He switched his care to Dr. Santiago for his heart failure, Dr. Marroquin for EP, and Dr. Santiago ordered an echocardiogram, which was reviewed today.  This shows an EF of 25%-30% with severe global hypokinesis and akinetic apex.  Moderate to severe MR, mild to moderate TR, RVSP of 42 plus right atrial pressure and normal RA pressure.  Based on these results, Dr. Santiago suggested further optimization of his medications, and the patient is here to talk about that today.    The patient overall feels well.  He denies chest pain, shortness of breath, orthopnea, or PND.  He in the spring walked on his treadmill 4 miles about 3-4 times a week at a 70-minute pace.  This summer he worked in the yard, mowing  the lawn, raked, etc.  His treadmill is broken, but he is planning on getting back on it.  He has not had peripheral edema.  Overall, he feels like he is doing well.    SOCIAL HISTORY:  He is .  His wife has COPD, so they have a lot of expensive medications with her.  He is retired from being a  and relays a story of when he was initially diagnosed that his symptoms were misinterpreted as musculoskeletal stress due to his heavy workload and his diagnosis was likely delayed for days, if not weeks.    PHYSICAL EXAMINATION:    GENERAL:  Well-developed, well-nourished gentleman in no acute distress.  HEENT:  Normocephalic, atraumatic.  HEART:  Regular in rate and rhythm.  There is a 3/6 systolic murmur heard best at lower left sternal border.  LUNGS:  Clear, without wheezes, rales, or rhonchi.  EXTREMITIES:  Without peripheral edema.  NECK:  The neck veins are flat at 30 degrees.  SKIN:  Warm and dry.    LABORATORY DATA:  Last labs are reviewed showing creatinine done today after visit at 0.73, potassium 4.2, sodium 139, hemoglobin 14.5, normal white count, thyroid normal, N-terminal proBNP at 892.    ASSESSMENT AND PLAN:    1.  Ischemic cardiomyopathy with chronic systolic heart failure and EF of 25%-30% with associated moderate to severe mitral regurgitation that I would suspect is functional.  We discussed that there are multiple medications that have come out that had been shown to be superior to the medications he is on, both in terms of mortality and morbidity.  The patient notes he has done well for years on these and is a little bit reluctant to change, but we reviewed that while he is stable now, there is some hints that things will not be stable forever, including his moderate to severe mitral regurgitation that could worsen over time, worsening of his EF, etc.  Ideally, I would get him on Entresto, but he is concerned about the cost of this, especially in light of the cost of his wife's other  expensive medications.  I will reach out to our pharmacy liaison to see if we can get some pricing on that.  He tells me when he called it would be something like $400 a month, which would be quite expensive, but we will see what we can do otherwise.  He is open to, though, starting spironolactone at 25 mg a day with a repeat BMP in 1 week and then again in 1 month.  In addition, we are going to switch him from Lopressor over to Toprol at 200 mg once a day for ease of dosing.  If we can make Entresto affordable, our next step would be to switch out of his lisinopril and start him on Entresto.  Jardiance would be the one after that.  With this, I am hopeful we can get a tiny bit of a reverse remodeling and postpone or prevent problems further down the road.  2.  History of nonsustained VT with ICD in place with known shock, followed on device.  We will continue to follow device closely.  3.  Anticoagulation with no indication found.  In fact, I did find his initial notes that said he should only be on it 6 months post-MI over 20 years ago.  His device check has not shown AFib, he has not had a PE, he has not had DVT, and we will discontinue it today.  He should take just aspirin 81 mg daily in its place for his coronary disease, of course, and not for any arrhythmia.  4.  Coronary artery disease with history of bypass x2 of unclear vessels.  Suspect circ and LAD based on cath report at that time.  We will start aspirin.  Is otherwise appropriately on statin.  5.  Dyslipidemia, excellent control on Lipitor 20 with last LDL 56, HDL 53, total cholesterol 157, triglycerides 242.  I congratulated the patient on his excellent work with lifestyle changes to prevent worsening of coronary artery disease.  His last ischemic eval was a nuc in 2012 showing an expected medium anterior and apical infarct.  In addition, he had infarct in the basal, mid, and apical inferolateral wall.  6.  Hypertension, well controlled.  Should be  even better with a little bit of spironolactone.    We will continue to optimize this patient's medications over the next several months.  I will see him back in about 6 weeks with a BMP before that visit and again he will get a BMP in 1 week, sooner with concerns.    Danika Ye PA-C        D: 2021   T: 2021   MT: luis    Name:     SHANA TREJOMina  MRN:      [id number]        Account:      [id number]   :      1950           Service Date: 2021       Document: [id number]

## 2021-12-08 NOTE — PATIENT INSTRUCTIONS
Call [facility] nurse for any questions or concerns Mon-Fri 8am-4pm:                                                #[phone]                                       For concerns after hours:                                               #[phone]     1: Medication changes:   Stop taking coumadin/ warfarin.   Start taking spironolactone/ Aldactone 25 mg once a day.    Stop taking Lopressor/ metoprolol tartrate.   Start taking Toprol XL/ metoprolol succinate 200 mg once a day.      I'll talk to the pharmacy team about getting Entresto for a reasonable price.     2: Plan from today: labs today.    Repeat labs in 1 week, and I'll see you back in about 6 weeks.

## 2021-12-08 NOTE — PROGRESS NOTES
Service Date: 2021    PRIMARY CARDIOLOGIST:  Dr. Santiago.    REASON FOR VISIT:  Echocardiogram, C.O.R.E. Clinic enrollment.    HISTORY OF PRESENT ILLNESS:  Mr. Arguelles is a delightful 71-year-old gentleman with a long history of coronary artery disease, initially followed briefly with Dr. Hurtado here and then at [facility] with Dr. Alva.  He was seen in consultation initially by Dr. Marroquin for switching his ICD here and then by Dr. Santiago.  His past cardiac history significant for the followin.  Large anterior MI in  with an occluded LAD and severe circumflex lesion on coronary angiogram, unable to successfully complete PCI.  The patient underwent 2-vessel CABG, I believe, by Dr. Vera at that time.  Bypass vessels are not available in medical record at this time.  2.  Longstanding ischemic cardiomyopathy with chronic systolic heart failure and EF 30%.  3.  Wide complex tachycardia after his MI with a decreased EF with implantation of an ICD in .  Last gen change, I believe, in .  4.  Chronic anticoagulation with review of notes post-MI stating that given his low EF, they wanted him on anticoagulation for 6 months afterwards.  He is now 23-24 years out from that, no diagnosis of AFib, PE, DVT or LV thrombus.  5.  Hypertension.  6.  Hyperlipidemia.  7.  Remote history of smoking, quit at the time of his MI.  8.  History of appropriate ICD shock.    He switched his care to Dr. Santiago for his heart failure, Dr. Marroquin for EP, and Dr. Santiago ordered an echocardiogram, which was reviewed today.  This shows an EF of 25%-30% with severe global hypokinesis and akinetic apex.  Moderate to severe MR, mild to moderate TR, RVSP of 42 plus right atrial pressure and normal RA pressure.  Based on these results, Dr. Santiago suggested further optimization of his medications, and the patient is here to talk about that today.    The patient overall feels well.  He denies chest pain, shortness of  breath, orthopnea, or PND.  He in the spring walked on his treadmill 4 miles about 3-4 times a week at a 70-minute pace.  This summer he worked in the yard, mowing the lawn, raked, etc.  His treadmill is broken, but he is planning on getting back on it.  He has not had peripheral edema.  Overall, he feels like he is doing well.    SOCIAL HISTORY:  He is .  His wife has COPD, so they have a lot of expensive medications with her.  He is retired from being a  and relays a story of when he was initially diagnosed that his symptoms were misinterpreted as musculoskeletal stress due to his heavy workload and his diagnosis was likely delayed for days, if not weeks.    PHYSICAL EXAMINATION:    GENERAL:  Well-developed, well-nourished gentleman in no acute distress.  HEENT:  Normocephalic, atraumatic.  HEART:  Regular in rate and rhythm.  There is a 3/6 systolic murmur heard best at lower left sternal border.  LUNGS:  Clear, without wheezes, rales, or rhonchi.  EXTREMITIES:  Without peripheral edema.  NECK:  The neck veins are flat at 30 degrees.  SKIN:  Warm and dry.    LABORATORY DATA:  Last labs are reviewed showing creatinine done today after visit at 0.73, potassium 4.2, sodium 139, hemoglobin 14.5, normal white count, thyroid normal, N-terminal proBNP at 892.    ASSESSMENT AND PLAN:    1.  Ischemic cardiomyopathy with chronic systolic heart failure and EF of 25%-30% with associated moderate to severe mitral regurgitation that I would suspect is functional.  We discussed that there are multiple medications that have come out that had been shown to be superior to the medications he is on, both in terms of mortality and morbidity.  The patient notes he has done well for years on these and is a little bit reluctant to change, but we reviewed that while he is stable now, there is some hints that things will not be stable forever, including his moderate to severe mitral regurgitation that could worsen over  time, worsening of his EF, etc.  Ideally, I would get him on Entresto, but he is concerned about the cost of this, especially in light of the cost of his wife's other expensive medications.  I will reach out to our pharmacy liaison to see if we can get some pricing on that.  He tells me when he called it would be something like $400 a month, which would be quite expensive, but we will see what we can do otherwise.  He is open to, though, starting spironolactone at 25 mg a day with a repeat BMP in 1 week and then again in 1 month.  In addition, we are going to switch him from Lopressor over to Toprol at 200 mg once a day for ease of dosing.  If we can make Entresto affordable, our next step would be to switch out of his lisinopril and start him on Entresto.  Jardiance would be the one after that.  With this, I am hopeful we can get a tiny bit of a reverse remodeling and postpone or prevent problems further down the road.  2.  History of nonsustained VT with ICD in place with known shock, followed on device.  We will continue to follow device closely.  3.  Anticoagulation with no indication found.  In fact, I did find his initial notes that said he should only be on it 6 months post-MI over 20 years ago.  His device check has not shown AFib, he has not had a PE, he has not had DVT, and we will discontinue it today.  He should take just aspirin 81 mg daily in its place for his coronary disease, of course, and not for any arrhythmia.  4.  Coronary artery disease with history of bypass x2 of unclear vessels.  Suspect circ and LAD based on cath report at that time.  We will start aspirin.  Is otherwise appropriately on statin.  5.  Dyslipidemia, excellent control on Lipitor 20 with last LDL 56, HDL 53, total cholesterol 157, triglycerides 242.  I congratulated the patient on his excellent work with lifestyle changes to prevent worsening of coronary artery disease.  His last ischemic eval was a nuc in 2012 showing an  expected medium anterior and apical infarct.  In addition, he had infarct in the basal, mid, and apical inferolateral wall.  6.  Hypertension, well controlled.  Should be even better with a little bit of spironolactone.    We will continue to optimize this patient's medications over the next several months.  I will see him back in about 6 weeks with a BMP before that visit and again he will get a BMP in 1 week, sooner with concerns.    Danika Ye PA-C        D: 2021   T: 2021   MT: luis    Name:     SHANA TREJO  MRN:      [id number]        Account:      [id number]   :      1950           Service Date: 2021       Document: [id number]

## 2021-12-10 NOTE — PROGRESS NOTES
[facility]    -Received a call from Tae regarding what time of day he should be taking these new medications. Reviewed with pt.  Also reviewed labs with Tae.  Discussed the cost of Entresto. He would like to think about it. In the mean time I have sent him the current cost breakdown that Raine had sent and the pt assistance program application for [employer].      From: Raine Whiting   Sent: 12/9/2021  12:49 PM CST   To: Danika Ye PA-C   Subject: RE: entresto cost?                               Patient has Medicare D through [employer].     Dec: $224 remaining 2021 deductible + $47 copay = $271.   Jan: $480 2022 deductible + $25 copay = $505   Feb-July: $107/mo   Aug-Dec: $157/mo (coverage gap)     Since this isn't the greatest coverage, I will provide some resources:     --[facility] kerrie.  It appears the patient has primary care through [facility].  If this is true, he may be eligible for [facility]'s once per calendar year kerrie of $500 to pay for medications (this would cover about 1 mo of Eliquis).  Patient should apply even if he doesn't meet the stated criteria.  More information can be found at [email or web address], or by calling [phone].     --Social Security subsidy.  Eligibility for this is based on both assets and income.  Patient can apply at ssa.gov/extrahelp, by calling 1-865.690.1702, or at the Social Security office.     -- program.  The  of Entresto offers a generous free drug through the mail to income-eligible patients.  Application and info at http://www.patientassistancenow.com or by calling 1-177.200.1519.     -MERVAT Whiting, Pharmacy Technician/Liaison, Discharge Pharmacy      Donita Oro RN on 12/10/2021 at 12:30 PM

## 2021-12-15 ENCOUNTER — LAB (OUTPATIENT)
Dept: LAB | Facility: CLINIC | Age: 71
End: 2021-12-15
Payer: MEDICARE

## 2021-12-15 DIAGNOSIS — I50.22 CHRONIC SYSTOLIC CONGESTIVE HEART FAILURE (H): ICD-10-CM

## 2021-12-15 PROCEDURE — 80048 BASIC METABOLIC PNL TOTAL CA: CPT

## 2021-12-15 PROCEDURE — 36415 COLL VENOUS BLD VENIPUNCTURE: CPT

## 2021-12-16 ENCOUNTER — TELEPHONE (OUTPATIENT)
Dept: CARDIOLOGY | Facility: CLINIC | Age: 71
End: 2021-12-16
Payer: MEDICARE

## 2021-12-16 ENCOUNTER — TELEPHONE (OUTPATIENT)
Dept: FAMILY MEDICINE | Facility: CLINIC | Age: 71
End: 2021-12-16
Payer: MEDICARE

## 2021-12-16 LAB
ANION GAP SERPL CALCULATED.3IONS-SCNC: 8 MMOL/L (ref 3–14)
BUN SERPL-MCNC: 14 MG/DL (ref 7–30)
CALCIUM SERPL-MCNC: 9.3 MG/DL (ref 8.5–10.1)
CHLORIDE BLD-SCNC: 102 MMOL/L (ref 94–109)
CO2 SERPL-SCNC: 26 MMOL/L (ref 20–32)
CREAT SERPL-MCNC: 0.77 MG/DL (ref 0.66–1.25)
GFR SERPL CREATININE-BSD FRML MDRD: 90 ML/MIN/1.73M2
GLUCOSE BLD-MCNC: 89 MG/DL (ref 70–99)
POTASSIUM BLD-SCNC: 4 MMOL/L (ref 3.4–5.3)
SODIUM SERPL-SCNC: 136 MMOL/L (ref 133–144)

## 2021-12-16 NOTE — TELEPHONE ENCOUNTER
----- Message from Danika Ye PA-C sent at 12/16/2021 11:34 AM CST -----  Started on pao, normal cr and K, pls continue,.

## 2021-12-16 NOTE — TELEPHONE ENCOUNTER
Pt wanted to let inr team know that he was taken off of coumadin by Cardiology.  Soco Alonzo, RN  [facility] RN Triage Team

## 2021-12-16 NOTE — TELEPHONE ENCOUNTER
[facility]    - Left  to review results.     Donita Oro RN on 12/16/2021 at 11:44 AM

## 2021-12-20 ENCOUNTER — ANCILLARY PROCEDURE (OUTPATIENT)
Dept: CARDIOLOGY | Facility: CLINIC | Age: 71
End: 2021-12-20
Attending: INTERNAL MEDICINE
Payer: MEDICARE

## 2021-12-20 DIAGNOSIS — Z95.810 ICD (IMPLANTABLE CARDIOVERTER-DEFIBRILLATOR) IN PLACE: ICD-10-CM

## 2021-12-20 PROCEDURE — 93296 REM INTERROG EVL PM/IDS: CPT | Performed by: INTERNAL MEDICINE

## 2021-12-20 PROCEDURE — 93295 DEV INTERROG REMOTE 1/2/MLT: CPT | Performed by: INTERNAL MEDICINE

## 2021-12-29 NOTE — PROGRESS NOTES
Old 1997 hospital records received and [facility] to [facility].     In summary:     - [facility] Discharge note 11/5/1997:   * Mr. Tae Arguelles was admitted to [facility] suffering a large anterior wall MI. He experienced tachycardia during his hospitalization  And was started on beta blockers. He was also started on ACE inhibitor and it was felt that Coumadin would be in his best interest for approximately 6 months.  * on 10/31/1997 he had 22 beats of a wide complex irregular tachycardia. Dr. Hurtado reviewed it and felt it to be a non sustained ventricular tachycardia. An EP study was performed by Dr. Adelfo Bain. The patient was found to have easily inducible ventricular tachycardia, and with his low ejection fraction it was felt an ICD should be placed.  * Follow up 12/22/1997 [facility] with Dr. Anthony Hurtado  * Discharge med list:  1. Furosemide 40 mg daily  2. Potassium 20 mEq daily  3. Vasotec 5 mg daily  4. Metoprolol 50 mg in the AM and 1/2 tablet in the PM  5. Lipitor 40 mg daily  6. Darvocet N-100, 1 tablet every 6 hours PRN for pain  7. Aspirin 1 daily (Coumadin will be re-addressed as an outpatient)  8. Nitroglycerin sublingual  1/150 grain, 1 sublingual PRN for angina as directed    Danika Ye PA-C, has already stopped patient's warfarin. I sent another fax [facility] send the 12/22/[facility] visit note. Sheyla Lock RN on 12/29/2021 at 11:51 AM

## 2021-12-31 ENCOUNTER — IMMUNIZATION (OUTPATIENT)
Dept: NURSING | Facility: CLINIC | Age: 71
End: 2021-12-31
Payer: MEDICARE

## 2021-12-31 PROCEDURE — 0004A PR COVID VAC PFIZER DIL RECON 30 MCG/0.3 ML IM: CPT

## 2021-12-31 PROCEDURE — 91300 PR COVID VAC PFIZER DIL RECON 30 MCG/0.3 ML IM: CPT

## 2022-01-04 LAB
MDC_IDC_EPISODE_DTM: NORMAL
MDC_IDC_EPISODE_DTM: NORMAL
MDC_IDC_EPISODE_DURATION: 1 S
MDC_IDC_EPISODE_DURATION: 67 S
MDC_IDC_EPISODE_ID: 45
MDC_IDC_EPISODE_ID: 46
MDC_IDC_EPISODE_TYPE: NORMAL
MDC_IDC_EPISODE_TYPE: NORMAL
MDC_IDC_LEAD_IMPLANT_DT: NORMAL
MDC_IDC_LEAD_LOCATION: NORMAL
MDC_IDC_LEAD_MFG: NORMAL
MDC_IDC_LEAD_MODEL: NORMAL
MDC_IDC_LEAD_SERIAL: NORMAL
MDC_IDC_LEAD_SPECIAL_FUNCTION: NORMAL
MDC_IDC_MSMT_BATTERY_DTM: NORMAL
MDC_IDC_MSMT_BATTERY_REMAINING_LONGEVITY: 60 MO
MDC_IDC_MSMT_BATTERY_RRT_TRIGGER: 2.73
MDC_IDC_MSMT_BATTERY_STATUS: NORMAL
MDC_IDC_MSMT_BATTERY_VOLTAGE: 2.98 V
MDC_IDC_MSMT_CAP_CHARGE_DTM: NORMAL
MDC_IDC_MSMT_CAP_CHARGE_ENERGY: 18 J
MDC_IDC_MSMT_CAP_CHARGE_TIME: 3.79
MDC_IDC_MSMT_CAP_CHARGE_TYPE: NORMAL
MDC_IDC_MSMT_LEADCHNL_RV_IMPEDANCE_VALUE: 323 OHM
MDC_IDC_MSMT_LEADCHNL_RV_IMPEDANCE_VALUE: 323 OHM
MDC_IDC_MSMT_LEADCHNL_RV_PACING_THRESHOLD_AMPLITUDE: 2.5 V
MDC_IDC_MSMT_LEADCHNL_RV_PACING_THRESHOLD_PULSEWIDTH: 0.4 MS
MDC_IDC_MSMT_LEADCHNL_RV_SENSING_INTR_AMPL: 13.38 MV
MDC_IDC_MSMT_LEADCHNL_RV_SENSING_INTR_AMPL: 13.38 MV
MDC_IDC_PG_IMPLANT_DTM: NORMAL
MDC_IDC_PG_MFG: NORMAL
MDC_IDC_PG_MODEL: NORMAL
MDC_IDC_PG_SERIAL: NORMAL
MDC_IDC_PG_TYPE: NORMAL
MDC_IDC_SESS_CLINIC_NAME: NORMAL
MDC_IDC_SESS_DTM: NORMAL
MDC_IDC_SESS_TYPE: NORMAL
MDC_IDC_SET_BRADY_HYSTRATE: NORMAL
MDC_IDC_SET_BRADY_LOWRATE: 40 {BEATS}/MIN
MDC_IDC_SET_BRADY_MODE: NORMAL
MDC_IDC_SET_LEADCHNL_RV_PACING_AMPLITUDE: 5 V
MDC_IDC_SET_LEADCHNL_RV_PACING_ANODE_ELECTRODE_1: NORMAL
MDC_IDC_SET_LEADCHNL_RV_PACING_ANODE_LOCATION_1: NORMAL
MDC_IDC_SET_LEADCHNL_RV_PACING_CAPTURE_MODE: NORMAL
MDC_IDC_SET_LEADCHNL_RV_PACING_CATHODE_ELECTRODE_1: NORMAL
MDC_IDC_SET_LEADCHNL_RV_PACING_CATHODE_LOCATION_1: NORMAL
MDC_IDC_SET_LEADCHNL_RV_PACING_POLARITY: NORMAL
MDC_IDC_SET_LEADCHNL_RV_PACING_PULSEWIDTH: 0.4 MS
MDC_IDC_SET_LEADCHNL_RV_SENSING_ANODE_ELECTRODE_1: NORMAL
MDC_IDC_SET_LEADCHNL_RV_SENSING_ANODE_LOCATION_1: NORMAL
MDC_IDC_SET_LEADCHNL_RV_SENSING_CATHODE_ELECTRODE_1: NORMAL
MDC_IDC_SET_LEADCHNL_RV_SENSING_CATHODE_LOCATION_1: NORMAL
MDC_IDC_SET_LEADCHNL_RV_SENSING_POLARITY: NORMAL
MDC_IDC_SET_LEADCHNL_RV_SENSING_SENSITIVITY: 0.3 MV
MDC_IDC_SET_ZONE_DETECTION_BEATS_DENOMINATOR: 40 {BEATS}
MDC_IDC_SET_ZONE_DETECTION_BEATS_NUMERATOR: 30 {BEATS}
MDC_IDC_SET_ZONE_DETECTION_INTERVAL: 320 MS
MDC_IDC_SET_ZONE_DETECTION_INTERVAL: 360 MS
MDC_IDC_SET_ZONE_DETECTION_INTERVAL: 400 MS
MDC_IDC_SET_ZONE_DETECTION_INTERVAL: NORMAL
MDC_IDC_SET_ZONE_TYPE: NORMAL
MDC_IDC_STAT_BRADY_DTM_END: NORMAL
MDC_IDC_STAT_BRADY_DTM_START: NORMAL
MDC_IDC_STAT_BRADY_RV_PERCENT_PACED: 0.03 %
MDC_IDC_STAT_EPISODE_RECENT_COUNT: 0
MDC_IDC_STAT_EPISODE_RECENT_COUNT: 1
MDC_IDC_STAT_EPISODE_RECENT_COUNT_DTM_END: NORMAL
MDC_IDC_STAT_EPISODE_RECENT_COUNT_DTM_START: NORMAL
MDC_IDC_STAT_EPISODE_TOTAL_COUNT: 0
MDC_IDC_STAT_EPISODE_TOTAL_COUNT: 1
MDC_IDC_STAT_EPISODE_TOTAL_COUNT: 14
MDC_IDC_STAT_EPISODE_TOTAL_COUNT_DTM_END: NORMAL
MDC_IDC_STAT_EPISODE_TOTAL_COUNT_DTM_START: NORMAL
MDC_IDC_STAT_EPISODE_TYPE: NORMAL
MDC_IDC_STAT_TACHYTHERAPY_ATP_DELIVERED_RECENT: 0
MDC_IDC_STAT_TACHYTHERAPY_ATP_DELIVERED_TOTAL: 0
MDC_IDC_STAT_TACHYTHERAPY_RECENT_DTM_END: NORMAL
MDC_IDC_STAT_TACHYTHERAPY_RECENT_DTM_START: NORMAL
MDC_IDC_STAT_TACHYTHERAPY_SHOCKS_ABORTED_RECENT: 0
MDC_IDC_STAT_TACHYTHERAPY_SHOCKS_ABORTED_TOTAL: 0
MDC_IDC_STAT_TACHYTHERAPY_SHOCKS_DELIVERED_RECENT: 0
MDC_IDC_STAT_TACHYTHERAPY_SHOCKS_DELIVERED_TOTAL: 1
MDC_IDC_STAT_TACHYTHERAPY_TOTAL_DTM_END: NORMAL
MDC_IDC_STAT_TACHYTHERAPY_TOTAL_DTM_START: NORMAL

## 2022-01-19 ENCOUNTER — LAB (OUTPATIENT)
Dept: LAB | Facility: CLINIC | Age: 72
End: 2022-01-19
Payer: MEDICARE

## 2022-01-19 DIAGNOSIS — I50.22 CHRONIC SYSTOLIC CONGESTIVE HEART FAILURE (H): ICD-10-CM

## 2022-01-19 LAB
ANION GAP SERPL CALCULATED.3IONS-SCNC: 6 MMOL/L (ref 3–14)
BUN SERPL-MCNC: 21 MG/DL (ref 7–30)
CALCIUM SERPL-MCNC: 9.3 MG/DL (ref 8.5–10.1)
CHLORIDE BLD-SCNC: 101 MMOL/L (ref 94–109)
CO2 SERPL-SCNC: 28 MMOL/L (ref 20–32)
CREAT SERPL-MCNC: 0.76 MG/DL (ref 0.66–1.25)
GFR SERPL CREATININE-BSD FRML MDRD: >90 ML/MIN/1.73M2
GLUCOSE BLD-MCNC: 136 MG/DL (ref 70–99)
POTASSIUM BLD-SCNC: 4.2 MMOL/L (ref 3.4–5.3)
SODIUM SERPL-SCNC: 135 MMOL/L (ref 133–144)

## 2022-01-19 PROCEDURE — 80048 BASIC METABOLIC PNL TOTAL CA: CPT

## 2022-01-19 PROCEDURE — 36415 COLL VENOUS BLD VENIPUNCTURE: CPT

## 2022-01-19 NOTE — RESULT ENCOUNTER NOTE
Will review or did review during clinic visit.  Please see progress note for plan.  Danika Ye PA-C 1/19/2022 4:57 PM

## 2022-01-20 ENCOUNTER — OFFICE VISIT (OUTPATIENT)
Dept: CARDIOLOGY | Facility: CLINIC | Age: 72
End: 2022-01-20
Attending: PHYSICIAN ASSISTANT
Payer: MEDICARE

## 2022-01-20 VITALS
HEART RATE: 82 BPM | WEIGHT: 183.6 LBS | HEIGHT: 67 IN | SYSTOLIC BLOOD PRESSURE: 136 MMHG | DIASTOLIC BLOOD PRESSURE: 68 MMHG | OXYGEN SATURATION: 98 % | BODY MASS INDEX: 28.82 KG/M2

## 2022-01-20 DIAGNOSIS — I50.22 CHRONIC SYSTOLIC CONGESTIVE HEART FAILURE (H): ICD-10-CM

## 2022-01-20 DIAGNOSIS — I25.5 ISCHEMIC CARDIOMYOPATHY: ICD-10-CM

## 2022-01-20 PROCEDURE — 99215 OFFICE O/P EST HI 40 MIN: CPT | Performed by: PHYSICIAN ASSISTANT

## 2022-01-20 RX ORDER — FUROSEMIDE 40 MG
20 TABLET ORAL DAILY
Qty: 90 TABLET | Refills: 3 | Status: SHIPPED | OUTPATIENT
Start: 2022-01-20 | End: 2022-12-13

## 2022-01-20 RX ORDER — SACUBITRIL AND VALSARTAN 24; 26 MG/1; MG/1
1 TABLET, FILM COATED ORAL 2 TIMES DAILY
Qty: 60 TABLET | Refills: 11 | Status: SHIPPED | OUTPATIENT
Start: 2022-01-20 | End: 2022-02-15

## 2022-01-20 ASSESSMENT — MIFFLIN-ST. JEOR: SCORE: 1536.43

## 2022-01-20 NOTE — PATIENT INSTRUCTIONS
Call CORE nurse for any questions or concerns Mon-Fri 8am-4pm:                                                #[phone]                                       For concerns after hours:                                               #[phone]     1: Medication changes: stop taking lisinopril.   On Sunday morning start taking Entresto 24/26 mg twice a day.  We'll try it for a month, if it seems worth the money, we'll continue it.  If not we can stop it.    Decrease Lasix/ furosemide to 1/2 pill, this is 20 mg once a day.      2: Plan from today: I'll see you back in about 1 month with labs before that visit.      3: Lab results: see attached; labs are completely normal.    Component      Latest Ref Rng & Units 1/19/2022   Sodium      133 - 144 mmol/L 135   Potassium      3.4 - 5.3 mmol/L 4.2   Chloride      94 - 109 mmol/L 101   Carbon Dioxide      20 - 32 mmol/L 28   Anion Gap      3 - 14 mmol/L 6   Urea Nitrogen      7 - 30 mg/dL 21   Creatinine      0.66 - 1.25 mg/dL 0.76   Calcium      8.5 - 10.1 mg/dL 9.3   Glucose      70 - 99 mg/dL 136 (H)   GFR Estimate      >60 mL/min/1.73m2 >90

## 2022-01-20 NOTE — PROGRESS NOTES
Service Date: 2022    CLINIC VISIT    PRIMARY CARDIOLOGIST:  Dr. Santiago    REASON FOR VISIT:  C.O.R.E. Clinic followup.    HISTORY OF PRESENT ILLNESS:  Mr. Arguelles is a delightful 71-year-old gentleman with a long history of coronary artery disease.  Followed briefly by Dr. Hurtado and then by Dr. Alva at [facility] and now switching back to [place], seeing Dr. Marroquin initially and will be seeing Dr. Santiago here as his primary cardiologist.  His past cardiac history significant for the followin.  Large anterior MI in  with an occluded LAD and severe circumflex lesion on coronary angiogram, unable to complete PCI.  He underwent a 2-vessel CABG at that time, I believe, by Dr. Vera.  I am unable to access the actual records of the surgery.  2.  Longstanding cardiomyopathy and chronic systolic heart failure with an EF of 30% on echocardiogram, most recently 25%-30%.  3.  Wide complex tachycardia after his MI with decreased EF and implant of ICD in  with last gen change, I believe, around .  4.  Previously on anticoagulation with review of notes post MI stating, given his low EF, they wanted him on anticoagulation for 6 months.  He has no diagnosis of AFib, PE, DVT or LV thrombus.  Stopped at the last visit.  5.  Hypertension.  6.  Hyperlipidemia.  7.  Remote history of smoking, quit at the time of his MI.  8.  ICD in place with history of appropriate ICD shock.  9.  Moderate-to-severe mitral regurgitation and mild-to-moderate tricuspid regurgitation.    I met him about 6 weeks ago and he was on Lopressor had not been on spironolactone and was still on Coumadin.  We sorted through things and he had noted he had been on the same medications essentially since his heart attack and we worked on optimizing them a bit.  We got some pricing on Entresto and that will be about $500 for the first month but less after that.  He was not sure he wanted to do that.  We did start him on spironolactone and  switched him over to Toprol.    He is here for followup of that.  He continues to feel well.  He did drop about 8-10 pounds with these changes.  He was previously running between 182-184 pounds at home.  He is now down to 174 pounds.  He continues to feel well.  He denies chest pain, shortness of breath, orthopnea or PND.  He is back during his treadmill and is doing about 35 minutes at 3.6 mile-per-hour pace.  This is not as good as it was before but it is fairly reasonable for him.  He does note that he is up more at night, going to the bathroom.  This is just in the last week or so.  This about every 2 hours and he can fall back asleep.  He has been shoveling snow and has no issues with that related chest pain, syncope, presyncope, or severe shortness of breath.    SOCIAL HISTORY:  He is .  His wife has COPD so they have other expensive medications.  He is a retired  and notes that initially when he presented with symptoms in 1997, they were misinterpreted as musculoskeletal stress due to his workload.  He likely was having an infarct for several days, if not weeks, before diagnosis.    PHYSICAL EXAMINATION:    GENERAL:  Well-developed, well-nourished gentleman in no acute distress.  HEENT:  Normocephalic, atraumatic.  HEART:  Regular in rate and rhythm with occasional early beats consistent with PACs or PVCs.  He has a 2/6 systolic murmur heard best at lower left sternal border.  LUNGS:  Clear without wheezes, rales or rhonchi.  EXTREMITIES:  Without peripheral edema.  Easily visible.  NECK:  Veins just visible above the clavicle with minimal positive hepatojugular reflux.    LABORATORY STUDIES:  Labs done yesterday show creatinine of 0.76, BUN 21, potassium 4.2, sodium 135.    ASSESSMENT AND PLAN:    1.  Ischemic cardiomyopathy, since the late 1990s with chronic systolic heart failure and EF 25%-30% with us working on optimizing his medications and getting him on the latest technology.  At this  point, we had a long discussion about the cost of Entresto and the possible benefits.  It looks like it will cost about $2000 total a year but $500 for the first month.  We discussed giving it a 1-month trial and if he feels like his symptoms are improved, he has more energy, it would be worth continuing but if at any point, he feels like it is too expensive for him and does not have a symptomatic benefit, we would switch him back to lisinopril.  He will otherwise remain on Toprol 200 mg daily and spironolactone 25 mg daily.  He does have fairly increased frequent urination at night which is new.  I suspect he has diuresed more than I anticipated on the spironolactone.  We will cut his furosemide back to 20 mg a day.  With Entresto, we may be able to discontinue it completely down the road.  Today, he has class II symptoms.  We would consider Jardiance down the road, although that will likely also be expensive, so we will need to visit that with our colleagues in pharmacy.  I am unclear how much reverse remodeling we will get given his large anterior MI many years ago.  2.  History of nonsustained VT as well as sustained VT with ICD in place with history of a shock.  Last device check was 12/20.  He had one 9-beat run of nonsustained VT noted and spontaneously converted.  No ATP, no shocks.  We will continue to follow closely.  He is on 200 of Toprol.  Does not need amiodarone at this time.  3.  Moderate-to-severe mitral regurgitation and tricuspid regurgitation, likely functional secondary to cardiomyopathy.  Hopefully, will improve with Entresto.  3.  Coronary artery disease with history of CABG x2.  Unclear vessels.  He is now back on aspirin and is also on statin.  It does sound like the aspirin may be giving him a little bit of an upset stomach.  I asked him to move it to morning and take it with food.  If it continues to bother him, would consider potentially Plavix in its place.  4.  Dyslipidemia, excellent  control.  5.  Hypertension, elevated today and slightly elevated on repeat.  We will continue to follow.  Should improve with Entresto.    Thank you for allowing me to participate in this delightful patient's care.  I will follow him up in 1 month, sooner with concerns.    Danika Ye PA-C        D: 2022   T: 2022   MT: julio    Name:     SHANA TREJOMina  MRN:      [id number]        Account:      [id number]   :      1950           Service Date: 2022       Document: [id number]

## 2022-01-20 NOTE — LETTER
1/20/2022       RE: Tae Arguelles  [address]  [facility] [address]     Dear Colleague,    Thank you for referring your patient, Tae Arguelles, to the [facility] at [facility]. Please see a copy of my visit note below.    [id number]      HPI and Plan:   See dictation    Orders this Visit:  Orders Placed This Encounter   Procedures     Basic metabolic panel     N terminal pro BNP outpatient     Follow-Up with Cardiology SHARIF Core     Orders Placed This Encounter   Medications     furosemide (LASIX) 40 MG tablet     Sig: Take 0.5 tablets (20 mg) by mouth daily     Dispense:  90 tablet     Refill:  3     sacubitril-valsartan (ENTRESTO) 24-26 MG per tablet     Sig: Take 1 tablet by mouth 2 times daily     Dispense:  60 tablet     Refill:  11     Medications Discontinued During This Encounter   Medication Reason     lisinopril (ZESTRIL) 10 MG tablet      furosemide (LASIX) 40 MG tablet          Encounter Diagnoses   Name Primary?     Chronic systolic congestive heart failure (H)      Ischemic cardiomyopathy        CURRENT MEDICATIONS:  Current Outpatient Medications   Medication Sig Dispense Refill     aspirin (ASA) 81 MG chewable tablet Take 1 tablet (81 mg) by mouth daily       atorvastatin (LIPITOR) 20 MG tablet Take 1 tablet (20 mg) by mouth daily 90 tablet 3     Cholecalciferol (VITAMIN D) 1000 UNITS capsule Take 1 capsule by mouth daily.       fluocinolone (SYNALAR) 0.01 % solution PRN 60 mL 1     furosemide (LASIX) 40 MG tablet Take 0.5 tablets (20 mg) by mouth daily 90 tablet 3     hydrocortisone (WESTCORT) 0.2 % external cream as needed 45 g 1     metoprolol succinate ER (TOPROL XL) 200 MG 24 hr tablet Take 1 tablet (200 mg) by mouth daily 90 tablet 3     multivitamin, therapeutic with minerals (MULTI-VITAMIN) TABS Take 1 tablet by mouth daily.       NIACIN 750 MG OR TBCR 2 tabs qhs       nitroGLYcerin (NITROSTAT) 0.4 MG  sublingual tablet Place 1 tablet (0.4 mg) under the tongue every 5 minutes as needed for chest pain 30 tablet 11     sacubitril-valsartan (ENTRESTO) 24-26 MG per tablet Take 1 tablet by mouth 2 times daily 60 tablet 11     spironolactone (ALDACTONE) 25 MG tablet Take 1 tablet (25 mg) by mouth daily 90 tablet 3       ALLERGIES     Allergies   Allergen Reactions     Septra [[facility]]        PAST MEDICAL HISTORY:  Past Medical History:   Diagnosis Date     BPH (benign prostatic hyperplasia)     Dr. Whitfield, PSA 0.27 2012     CAD (coronary artery disease)     MI      CHF (congestive heart failure) (H)      Colon polyp      History of smoking     Quit      HTN (hypertension), benign      Hyperlipidemia LDL goal <100      Ischemic cardiomyopathy     Dr. Alva     Lichen planus      Normal colonoscopy 1-3-08    Repeat 5-10 years     Pacemaker ,     ventricular tachycardia     Pleural effusion      Pneumonia      Pneumothorax     plus rib fx secondary to fall (L)     Seborrheic dermatitis      Toe fracture, right        PAST SURGICAL HISTORY:  Past Surgical History:   Procedure Laterality Date     COLONOSCOPY N/A 2015    Procedure: COLONOSCOPY;  Surgeon: Zac Austin MD;  Location:  GI     MOUTH SURGERY      Upper and lower dentures     [facility] CABG, ARTERIAL, TWO         FAMILY HISTORY:  Family History   Problem Relation Age of Onset     Diabetes Father      Cancer Father 73        lung and bone ca     Diabetes Sister      Kidney Cancer Sister 77     Breast Cancer Sister      Uterine Cancer Sister      Hypertension Mother      Cerebrovascular Disease Mother          age 75     Thyroid Disease Mother      Kidney Cancer Brother      Diabetes Brother      Heart Defect Brother        SOCIAL HISTORY:  Social History     Socioeconomic History     Marital status:      Spouse name: None     Number of children: None     Years of education: None     Highest education  "level: None   Occupational History     None   Tobacco Use     Smoking status: Former Smoker     Quit date: 10/23/1997     Years since quittin.2     Smokeless tobacco: Never Used   Substance and Sexual Activity     Alcohol use: Yes     Alcohol/week: 0.0 standard drinks     Comment: 7 beers a week     Drug use: No     Sexual activity: Yes     Partners: Female   Other Topics Concern     Parent/sibling w/ CABG, MI or angioplasty before 65F 55M? Not Asked   Social History Narrative    , wife Nadira.     Social Determinants of Health     Financial Resource Strain: Not on file   Food Insecurity: Not on file   Transportation Needs: Not on file   Physical Activity: Not on file   Stress: Not on file   Social Connections: Not on file   Intimate Partner Violence: Not on file   Housing Stability: Not on file       Review of Systems:  Skin:  Negative     Eyes:  Negative glasses  ENT:  Negative    Respiratory:  Negative shortness of breath;dyspnea on exertion;sleep apnea  Cardiovascular:  Negative    Gastroenterology: Negative heartburn  Genitourinary:  Negative    Musculoskeletal:  Negative back pain  Neurologic:  Negative migraine headaches;headaches  Psychiatric:  Negative sleep disturbances;anxiety  Heme/Lymph/Imm:  Negative night sweats;hay fever  Endocrine:  Negative thyroid disorder;diabetes    Physical Exam:  Vitals: /68   Pulse 82   Ht 1.702 m (5' 7\")   Wt 83.3 kg (183 lb 9.6 oz)   SpO2 98%   BMI 28.76 kg/m     Please refer to dictation for physical exam    Recent Lab Results: all reviewed today  CBC RESULTS:  Lab Results   Component Value Date    WBC 8.6 2021    WBC 7.1 2019    RBC 4.07 (L) 2021    RBC 3.90 (L) 2019    HGB 14.5 2021    HGB 14.4 2019    HCT 42.8 2021    HCT 41.3 2019     (H) 2021     (H) 2019    MCH 35.6 (H) 2021    MCH 36.9 (H) 2019    MCHC 33.9 2021    MCHC 34.9 2019    RDW 11.9 " 2021    RDW 11.4 2019     (L) 2021     (L) 2019       BMP RESULTS:  Lab Results   Component Value Date     2022     2019    POTASSIUM 4.2 2022    POTASSIUM 4.2 2021    CHLORIDE 101 2022    CHLORIDE 103 2019    CO2 28 2022    CO2 25 2019    ANIONGAP 6 2022    ANIONGAP 7 2019     (H) 2022     (A) 2021    BUN 21 2022    BUN 17 2019    CR 0.76 2022    CR 0.77 2021    GFRESTIMATED >90 2022    GFRESTIMATED >60 2021    GFRESTBLACK >60 2021    SALOME 9.3 2022    SALOME 9.2 2019        INR RESULTS:  Lab Results   Component Value Date    INR 3.0 (H) 2021    INR 3.2 (H) 2021    INR 2.00 (H) 2021    INR 2.10 (H) 06/15/2021           CC  Danika Ye PA-C  [address]  [place] [address]        Service Date: 2022    CLINIC VISIT    PRIMARY CARDIOLOGIST:  Dr. Santiago    REASON FOR VISIT:  C.O.R.E. Clinic followup.    HISTORY OF PRESENT ILLNESS:  Mr. Arguelles is a delightful 71-year-old gentleman with a long history of coronary artery disease.  Followed briefly by Dr. Hurtado and then by Dr. Alva at [facility] and now switching back to [facility], seeing Dr. Marroquin initially and will be seeing Dr. Santiago here as his primary cardiologist.  His past cardiac history significant for the followin.  Large anterior MI in  with an occluded LAD and severe circumflex lesion on coronary angiogram, unable to complete PCI.  He underwent a 2-vessel CABG at that time, I believe, by Dr. Vera.  I am unable to access the actual records of the surgery.  2.  Longstanding cardiomyopathy and chronic systolic heart failure with an EF of 30% on echocardiogram, most recently 25%-30%.  3.  Wide complex tachycardia after his MI with decreased EF and implant of ICD in  with last gen change, I believe, around .  4.   Previously on anticoagulation with review of notes post MI stating, given his low EF, they wanted him on anticoagulation for 6 months.  He has no diagnosis of AFib, PE, DVT or LV thrombus.  Stopped at the last visit.  5.  Hypertension.  6.  Hyperlipidemia.  7.  Remote history of smoking, quit at the time of his MI.  8.  ICD in place with history of appropriate ICD shock.  9.  Moderate-to-severe mitral regurgitation and mild-to-moderate tricuspid regurgitation.    I met him about 6 weeks ago and he was on Lopressor had not been on spironolactone and was still on Coumadin.  We sorted through things and he had noted he had been on the same medications essentially since his heart attack and we worked on optimizing them a bit.  We got some pricing on Entresto and that will be about $500 for the first month but less after that.  He was not sure he wanted to do that.  We did start him on spironolactone and switched him over to Toprol.    He is here for followup of that.  He continues to feel well.  He did drop about 8-10 pounds with these changes.  He was previously running between 182-184 pounds at home.  He is now down to 174 pounds.  He continues to feel well.  He denies chest pain, shortness of breath, orthopnea or PND.  He is back during his treadmill and is doing about 35 minutes at 3.6 mile-per-hour pace.  This is not as good as it was before but it is fairly reasonable for him.  He does note that he is up more at night, going to the bathroom.  This is just in the last week or so.  This about every 2 hours and he can fall back asleep.  He has been shoveling snow and has no issues with that related chest pain, syncope, presyncope, or severe shortness of breath.    SOCIAL HISTORY:  He is .  His wife has COPD so they have other expensive medications.  He is a retired  and notes that initially when he presented with symptoms in 1997, they were misinterpreted as musculoskeletal stress due to his  workload.  He likely was having an infarct for several days, if not weeks, before diagnosis.    PHYSICAL EXAMINATION:    GENERAL:  Well-developed, well-nourished gentleman in no acute distress.  HEENT:  Normocephalic, atraumatic.  HEART:  Regular in rate and rhythm with occasional early beats consistent with PACs or PVCs.  He has a 2/6 systolic murmur heard best at lower left sternal border.  LUNGS:  Clear without wheezes, rales or rhonchi.  EXTREMITIES:  Without peripheral edema.  Easily visible.  NECK:  Veins just visible above the clavicle with minimal positive hepatojugular reflux.    LABORATORY STUDIES:  Labs done yesterday show creatinine of 0.76, BUN 21, potassium 4.2, sodium 135.    ASSESSMENT AND PLAN:    1.  Ischemic cardiomyopathy, since the late 1990s with chronic systolic heart failure and EF 25%-30% with us working on optimizing his medications and getting him on the latest technology.  At this point, we had a long discussion about the cost of Entresto and the possible benefits.  It looks like it will cost about $2000 total a year but $500 for the first month.  We discussed giving it a 1-month trial and if he feels like his symptoms are improved, he has more energy, it would be worth continuing but if at any point, he feels like it is too expensive for him and does not have a symptomatic benefit, we would switch him back to lisinopril.  He will otherwise remain on Toprol 200 mg daily and spironolactone 25 mg daily.  He does have fairly increased frequent urination at night which is new.  I suspect he has diuresed more than I anticipated on the spironolactone.  We will cut his furosemide back to 20 mg a day.  With Entresto, we may be able to discontinue it completely down the road.  Today, he has class II symptoms.  We would consider Jardiance down the road, although that will likely also be expensive, so we will need to visit that with our colleagues in pharmacy.  I am unclear how much reverse  remodeling we will get given his large anterior MI many years ago.  2.  History of nonsustained VT as well as sustained VT with ICD in place with history of a shock.  Last device check was .  He had one 9-beat run of nonsustained VT noted and spontaneously converted.  No ATP, no shocks.  We will continue to follow closely.  He is on 200 of Toprol.  Does not need amiodarone at this time.  3.  Moderate-to-severe mitral regurgitation and tricuspid regurgitation, likely functional secondary to cardiomyopathy.  Hopefully, will improve with Entresto.  3.  Coronary artery disease with history of CABG x2.  Unclear vessels.  He is now back on aspirin and is also on statin.  It does sound like the aspirin may be giving him a little bit of an upset stomach.  I asked him to move it to morning and take it with food.  If it continues to bother him, would consider potentially Plavix in its place.  4.  Dyslipidemia, excellent control.  5.  Hypertension, elevated today and slightly elevated on repeat.  We will continue to follow.  Should improve with Entresto.    Thank you for allowing me to participate in this delightful patient's care.  I will follow him up in 1 month, sooner with concerns.      Danika Ye PA-C        D: 2022   T: 2022   MT: julio    Name:     SHANA TREJO  MRN:      [id number]        Account:      [id number]   :      1950           Service Date: 2022       Document: [id number]

## 2022-01-20 NOTE — PROGRESS NOTES
[id number]      HPI and Plan:   See dictation    Orders this Visit:  Orders Placed This Encounter   Procedures     Basic metabolic panel     N terminal pro BNP outpatient     Follow-Up with Cardiology SHARIF Core     Orders Placed This Encounter   Medications     furosemide (LASIX) 40 MG tablet     Sig: Take 0.5 tablets (20 mg) by mouth daily     Dispense:  90 tablet     Refill:  3     sacubitril-valsartan (ENTRESTO) 24-26 MG per tablet     Sig: Take 1 tablet by mouth 2 times daily     Dispense:  60 tablet     Refill:  11     Medications Discontinued During This Encounter   Medication Reason     lisinopril (ZESTRIL) 10 MG tablet      furosemide (LASIX) 40 MG tablet          Encounter Diagnoses   Name Primary?     Chronic systolic congestive heart failure (H)      Ischemic cardiomyopathy        CURRENT MEDICATIONS:  Current Outpatient Medications   Medication Sig Dispense Refill     aspirin (ASA) 81 MG chewable tablet Take 1 tablet (81 mg) by mouth daily       atorvastatin (LIPITOR) 20 MG tablet Take 1 tablet (20 mg) by mouth daily 90 tablet 3     Cholecalciferol (VITAMIN D) 1000 UNITS capsule Take 1 capsule by mouth daily.       fluocinolone (SYNALAR) 0.01 % solution PRN 60 mL 1     furosemide (LASIX) 40 MG tablet Take 0.5 tablets (20 mg) by mouth daily 90 tablet 3     hydrocortisone (WESTCORT) 0.2 % external cream as needed 45 g 1     metoprolol succinate ER (TOPROL XL) 200 MG 24 hr tablet Take 1 tablet (200 mg) by mouth daily 90 tablet 3     multivitamin, therapeutic with minerals (MULTI-VITAMIN) TABS Take 1 tablet by mouth daily.       NIACIN 750 MG OR TBCR 2 tabs qhs       nitroGLYcerin (NITROSTAT) 0.4 MG sublingual tablet Place 1 tablet (0.4 mg) under the tongue every 5 minutes as needed for chest pain 30 tablet 11     sacubitril-valsartan (ENTRESTO) 24-26 MG per tablet Take 1 tablet by mouth 2 times daily 60 tablet 11     spironolactone (ALDACTONE) 25 MG tablet Take 1 tablet (25 mg) by mouth daily 90 tablet 3        ALLERGIES     Allergies   Allergen Reactions     Septra [[facility]]        PAST MEDICAL HISTORY:  Past Medical History:   Diagnosis Date     BPH (benign prostatic hyperplasia)     Dr. Whitfield, PSA 0.27 2012     CAD (coronary artery disease)     MI      CHF (congestive heart failure) (H)      Colon polyp      History of smoking     Quit      HTN (hypertension), benign      Hyperlipidemia LDL goal <100      Ischemic cardiomyopathy     Dr. Alva     Lichen planus      Normal colonoscopy 1-3-08    Repeat 5-10 years     Pacemaker ,     ventricular tachycardia     Pleural effusion      Pneumonia      Pneumothorax     plus rib fx secondary to fall (L)     Seborrheic dermatitis      Toe fracture, right        PAST SURGICAL HISTORY:  Past Surgical History:   Procedure Laterality Date     COLONOSCOPY N/A 2015    Procedure: COLONOSCOPY;  Surgeon: Zac Austin MD;  Location:  GI     MOUTH SURGERY      Upper and lower dentures     ZZC CABG, ARTERIAL, TWO         FAMILY HISTORY:  Family History   Problem Relation Age of Onset     Diabetes Father      Cancer Father 73        lung and bone ca     Diabetes Sister      Kidney Cancer Sister 77     Breast Cancer Sister      Uterine Cancer Sister      Hypertension Mother      Cerebrovascular Disease Mother          age 75     Thyroid Disease Mother      Kidney Cancer Brother      Diabetes Brother      Heart Defect Brother        SOCIAL HISTORY:  Social History     Socioeconomic History     Marital status:      Spouse name: None     Number of children: None     Years of education: None     Highest education level: None   Occupational History     None   Tobacco Use     Smoking status: Former Smoker     Quit date: 10/23/1997     Years since quittin.2     Smokeless tobacco: Never Used   Substance and Sexual Activity     Alcohol use: Yes     Alcohol/week: 0.0 standard drinks     Comment: 7 beers a week     Drug use:  "No     Sexual activity: Yes     Partners: Female   Other Topics Concern     Parent/sibling w/ CABG, MI or angioplasty before 65F 55M? Not Asked   Social History Narrative    , wife Nadira.     Social Determinants of Health     Financial Resource Strain: Not on file   Food Insecurity: Not on file   Transportation Needs: Not on file   Physical Activity: Not on file   Stress: Not on file   Social Connections: Not on file   Intimate Partner Violence: Not on file   Housing Stability: Not on file       Review of Systems:  Skin:  Negative     Eyes:  Negative glasses  ENT:  Negative    Respiratory:  Negative shortness of breath;dyspnea on exertion;sleep apnea  Cardiovascular:  Negative    Gastroenterology: Negative heartburn  Genitourinary:  Negative    Musculoskeletal:  Negative back pain  Neurologic:  Negative migraine headaches;headaches  Psychiatric:  Negative sleep disturbances;anxiety  Heme/Lymph/Imm:  Negative night sweats;hay fever  Endocrine:  Negative thyroid disorder;diabetes    Physical Exam:  Vitals: /68   Pulse 82   Ht 1.702 m (5' 7\")   Wt 83.3 kg (183 lb 9.6 oz)   SpO2 98%   BMI 28.76 kg/m     Please refer to dictation for physical exam    Recent Lab Results: all reviewed today  CBC RESULTS:  Lab Results   Component Value Date    WBC 8.6 12/08/2021    WBC 7.1 11/13/2019    RBC 4.07 (L) 12/08/2021    RBC 3.90 (L) 11/13/2019    HGB 14.5 12/08/2021    HGB 14.4 11/13/2019    HCT 42.8 12/08/2021    HCT 41.3 11/13/2019     (H) 12/08/2021     (H) 11/13/2019    MCH 35.6 (H) 12/08/2021    MCH 36.9 (H) 11/13/2019    MCHC 33.9 12/08/2021    MCHC 34.9 11/13/2019    RDW 11.9 12/08/2021    RDW 11.4 11/13/2019     (L) 12/08/2021     (L) 11/13/2019       BMP RESULTS:  Lab Results   Component Value Date     01/19/2022     11/13/2019    POTASSIUM 4.2 01/19/2022    POTASSIUM 4.2 01/06/2021    CHLORIDE 101 01/19/2022    CHLORIDE 103 11/13/2019    CO2 28 01/19/2022    CO2 " 25 11/13/2019    ANIONGAP 6 01/19/2022    ANIONGAP 7 11/13/2019     (H) 01/19/2022     (A) 01/06/2021    BUN 21 01/19/2022    BUN 17 11/13/2019    CR 0.76 01/19/2022    CR 0.77 01/06/2021    GFRESTIMATED >90 01/19/2022    GFRESTIMATED >60 01/06/2021    GFRESTBLACK >60 01/06/2021    SALOME 9.3 01/19/2022    SALOME 9.2 11/13/2019        INR RESULTS:  Lab Results   Component Value Date    INR 3.0 (H) 11/16/2021    INR 3.2 (H) 11/02/2021    INR 2.00 (H) 06/29/2021    INR 2.10 (H) 06/15/2021           WILLA Ye PA-C  [address]  [place] [id number]

## 2022-01-20 NOTE — LETTER
1/20/2022    Janet Alcaraz PA-C  [address]  [place] [address]    RE: Tae Arguelles       Dear Colleague,     I had the pleasure of seeing Tae Arguelles in the [facility].  [id number]  {2021 E&M time:[id number]}    HPI and Plan:   See dictation    Orders this Visit:  Orders Placed This Encounter   Procedures     Basic metabolic panel     N terminal pro BNP outpatient     Follow-Up with Cardiology SHARIF Core     Orders Placed This Encounter   Medications     furosemide (LASIX) 40 MG tablet     Sig: Take 0.5 tablets (20 mg) by mouth daily     Dispense:  90 tablet     Refill:  3     sacubitril-valsartan (ENTRESTO) 24-26 MG per tablet     Sig: Take 1 tablet by mouth 2 times daily     Dispense:  60 tablet     Refill:  11     Medications Discontinued During This Encounter   Medication Reason     lisinopril (ZESTRIL) 10 MG tablet      furosemide (LASIX) 40 MG tablet          Encounter Diagnoses   Name Primary?     Chronic systolic congestive heart failure (H)      Ischemic cardiomyopathy        CURRENT MEDICATIONS:  Current Outpatient Medications   Medication Sig Dispense Refill     aspirin (ASA) 81 MG chewable tablet Take 1 tablet (81 mg) by mouth daily       atorvastatin (LIPITOR) 20 MG tablet Take 1 tablet (20 mg) by mouth daily 90 tablet 3     Cholecalciferol (VITAMIN D) 1000 UNITS capsule Take 1 capsule by mouth daily.       fluocinolone (SYNALAR) 0.01 % solution PRN 60 mL 1     furosemide (LASIX) 40 MG tablet Take 0.5 tablets (20 mg) by mouth daily 90 tablet 3     hydrocortisone (WESTCORT) 0.2 % external cream as needed 45 g 1     metoprolol succinate ER (TOPROL XL) 200 MG 24 hr tablet Take 1 tablet (200 mg) by mouth daily 90 tablet 3     multivitamin, therapeutic with minerals (MULTI-VITAMIN) TABS Take 1 tablet by mouth daily.       NIACIN 750 MG OR TBCR 2 tabs qhs       nitroGLYcerin (NITROSTAT) 0.4 MG sublingual tablet Place 1 tablet (0.4 mg) under the tongue every 5 minutes as  needed for chest pain 30 tablet 11     sacubitril-valsartan (ENTRESTO) 24-26 MG per tablet Take 1 tablet by mouth 2 times daily 60 tablet 11     spironolactone (ALDACTONE) 25 MG tablet Take 1 tablet (25 mg) by mouth daily 90 tablet 3       ALLERGIES     Allergies   Allergen Reactions     Septra [[facility]]        PAST MEDICAL HISTORY:  Past Medical History:   Diagnosis Date     BPH (benign prostatic hyperplasia)     Dr. Whitfield, PSA 0.27 2012     CAD (coronary artery disease)     MI      CHF (congestive heart failure) (H)      Colon polyp      History of smoking     Quit      HTN (hypertension), benign      Hyperlipidemia LDL goal <100      Ischemic cardiomyopathy     Dr. Alva     Lichen planus      Normal colonoscopy 1-3-08    Repeat 5-10 years     Pacemaker ,     ventricular tachycardia     Pleural effusion      Pneumonia      Pneumothorax     plus rib fx secondary to fall (L)     Seborrheic dermatitis      Toe fracture, right        PAST SURGICAL HISTORY:  Past Surgical History:   Procedure Laterality Date     COLONOSCOPY N/A 2015    Procedure: COLONOSCOPY;  Surgeon: Zac Austin MD;  Location:  GI     MOUTH SURGERY      Upper and lower dentures     [facility] CABG, ARTERIAL, TWO         FAMILY HISTORY:  Family History   Problem Relation Age of Onset     Diabetes Father      Cancer Father 73        lung and bone ca     Diabetes Sister      Kidney Cancer Sister 77     Breast Cancer Sister      Uterine Cancer Sister      Hypertension Mother      Cerebrovascular Disease Mother          age 75     Thyroid Disease Mother      Kidney Cancer Brother      Diabetes Brother      Heart Defect Brother        SOCIAL HISTORY:  Social History     Socioeconomic History     Marital status:      Spouse name: None     Number of children: None     Years of education: None     Highest education level: None   Occupational History     None   Tobacco Use     Smoking status:  "Former Smoker     Quit date: 10/23/1997     Years since quittin.2     Smokeless tobacco: Never Used   Substance and Sexual Activity     Alcohol use: Yes     Alcohol/week: 0.0 standard drinks     Comment: 7 beers a week     Drug use: No     Sexual activity: Yes     Partners: Female   Other Topics Concern     Parent/sibling w/ CABG, MI or angioplasty before 65F 55M? Not Asked   Social History Narrative    , wife Nadira.     Social Determinants of Health     Financial Resource Strain: Not on file   Food Insecurity: Not on file   Transportation Needs: Not on file   Physical Activity: Not on file   Stress: Not on file   Social Connections: Not on file   Intimate Partner Violence: Not on file   Housing Stability: Not on file       Review of Systems:  Skin:  Negative     Eyes:  Negative glasses  ENT:  Negative    Respiratory:  Negative shortness of breath;dyspnea on exertion;sleep apnea  Cardiovascular:  Negative    Gastroenterology: Negative heartburn  Genitourinary:  Negative    Musculoskeletal:  Negative back pain  Neurologic:  Negative migraine headaches;headaches  Psychiatric:  Negative sleep disturbances;anxiety  Heme/Lymph/Imm:  Negative night sweats;hay fever  Endocrine:  Negative thyroid disorder;diabetes    Physical Exam:  Vitals: /68   Pulse 82   Ht 1.702 m (5' 7\")   Wt 83.3 kg (183 lb 9.6 oz)   SpO2 98%   BMI 28.76 kg/m     Please refer to dictation for physical exam    Recent Lab Results: all reviewed today  CBC RESULTS:  Lab Results   Component Value Date    WBC 8.6 2021    WBC 7.1 2019    RBC 4.07 (L) 2021    RBC 3.90 (L) 2019    HGB 14.5 2021    HGB 14.4 2019    HCT 42.8 2021    HCT 41.3 2019     (H) 2021     (H) 2019    MCH 35.6 (H) 2021    MCH 36.9 (H) 2019    MCHC 33.9 2021    MCHC 34.9 2019    RDW 11.9 2021    RDW 11.4 2019     (L) 2021     (L) " 11/13/2019       BMP RESULTS:  Lab Results   Component Value Date     01/19/2022     11/13/2019    POTASSIUM 4.2 01/19/2022    POTASSIUM 4.2 01/06/2021    CHLORIDE 101 01/19/2022    CHLORIDE 103 11/13/2019    CO2 28 01/19/2022    CO2 25 11/13/2019    ANIONGAP 6 01/19/2022    ANIONGAP 7 11/13/2019     (H) 01/19/2022     (A) 01/06/2021    BUN 21 01/19/2022    BUN 17 11/13/2019    CR 0.76 01/19/2022    CR 0.77 01/06/2021    GFRESTIMATED >90 01/19/2022    GFRESTIMATED >60 01/06/2021    GFRESTBLACK >60 01/06/2021    SALOME 9.3 01/19/2022    SALOME 9.2 11/13/2019        INR RESULTS:  Lab Results   Component Value Date    INR 3.0 (H) 11/16/2021    INR 3.2 (H) 11/02/2021    INR 2.00 (H) 06/29/2021    INR 2.10 (H) 06/15/2021           CC  Danika Ye PA-C  [address]  [place] [address]          Thank you for allowing me to participate in the care of your patient.      Sincerely,     Danika Ye PA-C     [facility]  cc:   Danika Ye PA-C  [address]  [place] [id number]

## 2022-02-15 DIAGNOSIS — I50.22 CHRONIC SYSTOLIC CONGESTIVE HEART FAILURE (H): ICD-10-CM

## 2022-02-15 RX ORDER — SACUBITRIL AND VALSARTAN 24; 26 MG/1; MG/1
1 TABLET, FILM COATED ORAL 2 TIMES DAILY
Qty: 20 TABLET | Refills: 0 | Status: SHIPPED | OUTPATIENT
Start: 2022-02-15 | End: 2022-02-28

## 2022-02-15 NOTE — TELEPHONE ENCOUNTER
[facility]    -Received message from Tae stating he was worried about the cost of refilling his Entresto if his labs were abnormal on 2/24.  He will run out of the medication on 2/21 and has a follow-up appointment with Danika Ye on 2/28.  Will send 10 supply to get him to his appointment with Danika. Sent to [facility]      Future Appointments   Date Time Provider Department Center   2/24/2022 10:15 AM [facility]    2/28/2022  3:10 [facility]   3/24/2022 12:00 AM [facility]       Donita Oro RN on 2/15/2022 at 3:48 PM

## 2022-02-24 ENCOUNTER — LAB (OUTPATIENT)
Dept: LAB | Facility: CLINIC | Age: 72
End: 2022-02-24
Payer: MEDICARE

## 2022-02-24 DIAGNOSIS — I50.22 CHRONIC SYSTOLIC CONGESTIVE HEART FAILURE (H): ICD-10-CM

## 2022-02-24 LAB
ANION GAP SERPL CALCULATED.3IONS-SCNC: 6 MMOL/L (ref 3–14)
BUN SERPL-MCNC: 13 MG/DL (ref 7–30)
CALCIUM SERPL-MCNC: 9.5 MG/DL (ref 8.5–10.1)
CHLORIDE BLD-SCNC: 106 MMOL/L (ref 94–109)
CO2 SERPL-SCNC: 26 MMOL/L (ref 20–32)
CREAT SERPL-MCNC: 0.75 MG/DL (ref 0.66–1.25)
GFR SERPL CREATININE-BSD FRML MDRD: >90 ML/MIN/1.73M2
GLUCOSE BLD-MCNC: 128 MG/DL (ref 70–99)
NT-PROBNP SERPL-MCNC: 566 PG/ML (ref 0–125)
POTASSIUM BLD-SCNC: 3.9 MMOL/L (ref 3.4–5.3)
SODIUM SERPL-SCNC: 138 MMOL/L (ref 133–144)

## 2022-02-24 PROCEDURE — 36415 COLL VENOUS BLD VENIPUNCTURE: CPT

## 2022-02-24 PROCEDURE — 80048 BASIC METABOLIC PNL TOTAL CA: CPT

## 2022-02-24 PROCEDURE — 83880 ASSAY OF NATRIURETIC PEPTIDE: CPT

## 2022-02-24 NOTE — RESULT ENCOUNTER NOTE
Will review or did review during clinic visit.  Please see progress note for plan.  Danika Ye PA-C 2/24/2022 2:59 PM

## 2022-02-24 NOTE — RESULT ENCOUNTER NOTE
Will review or did review during clinic visit.  Please see progress note for plan.  Danika Ye PA-C 2/24/2022 2:37 PM

## 2022-02-28 ENCOUNTER — OFFICE VISIT (OUTPATIENT)
Dept: CARDIOLOGY | Facility: CLINIC | Age: 72
End: 2022-02-28
Attending: PHYSICIAN ASSISTANT
Payer: MEDICARE

## 2022-02-28 VITALS
HEIGHT: 68 IN | SYSTOLIC BLOOD PRESSURE: 136 MMHG | BODY MASS INDEX: 28.01 KG/M2 | DIASTOLIC BLOOD PRESSURE: 58 MMHG | WEIGHT: 184.8 LBS | HEART RATE: 76 BPM | OXYGEN SATURATION: 97 %

## 2022-02-28 DIAGNOSIS — R23.3 EASY BRUISING: Primary | ICD-10-CM

## 2022-02-28 DIAGNOSIS — I50.22 CHRONIC SYSTOLIC CONGESTIVE HEART FAILURE (H): ICD-10-CM

## 2022-02-28 PROCEDURE — 99214 OFFICE O/P EST MOD 30 MIN: CPT | Performed by: PHYSICIAN ASSISTANT

## 2022-02-28 RX ORDER — SACUBITRIL AND VALSARTAN 49; 51 MG/1; MG/1
1 TABLET, FILM COATED ORAL 2 TIMES DAILY
Qty: 60 TABLET | Refills: 11 | Status: SHIPPED | OUTPATIENT
Start: 2022-02-28 | End: 2022-03-28

## 2022-02-28 NOTE — LETTER
2/28/2022    Janet Alcaraz PA-C  [address]  [place] [address]    RE: Tae Arguelles       Dear Colleague,     I had the pleasure of seeing Tae Arguelles in the [facility].      HPI and Plan:       Orders this Visit:  Orders Placed This Encounter   Procedures     CBC with platelets     CBC with platelets     Basic metabolic panel     Follow-Up with Cardiology SHARIF Core     Orders Placed This Encounter   Medications     sacubitril-valsartan (ENTRESTO) 49-51 MG per tablet     Sig: Take 1 tablet by mouth 2 times daily     Dispense:  60 tablet     Refill:  11     Medications Discontinued During This Encounter   Medication Reason     sacubitril-valsartan (ENTRESTO) 24-26 MG per tablet          Encounter Diagnoses   Name Primary?     Chronic systolic congestive heart failure (H)      Easy bruising Yes       CURRENT MEDICATIONS:  Current Outpatient Medications   Medication Sig Dispense Refill     aspirin (ASA) 81 MG chewable tablet Take 1 tablet (81 mg) by mouth daily       atorvastatin (LIPITOR) 20 MG tablet Take 1 tablet (20 mg) by mouth daily 90 tablet 3     Cholecalciferol (VITAMIN D) 1000 UNITS capsule Take 1 capsule by mouth daily.       fluocinolone (SYNALAR) 0.01 % solution PRN 60 mL 1     furosemide (LASIX) 40 MG tablet Take 0.5 tablets (20 mg) by mouth daily 90 tablet 3     hydrocortisone (WESTCORT) 0.2 % external cream as needed 45 g 1     metoprolol succinate ER (TOPROL XL) 200 MG 24 hr tablet Take 1 tablet (200 mg) by mouth daily 90 tablet 3     multivitamin, therapeutic with minerals (MULTI-VITAMIN) TABS Take 1 tablet by mouth daily.       NIACIN 750 MG OR TBCR 2 tabs qhs       nitroGLYcerin (NITROSTAT) 0.4 MG sublingual tablet Place 1 tablet (0.4 mg) under the tongue every 5 minutes as needed for chest pain 30 tablet 11     sacubitril-valsartan (ENTRESTO) 49-51 MG per tablet Take 1 tablet by mouth 2 times daily 60 tablet 11     spironolactone (ALDACTONE) 25 MG  tablet Take 1 tablet (25 mg) by mouth daily 90 tablet 3       ALLERGIES     Allergies   Allergen Reactions     Septra [S[facility]]        PAST MEDICAL HISTORY:  Past Medical History:   Diagnosis Date     BPH (benign prostatic hyperplasia)     Dr. Whitfield, PSA 0.27 2012     CAD (coronary artery disease)     MI      CHF (congestive heart failure) (H)      Colon polyp      History of smoking     Quit      HTN (hypertension), benign      Hyperlipidemia LDL goal <100      Ischemic cardiomyopathy     Dr. Alva     Lichen planus      Normal colonoscopy 1-3-08    Repeat 5-10 years     Pacemaker ,     ventricular tachycardia     Pleural effusion      Pneumonia      Pneumothorax     plus rib fx secondary to fall (L)     Seborrheic dermatitis      Toe fracture, right        PAST SURGICAL HISTORY:  Past Surgical History:   Procedure Laterality Date     COLONOSCOPY N/A 2015    Procedure: COLONOSCOPY;  Surgeon: Zac Austin MD;  Location:  GI     MOUTH SURGERY      Upper and lower dentures     ZZC CABG, ARTERIAL, TWO         FAMILY HISTORY:  Family History   Problem Relation Age of Onset     Diabetes Father      Cancer Father 73        lung and bone ca     Diabetes Sister      Kidney Cancer Sister 77     Breast Cancer Sister      Uterine Cancer Sister      Hypertension Mother      Cerebrovascular Disease Mother          age 75     Thyroid Disease Mother      Kidney Cancer Brother      Diabetes Brother      Heart Defect Brother        SOCIAL HISTORY:  Social History     Socioeconomic History     Marital status:      Spouse name: Not on file     Number of children: Not on file     Years of education: Not on file     Highest education level: Not on file   Occupational History     Not on file   Tobacco Use     Smoking status: Former Smoker     Quit date: 10/23/1997     Years since quittin.3     Smokeless tobacco: Never Used   Substance and Sexual Activity     Alcohol  "use: Yes     Alcohol/week: 0.0 standard drinks     Comment: 7 beers a week     Drug use: No     Sexual activity: Yes     Partners: Female   Other Topics Concern     Parent/sibling w/ CABG, MI or angioplasty before 65F 55M? Not Asked   Social History Narrative    , wife Nadira.     Social Determinants of Health     Financial Resource Strain: Not on file   Food Insecurity: Not on file   Transportation Needs: Not on file   Physical Activity: Not on file   Stress: Not on file   Social Connections: Not on file   Intimate Partner Violence: Not on file   Housing Stability: Not on file       Review of Systems:  Skin:        Eyes:       ENT:       Respiratory:  Positive for cough  Cardiovascular:  Negative    Gastroenterology: Negative    Genitourinary:  Positive for urgency  Musculoskeletal:  Positive for injury  Neurologic:  Negative    Psychiatric:  Negative    Heme/Lymph/Imm:  Negative    Endocrine:  Negative      Physical Exam:  Vitals: /58   Pulse 76   Ht 1.715 m (5' 7.5\")   Wt 83.8 kg (184 lb 12.8 oz)   SpO2 97%   BMI 28.52 kg/m     Please refer to dictation for physical exam    Recent Lab Results: all reviewed today  CBC RESULTS:  Lab Results   Component Value Date    WBC 8.6 12/08/2021    WBC 7.1 11/13/2019    RBC 4.07 (L) 12/08/2021    RBC 3.90 (L) 11/13/2019    HGB 14.5 12/08/2021    HGB 14.4 11/13/2019    HCT 42.8 12/08/2021    HCT 41.3 11/13/2019     (H) 12/08/2021     (H) 11/13/2019    MCH 35.6 (H) 12/08/2021    MCH 36.9 (H) 11/13/2019    MCHC 33.9 12/08/2021    MCHC 34.9 11/13/2019    RDW 11.9 12/08/2021    RDW 11.4 11/13/2019     (L) 12/08/2021     (L) 11/13/2019       BMP RESULTS:  Lab Results   Component Value Date     02/24/2022     11/13/2019    POTASSIUM 3.9 02/24/2022    POTASSIUM 4.2 01/06/2021    CHLORIDE 106 02/24/2022    CHLORIDE 103 11/13/2019    CO2 26 02/24/2022    CO2 25 11/13/2019    ANIONGAP 6 02/24/2022    ANIONGAP 7 11/13/2019    GLC " 128 (H) 02/24/2022     (A) 01/06/2021    BUN 13 02/24/2022    BUN 17 11/13/2019    CR 0.75 02/24/2022    CR 0.77 01/06/2021    GFRESTIMATED >90 02/24/2022    GFRESTIMATED >60 01/06/2021    GFRESTBLACK >60 01/06/2021    SALOME 9.5 02/24/2022    SALOME 9.2 11/13/2019        INR RESULTS:  Lab Results   Component Value Date    INR 3.0 (H) 11/16/2021    INR 3.2 (H) 11/02/2021    INR 2.00 (H) 06/29/2021    INR 2.10 (H) 06/15/2021         Thank you for allowing me to participate in the care of your patient.      Sincerely,     Danika Ye PA-C     [facility]  cc:   Danika Ye PA-C  [address]  [place] [address]

## 2022-02-28 NOTE — PROGRESS NOTES
Service Date: 2022    PRIMARY CARDIOLOGIST:  Jono Santiago MD     REASON FOR VISIT:  [facility] followup.    HISTORY OF PRESENT ILLNESS:  Mr. Arguelles is a delightful 71-year-old gentleman with past medical history significant for the followin.  Large anterior MI in  with an occluded LAD and severe circumflex lesion on coronary angiogram.  They were unable to complete PCI.  He underwent 2-vessel CABG, but I am unable to find the surgery reported by Dr. Vera.  2.  Longstanding cardiomyopathy, ischemic, with chronic systolic heart failure with an EF of 30%, historically, most recently 25%-30%.  3.  Wide-complex tachycardia after his MI with decreased EF, implant of ICD in .  Last generator change I believe around .  4.  Previously on anticoagulation with notes post-MI telling him to be on Coumadin for 6 months, but had been on Coumadin since the  with no diagnosis of AFib, PE, DVT or LV thrombus.  This has been stopped.  5.  Hypertension.  6.  Hyperlipidemia.  7.  Remote history of smoking, quit at the time of his MI.  8.  ICD in place with history of appropriate shocks.  9.  Moderate severe mitral regurgitation and mild to moderate tricuspid regurg.    We have been seeing him back in clinic to get his medications upgraded.  He previously followed with Dr. Hurtado way back and then Dr. Alva at [facility] and now is switching back to [place].  After a long discussion, we decided to try him on Entresto and he is here today for followup of that.  He had also been on Lopressor and we got him on Toprol.    Today, he says he continues to feel well.  He feels perhaps a tiny bit better than he did before, but he has not noticed any dramatic change since the Entresto.  He does feel like he is urinating more, but this is variable.  He also notes that he felt like a pop in his ribs when he reached over a garbage can.  This is the same rib he has had fractures on about 10 years ago when he  fell off a dumpster and had perforated his lungs causing a pneumothorax.  This is not uncommon for him and the pain wraps around his left side.  It is not necessarily exertional, but it is more positional.  His weight is stable at about 178 pounds at home and has been as low as 174.  He denies orthopnea or PND.  He can shovel some snow without difficulty.  He has not had syncope or presyncope.  He still notes he feels tired after dinner.  He is not sure if that is better.    SOCIAL HISTORY:  He is .  His wife has COPD, so they have other expensive medications.  He is a retired  and when he initially presented with symptoms in 1997, they were misinterpreted as musculoskeletal stress due to his workload.  He had likely been infarcting for days, if not weeks before diagnosis.    PHYSICAL EXAMINATION:    GENERAL:  Well-developed, well-nourished gentleman in no acute distress.  HEENT:  Normocephalic, atraumatic.  HEART:  Regular in rate and rhythm with 2/6 systolic murmur heard best at lower left sternal border.  LUNGS:  Clear, without wheezes, rales or rhonchi.  NECK:  Neck veins just visible at 30 degrees.  EXTREMITIES:  No peripheral edema.    Labs done last week were reviewed showing creatinine 0.75, BUN 13, potassium 3.9, sodium 138.  N-terminal proBNP 566.    ASSESSMENT AND PLAN:    1.  Ischemic cardiomyopathy since the late 90s with chronic systolic heart failure, an EF of 25%-30%.  We are working on optimizing his medications with the latest knowledge.  Initially Entresto was potentially cost prohibitive, but now he is willing to give it a try.  We will increase that to 49/51 mg a b.i.d.  He will remain on spironolactone 25 mg and Toprol 200 mg a day.  We talked about cutting back his Lasix and going every other day, but he has a history of when it has been discontinued of developing heart failure, so we will keep him on 20 mg a day.  We will also consider Jardiance down the road, although I  expect that would also be expensive medication.  2.  History of nonsustained as well as sustained VT with ICD in place with history of appropriate shocks, routine device checks.  We will not start amiodarone at this time.  3.  Moderate severe mitral regurgitation and tricuspid regurgitation, likely functional.  We will follow.  4.  Coronary artery disease with history of CAB x2.  He continues to take aspirin.  He has noticed some easy bruising, both with a spot on his wrist and a spot on his eye recently.  We will check a CBC with platelets at next visit.  5.  Dyslipidemia, excellent control.  6.  Hypertension, elevated initially.  Will continue to improve with increased dose of Entresto.    I will see him back in 1 month with CBC and BMP prior to that visit, sooner with concerns.    Danika Ye PA-C        D: 2022   T: 2022   MT: FABIEN    Name:     SHANA TREJO  MRN:      [id number]        Account:      [id number]   :      1950           Service Date: 2022       Document: [id number]

## 2022-02-28 NOTE — PATIENT INSTRUCTIONS
Call CORE nurse for any questions or concerns Mon-Fri 8am-4pm:                                                #[phone]                                       For concerns after hours:                                               #[phone]     1: Medication changes: increase Entresto to 49/51 mg twice a day.  You can take 2 of your 24/26 mg pills twice a day until they're gone.    Continue other medications.      2: Plan from today: we'll check labs and see you back in about 3-4 weeks.      3: Lab results: labs from last week look great.  Next time we'll check for platelets since you've been bruising a bit more.   Component      Latest Ref Rng & Units 2/24/2022   Sodium      133 - 144 mmol/L 138   Potassium      3.4 - 5.3 mmol/L 3.9   Chloride      94 - 109 mmol/L 106   Carbon Dioxide      20 - 32 mmol/L 26   Anion Gap      3 - 14 mmol/L 6   Urea Nitrogen      7 - 30 mg/dL 13   Creatinine      0.66 - 1.25 mg/dL 0.75   Calcium      8.5 - 10.1 mg/dL 9.5   Glucose      70 - 99 mg/dL 128 (H)   GFR Estimate      >60 mL/min/1.73m2 >90   N-Terminal Pro Bnp      0 - 125 pg/mL 566 (H)

## 2022-02-28 NOTE — PROGRESS NOTES
[id number]      HPI and Plan:   See dictation    Orders this Visit:  Orders Placed This Encounter   Procedures     CBC with platelets     CBC with platelets     Basic metabolic panel     Follow-Up with Cardiology SHARIF [facility]     Orders Placed This Encounter   Medications     sacubitril-valsartan (ENTRESTO) 49-51 MG per tablet     Sig: Take 1 tablet by mouth 2 times daily     Dispense:  60 tablet     Refill:  11     Medications Discontinued During This Encounter   Medication Reason     sacubitril-valsartan (ENTRESTO) 24-26 MG per tablet          Encounter Diagnoses   Name Primary?     Chronic systolic congestive heart failure (H)      Easy bruising Yes       CURRENT MEDICATIONS:  Current Outpatient Medications   Medication Sig Dispense Refill     aspirin (ASA) 81 MG chewable tablet Take 1 tablet (81 mg) by mouth daily       atorvastatin (LIPITOR) 20 MG tablet Take 1 tablet (20 mg) by mouth daily 90 tablet 3     Cholecalciferol (VITAMIN D) 1000 UNITS capsule Take 1 capsule by mouth daily.       fluocinolone (SYNALAR) 0.01 % solution PRN 60 mL 1     furosemide (LASIX) 40 MG tablet Take 0.5 tablets (20 mg) by mouth daily 90 tablet 3     hydrocortisone (WESTCORT) 0.2 % external cream as needed 45 g 1     metoprolol succinate ER (TOPROL XL) 200 MG 24 hr tablet Take 1 tablet (200 mg) by mouth daily 90 tablet 3     multivitamin, therapeutic with minerals (MULTI-VITAMIN) TABS Take 1 tablet by mouth daily.       NIACIN 750 MG OR TBCR 2 tabs qhs       nitroGLYcerin (NITROSTAT) 0.4 MG sublingual tablet Place 1 tablet (0.4 mg) under the tongue every 5 minutes as needed for chest pain 30 tablet 11     sacubitril-valsartan (ENTRESTO) 49-51 MG per tablet Take 1 tablet by mouth 2 times daily 60 tablet 11     spironolactone (ALDACTONE) 25 MG tablet Take 1 tablet (25 mg) by mouth daily 90 tablet 3       ALLERGIES     Allergies   Allergen Reactions     Septra [Sulf[facility] Drugs]        PAST MEDICAL HISTORY:  Past Medical History:   Diagnosis Date      BPH (benign prostatic hyperplasia)     Dr. Whitfield, PSA 0.27 2012     CAD (coronary artery disease)     MI      CHF (congestive heart failure) (H)      Colon polyp      History of smoking     Quit      HTN (hypertension), benign      Hyperlipidemia LDL goal <100      Ischemic cardiomyopathy     Dr. Alva     Lichen planus      Normal colonoscopy 1-3-08    Repeat 5-10 years     Pacemaker ,     ventricular tachycardia     Pleural effusion      Pneumonia      Pneumothorax     plus rib fx secondary to fall (L)     Seborrheic dermatitis      Toe fracture, right        PAST SURGICAL HISTORY:  Past Surgical History:   Procedure Laterality Date     COLONOSCOPY N/A 2015    Procedure: COLONOSCOPY;  Surgeon: Zac Austin MD;  Location:  GI     MOUTH SURGERY      Upper and lower dentures     ZZC CABG, ARTERIAL, TWO         FAMILY HISTORY:  Family History   Problem Relation Age of Onset     Diabetes Father      Cancer Father 73        lung and bone ca     Diabetes Sister      Kidney Cancer Sister 77     Breast Cancer Sister      Uterine Cancer Sister      Hypertension Mother      Cerebrovascular Disease Mother          age 75     Thyroid Disease Mother      Kidney Cancer Brother      Diabetes Brother      Heart Defect Brother        SOCIAL HISTORY:  Social History     Socioeconomic History     Marital status:      Spouse name: Not on file     Number of children: Not on file     Years of education: Not on file     Highest education level: Not on file   Occupational History     Not on file   Tobacco Use     Smoking status: Former Smoker     Quit date: 10/23/1997     Years since quittin.3     Smokeless tobacco: Never Used   Substance and Sexual Activity     Alcohol use: Yes     Alcohol/week: 0.0 standard drinks     Comment: 7 beers a week     Drug use: No     Sexual activity: Yes     Partners: Female   Other Topics Concern     Parent/sibling w/ CABG, MI or  "angioplasty before 65F 55M? Not Asked   Social History Narrative    , wife Nadira.     Social Determinants of Health     Financial Resource Strain: Not on file   Food Insecurity: Not on file   Transportation Needs: Not on file   Physical Activity: Not on file   Stress: Not on file   Social Connections: Not on file   Intimate Partner Violence: Not on file   Housing Stability: Not on file       Review of Systems:  Skin:        Eyes:       ENT:       Respiratory:  Positive for cough  Cardiovascular:  Negative    Gastroenterology: Negative    Genitourinary:  Positive for urgency  Musculoskeletal:  Positive for injury  Neurologic:  Negative    Psychiatric:  Negative    Heme/Lymph/Imm:  Negative    Endocrine:  Negative      Physical Exam:  Vitals: /58   Pulse 76   Ht 1.715 m (5' 7.5\")   Wt 83.8 kg (184 lb 12.8 oz)   SpO2 97%   BMI 28.52 kg/m     Please refer to dictation for physical exam    Recent Lab Results: all reviewed today  CBC RESULTS:  Lab Results   Component Value Date    WBC 8.6 12/08/2021    WBC 7.1 11/13/2019    RBC 4.07 (L) 12/08/2021    RBC 3.90 (L) 11/13/2019    HGB 14.5 12/08/2021    HGB 14.4 11/13/2019    HCT 42.8 12/08/2021    HCT 41.3 11/13/2019     (H) 12/08/2021     (H) 11/13/2019    MCH 35.6 (H) 12/08/2021    MCH 36.9 (H) 11/13/2019    MCHC 33.9 12/08/2021    MCHC 34.9 11/13/2019    RDW 11.9 12/08/2021    RDW 11.4 11/13/2019     (L) 12/08/2021     (L) 11/13/2019       BMP RESULTS:  Lab Results   Component Value Date     02/24/2022     11/13/2019    POTASSIUM 3.9 02/24/2022    POTASSIUM 4.2 01/06/2021    CHLORIDE 106 02/24/2022    CHLORIDE 103 11/13/2019    CO2 26 02/24/2022    CO2 25 11/13/2019    ANIONGAP 6 02/24/2022    ANIONGAP 7 11/13/2019     (H) 02/24/2022     (A) 01/06/2021    BUN 13 02/24/2022    BUN 17 11/13/2019    CR 0.75 02/24/2022    CR 0.77 01/06/2021    GFRESTIMATED >90 02/24/2022    GFRESTIMATED >60 01/06/2021    " GFRESTBLACK >60 01/06/2021    SALOME 9.5 02/24/2022    SALOME 9.2 11/13/2019        INR RESULTS:  Lab Results   Component Value Date    INR 3.0 (H) 11/16/2021    INR 3.2 (H) 11/02/2021    INR 2.00 (H) 06/29/2021    INR 2.10 (H) 06/15/2021           WILLA Ye, PA-C  [address]  [place] [id number]

## 2022-03-24 ENCOUNTER — ANCILLARY PROCEDURE (OUTPATIENT)
Dept: CARDIOLOGY | Facility: CLINIC | Age: 72
End: 2022-03-24
Attending: INTERNAL MEDICINE
Payer: MEDICARE

## 2022-03-24 DIAGNOSIS — Z95.810 ICD (IMPLANTABLE CARDIOVERTER-DEFIBRILLATOR) IN PLACE: ICD-10-CM

## 2022-03-24 DIAGNOSIS — Z95.810 ICD (IMPLANTABLE CARDIOVERTER-DEFIBRILLATOR) IN PLACE: Primary | ICD-10-CM

## 2022-03-24 PROCEDURE — 93295 DEV INTERROG REMOTE 1/2/MLT: CPT | Performed by: INTERNAL MEDICINE

## 2022-03-24 PROCEDURE — 93296 REM INTERROG EVL PM/IDS: CPT | Performed by: INTERNAL MEDICINE

## 2022-03-25 ENCOUNTER — LAB (OUTPATIENT)
Dept: LAB | Facility: CLINIC | Age: 72
End: 2022-03-25
Payer: MEDICARE

## 2022-03-25 DIAGNOSIS — I50.22 CHRONIC SYSTOLIC CONGESTIVE HEART FAILURE (H): ICD-10-CM

## 2022-03-25 DIAGNOSIS — E78.5 HYPERLIPIDEMIA LDL GOAL <100: ICD-10-CM

## 2022-03-25 DIAGNOSIS — R23.3 EASY BRUISING: ICD-10-CM

## 2022-03-25 DIAGNOSIS — R73.09 ELEVATED GLUCOSE: ICD-10-CM

## 2022-03-25 LAB
ANION GAP SERPL CALCULATED.3IONS-SCNC: 5 MMOL/L (ref 3–14)
BUN SERPL-MCNC: 14 MG/DL (ref 7–30)
CALCIUM SERPL-MCNC: 9.7 MG/DL (ref 8.5–10.1)
CHLORIDE BLD-SCNC: 101 MMOL/L (ref 94–109)
CO2 SERPL-SCNC: 30 MMOL/L (ref 20–32)
CREAT SERPL-MCNC: 0.76 MG/DL (ref 0.66–1.25)
ERYTHROCYTE [DISTWIDTH] IN BLOOD BY AUTOMATED COUNT: 11.3 % (ref 10–15)
GFR SERPL CREATININE-BSD FRML MDRD: >90 ML/MIN/1.73M2
GLUCOSE BLD-MCNC: 137 MG/DL (ref 70–99)
HCT VFR BLD AUTO: 42.4 % (ref 40–53)
HGB BLD-MCNC: 14.7 G/DL (ref 13.3–17.7)
MCH RBC QN AUTO: 36.6 PG (ref 26.5–33)
MCHC RBC AUTO-ENTMCNC: 34.7 G/DL (ref 31.5–36.5)
MCV RBC AUTO: 106 FL (ref 78–100)
PLATELET # BLD AUTO: 131 10E3/UL (ref 150–450)
POTASSIUM BLD-SCNC: 4 MMOL/L (ref 3.4–5.3)
RBC # BLD AUTO: 4.02 10E6/UL (ref 4.4–5.9)
SODIUM SERPL-SCNC: 136 MMOL/L (ref 133–144)
WBC # BLD AUTO: 7.9 10E3/UL (ref 4–11)

## 2022-03-25 PROCEDURE — 36415 COLL VENOUS BLD VENIPUNCTURE: CPT

## 2022-03-25 PROCEDURE — 80061 LIPID PANEL: CPT

## 2022-03-25 PROCEDURE — 83036 HEMOGLOBIN GLYCOSYLATED A1C: CPT

## 2022-03-25 PROCEDURE — 80048 BASIC METABOLIC PNL TOTAL CA: CPT

## 2022-03-25 PROCEDURE — 84460 ALANINE AMINO (ALT) (SGPT): CPT

## 2022-03-25 PROCEDURE — 85027 COMPLETE CBC AUTOMATED: CPT

## 2022-03-28 ENCOUNTER — OFFICE VISIT (OUTPATIENT)
Dept: CARDIOLOGY | Facility: CLINIC | Age: 72
End: 2022-03-28
Payer: MEDICARE

## 2022-03-28 VITALS
HEART RATE: 78 BPM | DIASTOLIC BLOOD PRESSURE: 68 MMHG | SYSTOLIC BLOOD PRESSURE: 136 MMHG | OXYGEN SATURATION: 97 % | HEIGHT: 68 IN | BODY MASS INDEX: 28.22 KG/M2 | WEIGHT: 186.2 LBS

## 2022-03-28 DIAGNOSIS — I50.22 CHRONIC SYSTOLIC CONGESTIVE HEART FAILURE (H): ICD-10-CM

## 2022-03-28 DIAGNOSIS — E78.5 HYPERLIPIDEMIA LDL GOAL <100: ICD-10-CM

## 2022-03-28 DIAGNOSIS — R73.09 ELEVATED GLUCOSE: Primary | ICD-10-CM

## 2022-03-28 LAB
ALT SERPL W P-5'-P-CCNC: 31 U/L (ref 0–70)
CHOLEST SERPL-MCNC: 155 MG/DL
HDLC SERPL-MCNC: 53 MG/DL
LDLC SERPL CALC-MCNC: 69 MG/DL
NONHDLC SERPL-MCNC: 102 MG/DL
TRIGL SERPL-MCNC: 165 MG/DL

## 2022-03-28 PROCEDURE — 99214 OFFICE O/P EST MOD 30 MIN: CPT | Performed by: PHYSICIAN ASSISTANT

## 2022-03-28 RX ORDER — SACUBITRIL AND VALSARTAN 97; 103 MG/1; MG/1
1 TABLET, FILM COATED ORAL 2 TIMES DAILY
Qty: 180 TABLET | Refills: 3 | Status: SHIPPED | OUTPATIENT
Start: 2022-03-28 | End: 2023-03-15

## 2022-03-28 NOTE — PATIENT INSTRUCTIONS
Call [facility] nurse for any questions or concerns Mon-Fri 8am-4pm:                                                #[phone]                                       For concerns after hours:                                               #[phone]     1: Medication changes: Increase Entresto to 97/103 mg twice a day, when the 49/51 dose is gone.    Continue other medications.      2: Plan from today: call if you're worse at all.    Follow up with Dr. Marroquin as scheduled in June with labs before that visit.    Follow up with Dr. Santiago in November with an echocardiogram before that visit.      3: Lab results: labs look good overall.    Component      Latest Ref Rng & Units 2/24/2022 3/25/2022   Sodium      133 - 144 mmol/L  136   Potassium      3.4 - 5.3 mmol/L  4.0   Chloride      94 - 109 mmol/L  101   Carbon Dioxide      20 - 32 mmol/L  30   Anion Gap      3 - 14 mmol/L  5   Urea Nitrogen      7 - 30 mg/dL  14   Creatinine      0.66 - 1.25 mg/dL  0.76   Calcium      8.5 - 10.1 mg/dL  9.7   Glucose      70 - 99 mg/dL  137 (H)   GFR Estimate      >60 mL/min/1.73m2  >90   WBC      4.0 - 11.0 10e3/uL  7.9   RBC Count      4.40 - 5.90 10e6/uL  4.02 (L)   Hemoglobin      13.3 - 17.7 g/dL  14.7   Hematocrit      40.0 - 53.0 %  42.4   MCV      78 - 100 fL  106 (H)   MCH      26.5 - 33.0 pg  36.6 (H)   MCHC      31.5 - 36.5 g/dL  34.7   RDW      10.0 - 15.0 %  11.3   Platelet Count      150 - 450 10e3/uL  131 (L)   N-Terminal Pro Bnp      0 - 125 pg/mL 566 (H)

## 2022-03-28 NOTE — LETTER
3/28/2022    Janet Alcaraz PA-C  [address]  [place] [address]    RE: Tae Arguelles       Dear Colleague,     I had the pleasure of seeing Tae [facility] in the [facility].  [id number]      HPI and Plan:   See dictation    Orders this Visit:  Orders Placed This Encounter   Procedures     Basic metabolic panel     N terminal pro BNP outpatient     Basic metabolic panel     Hemoglobin A1c     Lipid panel reflex to direct LDL Fasting     ALT     Follow-Up with Cardiology [facility]     Echocardiogram Complete     Orders Placed This Encounter   Medications     sacubitril-valsartan (ENTRESTO)  MG per tablet     Sig: Take 1 tablet by mouth 2 times daily     Dispense:  180 tablet     Refill:  3     Medications Discontinued During This Encounter   Medication Reason     sacubitril-valsartan (ENTRESTO) 49-51 MG per tablet          Encounter Diagnoses   Name Primary?     Chronic systolic congestive heart failure (H)      Elevated glucose Yes     Hyperlipidemia LDL goal <100        CURRENT MEDICATIONS:  Current Outpatient Medications   Medication Sig Dispense Refill     aspirin (ASA) 81 MG chewable tablet Take 1 tablet (81 mg) by mouth daily       atorvastatin (LIPITOR) 20 MG tablet Take 1 tablet (20 mg) by mouth daily 90 tablet 3     Cholecalciferol (VITAMIN D) 1000 UNITS capsule Take 1 capsule by mouth daily.       fluocinolone (SYNALAR) 0.01 % solution PRN 60 mL 1     furosemide (LASIX) 40 MG tablet Take 0.5 tablets (20 mg) by mouth daily 90 tablet 3     hydrocortisone (WESTCORT) 0.2 % external cream as needed 45 g 1     metoprolol succinate ER (TOPROL XL) 200 MG 24 hr tablet Take 1 tablet (200 mg) by mouth daily 90 tablet 3     multivitamin, therapeutic with minerals (MULTI-VITAMIN) TABS Take 1 tablet by mouth daily.       NIACIN 750 MG OR TBCR 2 tabs qhs       nitroGLYcerin (NITROSTAT) 0.4 MG sublingual tablet Place 1 tablet (0.4 mg) under the tongue every 5 minutes as needed for  chest pain 30 tablet 11     sacubitril-valsartan (ENTRESTO)  MG per tablet Take 1 tablet by mouth 2 times daily 180 tablet 3     spironolactone (ALDACTONE) 25 MG tablet Take 1 tablet (25 mg) by mouth daily 90 tablet 3       ALLERGIES     Allergies   Allergen Reactions     Septra [Sulfa Drugs]        PAST MEDICAL HISTORY:  Past Medical History:   Diagnosis Date     BPH (benign prostatic hyperplasia)     Dr. Whitfield, PSA 0.27 2012     CAD (coronary artery disease)     MI      CHF (congestive heart failure) (H)      Colon polyp      History of smoking     Quit      HTN (hypertension), benign      Hyperlipidemia LDL goal <100      Ischemic cardiomyopathy     Dr. Alva     Lichen planus      Normal colonoscopy 1-3-08    Repeat 5-10 years     Pacemaker ,     ventricular tachycardia     Pleural effusion      Pneumonia      Pneumothorax     plus rib fx secondary to fall (L)     Seborrheic dermatitis      Toe fracture, right        PAST SURGICAL HISTORY:  Past Surgical History:   Procedure Laterality Date     COLONOSCOPY N/A 2015    Procedure: COLONOSCOPY;  Surgeon: Zac Austin MD;  Location:  GI     MOUTH SURGERY      Upper and lower dentures     ZC CABG, ARTERIAL, TWO         FAMILY HISTORY:  Family History   Problem Relation Age of Onset     Diabetes Father      Cancer Father 73        lung and bone ca     Diabetes Sister      Kidney Cancer Sister 77     Breast Cancer Sister      Uterine Cancer Sister      Hypertension Mother      Cerebrovascular Disease Mother          age 75     Thyroid Disease Mother      Kidney Cancer Brother      Diabetes Brother      Heart Defect Brother        SOCIAL HISTORY:  Social History     Socioeconomic History     Marital status:      Spouse name: Not on file     Number of children: Not on file     Years of education: Not on file     Highest education level: Not on file   Occupational History     Not on file   Tobacco Use  "    Smoking status: Former Smoker     Quit date: 10/23/1997     Years since quittin.4     Smokeless tobacco: Never Used   Substance and Sexual Activity     Alcohol use: Yes     Alcohol/week: 0.0 standard drinks     Comment: 7 beers a week     Drug use: No     Sexual activity: Yes     Partners: Female   Other Topics Concern     Parent/sibling w/ CABG, MI or angioplasty before 65F 55M? Not Asked   Social History Narrative    , wife Nadira.     Social Determinants of Health     Financial Resource Strain: Not on file   Food Insecurity: Not on file   Transportation Needs: Not on file   Physical Activity: Not on file   Stress: Not on file   Social Connections: Not on file   Intimate Partner Violence: Not on file   Housing Stability: Not on file       Review of Systems:  Skin:  Negative     Eyes:  Negative    ENT:  Negative    Respiratory:  Positive for cough  Cardiovascular:  Negative    Gastroenterology: Negative    Genitourinary:  Positive for urgency  Musculoskeletal:  Negative    Neurologic:  Negative    Psychiatric:  Negative    Heme/Lymph/Imm:  Negative    Endocrine:  Negative      Physical Exam:  Vitals: /68   Pulse 78   Ht 1.715 m (5' 7.5\")   Wt 84.5 kg (186 lb 3.2 oz)   SpO2 97%   BMI 28.73 kg/m     Please refer to dictation for physical exam    Recent Lab Results: all reviewed today  CBC RESULTS:  Lab Results   Component Value Date    WBC 7.9 2022    WBC 7.1 2019    RBC 4.02 (L) 2022    RBC 3.90 (L) 2019    HGB 14.7 2022    HGB 14.4 2019    HCT 42.4 2022    HCT 41.3 2019     (H) 2022     (H) 2019    MCH 36.6 (H) 2022    MCH 36.9 (H) 2019    MCHC 34.7 2022    MCHC 34.9 2019    RDW 11.3 2022    RDW 11.4 2019     (L) 2022     (L) 2019       BMP RESULTS:  Lab Results   Component Value Date     2022     2019    POTASSIUM 4.0 2022    " POTASSIUM 4.2 01/06/2021    CHLORIDE 101 03/25/2022    CHLORIDE 103 11/13/2019    CO2 30 03/25/2022    CO2 25 11/13/2019    ANIONGAP 5 03/25/2022    ANIONGAP 7 11/13/2019     (H) 03/25/2022     (A) 01/06/2021    BUN 14 03/25/2022    BUN 17 11/13/2019    CR 0.76 03/25/2022    CR 0.77 01/06/2021    GFRESTIMATED >90 03/25/2022    GFRESTIMATED >60 01/06/2021    GFRESTBLACK >60 01/06/2021    SALOME 9.7 03/25/2022    SALOME 9.2 11/13/2019        INR RESULTS:  Lab Results   Component Value Date    INR 3.0 (H) 11/16/2021    INR 3.2 (H) 11/02/2021    INR 2.00 (H) 06/29/2021    INR 2.10 (H) 06/15/2021         Thank you for allowing me to participate in the care of your patient.      Sincerely,     Danika Ye PA-C     [facility]  cc:   Danika Ye PA-C  [address]  [place] [id number]

## 2022-03-28 NOTE — PROGRESS NOTES
[id number]      HPI and Plan:   See dictation    Orders this Visit:  Orders Placed This Encounter   Procedures     Basic metabolic panel     N terminal pro BNP outpatient     Basic metabolic panel     Hemoglobin A1c     Lipid panel reflex to direct LDL Fasting     ALT     Follow-Up with Cardiology [facility]     Echocardiogram Complete     Orders Placed This Encounter   Medications     sacubitril-valsartan (ENTRESTO)  MG per tablet     Sig: Take 1 tablet by mouth 2 times daily     Dispense:  180 tablet     Refill:  3     Medications Discontinued During This Encounter   Medication Reason     sacubitril-valsartan (ENTRESTO) 49-51 MG per tablet          Encounter Diagnoses   Name Primary?     Chronic systolic congestive heart failure (H)      Elevated glucose Yes     Hyperlipidemia LDL goal <100        CURRENT MEDICATIONS:  Current Outpatient Medications   Medication Sig Dispense Refill     aspirin (ASA) 81 MG chewable tablet Take 1 tablet (81 mg) by mouth daily       atorvastatin (LIPITOR) 20 MG tablet Take 1 tablet (20 mg) by mouth daily 90 tablet 3     Cholecalciferol (VITAMIN D) 1000 UNITS capsule Take 1 capsule by mouth daily.       fluocinolone (SYNALAR) 0.01 % solution PRN 60 mL 1     furosemide (LASIX) 40 MG tablet Take 0.5 tablets (20 mg) by mouth daily 90 tablet 3     hydrocortisone (WESTCORT) 0.2 % external cream as needed 45 g 1     metoprolol succinate ER (TOPROL XL) 200 MG 24 hr tablet Take 1 tablet (200 mg) by mouth daily 90 tablet 3     multivitamin, therapeutic with minerals (MULTI-VITAMIN) TABS Take 1 tablet by mouth daily.       NIACIN 750 MG OR TBCR 2 tabs qhs       nitroGLYcerin (NITROSTAT) 0.4 MG sublingual tablet Place 1 tablet (0.4 mg) under the tongue every 5 minutes as needed for chest pain 30 tablet 11     sacubitril-valsartan (ENTRESTO)  MG per tablet Take 1 tablet by mouth 2 times daily 180 tablet 3     spironolactone (ALDACTONE) 25 MG tablet Take 1 tablet (25 mg) by mouth daily 90  tablet 3       ALLERGIES     Allergies   Allergen Reactions     Septra [Sulfa Drugs]        PAST MEDICAL HISTORY:  Past Medical History:   Diagnosis Date     BPH (benign prostatic hyperplasia)     Dr. Whitfield, PSA 0.27 2012     CAD (coronary artery disease)     MI      CHF (congestive heart failure) (H)      Colon polyp      History of smoking     Quit      HTN (hypertension), benign      Hyperlipidemia LDL goal <100      Ischemic cardiomyopathy     Dr. Alva     Lichen planus      Normal colonoscopy 1-3-08    Repeat 5-10 years     Pacemaker ,     ventricular tachycardia     Pleural effusion      Pneumonia      Pneumothorax     plus rib fx secondary to fall (L)     Seborrheic dermatitis      Toe fracture, right        PAST SURGICAL HISTORY:  Past Surgical History:   Procedure Laterality Date     COLONOSCOPY N/A 2015    Procedure: COLONOSCOPY;  Surgeon: Zac Austin MD;  Location:  GI     MOUTH SURGERY      Upper and lower dentures     ZZC CABG, ARTERIAL, TWO         FAMILY HISTORY:  Family History   Problem Relation Age of Onset     Diabetes Father      Cancer Father 73        lung and bone ca     Diabetes Sister      Kidney Cancer Sister 77     Breast Cancer Sister      Uterine Cancer Sister      Hypertension Mother      Cerebrovascular Disease Mother          age 75     Thyroid Disease Mother      Kidney Cancer Brother      Diabetes Brother      Heart Defect Brother        SOCIAL HISTORY:  Social History     Socioeconomic History     Marital status:      Spouse name: Not on file     Number of children: Not on file     Years of education: Not on file     Highest education level: Not on file   Occupational History     Not on file   Tobacco Use     Smoking status: Former Smoker     Quit date: 10/23/1997     Years since quittin.4     Smokeless tobacco: Never Used   Substance and Sexual Activity     Alcohol use: Yes     Alcohol/week: 0.0 standard drinks  "    Comment: 7 beers a week     Drug use: No     Sexual activity: Yes     Partners: Female   Other Topics Concern     Parent/sibling w/ CABG, MI or angioplasty before 65F 55M? Not Asked   Social History Narrative    , wife Nadira.     Social Determinants of Health     Financial Resource Strain: Not on file   Food Insecurity: Not on file   Transportation Needs: Not on file   Physical Activity: Not on file   Stress: Not on file   Social Connections: Not on file   Intimate Partner Violence: Not on file   Housing Stability: Not on file       Review of Systems:  Skin:  Negative     Eyes:  Negative    ENT:  Negative    Respiratory:  Positive for cough  Cardiovascular:  Negative    Gastroenterology: Negative    Genitourinary:  Positive for urgency  Musculoskeletal:  Negative    Neurologic:  Negative    Psychiatric:  Negative    Heme/Lymph/Imm:  Negative    Endocrine:  Negative      Physical Exam:  Vitals: /68   Pulse 78   Ht 1.715 m (5' 7.5\")   Wt 84.5 kg (186 lb 3.2 oz)   SpO2 97%   BMI 28.73 kg/m     Please refer to dictation for physical exam    Recent Lab Results: all reviewed today  CBC RESULTS:  Lab Results   Component Value Date    WBC 7.9 03/25/2022    WBC 7.1 11/13/2019    RBC 4.02 (L) 03/25/2022    RBC 3.90 (L) 11/13/2019    HGB 14.7 03/25/2022    HGB 14.4 11/13/2019    HCT 42.4 03/25/2022    HCT 41.3 11/13/2019     (H) 03/25/2022     (H) 11/13/2019    MCH 36.6 (H) 03/25/2022    MCH 36.9 (H) 11/13/2019    MCHC 34.7 03/25/2022    MCHC 34.9 11/13/2019    RDW 11.3 03/25/2022    RDW 11.4 11/13/2019     (L) 03/25/2022     (L) 11/13/2019       BMP RESULTS:  Lab Results   Component Value Date     03/25/2022     11/13/2019    POTASSIUM 4.0 03/25/2022    POTASSIUM 4.2 01/06/2021    CHLORIDE 101 03/25/2022    CHLORIDE 103 11/13/2019    CO2 30 03/25/2022    CO2 25 11/13/2019    ANIONGAP 5 03/25/2022    ANIONGAP 7 11/13/2019     (H) 03/25/2022     (A) " 01/06/2021    BUN 14 03/25/2022    BUN 17 11/13/2019    CR 0.76 03/25/2022    CR 0.77 01/06/2021    GFRESTIMATED >90 03/25/2022    GFRESTIMATED >60 01/06/2021    GFRESTBLACK >60 01/06/2021    SALOME 9.7 03/25/2022    SALOME 9.2 11/13/2019        INR RESULTS:  Lab Results   Component Value Date    INR 3.0 (H) 11/16/2021    INR 3.2 (H) 11/02/2021    INR 2.00 (H) 06/29/2021    INR 2.10 (H) 06/15/2021           CC  Danika Ye, PA-C  [address]  [place] [id number]

## 2022-03-28 NOTE — RESULT ENCOUNTER NOTE
Will review or did review during clinic visit.  Please see progress note for plan.  Danika eY PA-C 3/28/2022 3:39 PM

## 2022-03-29 LAB — HBA1C MFR BLD: 5.5 % (ref 0–5.6)

## 2022-03-29 NOTE — PROGRESS NOTES
"Service Date: 2022    PRIMARY CARDIOLOGIST:  Dr. Santiago.    REASON FOR VISIT:  [facility] followup.    HISTORY OF PRESENT ILLNESS:  Mr. Arguelles is a delightful 71-year-old gentleman with past medical history significant for the followin.  Large anterior MI in  with an occluded LAD and severe circumflex lesion on coronary angiogram.  They were unable to complete PCI and he underwent 2-vessel CABG, but I am unable to find the surgical report.  It was completed by Dr. Vera.  2.  Longstanding cardiomyopathy, ischemic with chronic systolic heart failure and EF of 30% historically, more recently 25%-30%.  3.  Wide complex tachycardia post-MI with a decreased EF, with ICD in place since  with gen change.  4.  Previously on anticoagulation with notes post-MI telling him to be on Coumadin for 6 months but had been on Coumadin since the  until we met him with no diagnosis of AFib, PE, DVT, LV thrombus or other abnormalities.  This has now been discontinued and he remains on aspirin alone.  5.  Hypertension.  6.  Hyperlipidemia.  7.  Remote history of smoking.  8.  ICD in place with history of appropriate shocks.  9.  Moderate severe mitral regurg and mild to moderate tricuspid regurg.    He previously followed with Dr. Hurtado and Dr. Alva at [facility] and is now following back with Dr. Santiago.  We have been working on optimizing his medications and getting him on the newest technology.  We recently started Entresto, and overall he felt a tiny bit better with that at the last clinic visit.  We also switched him over from Lopressor to Toprol and got him on spironolactone.    Today he notices that he feels more normal on the Entresto, kind of back to \"his old self.\"  He is not sure if this is related to the Entresto or to the change in weather, but he feels like overall he is doing better and has more energy and has a better sense of wellbeing.  He also had a pop in his ribs a few weeks ago, " this is slowly improving and he can now lie on that side.  His weight is stable at 179 pounds at home.  He has been as low as 174 pounds.  He otherwise denies orthopnea or PND and is overall doing okay.  He has not been on the treadmill, and he previously could walk at a 3.7 miles per hour pace for about 35 minutes.    SOCIAL HISTORY:  He is , his wife has COPD, so they have other expensive medications.  He is a retired .  When he initially presented with symptoms in 1997, they were misdiagnosed as musculoskeletal due to his workload.  He had been infarcting for days, if not weeks before the diagnosis.  He quit smoking at the time of his MI.  He has about 7 beers a week.  He is retired and exercises regularly.    PHYSICAL EXAMINATION:    GENERAL:  Well-developed, well-nourished gentleman in no acute distress.  HEENT:  Normocephalic, atraumatic.  HEART:  Regular in rate and rhythm.  There is a 2/6 systolic murmur heard best at the apex.  LUNGS:  Clear, without wheezes, rales or rhonchi.  EXTREMITIES:  Without peripheral edema.  Neck veins are about 7 cm JVP with minimal hepatojugular reflux.  SKIN:  Warm and dry.    LABORATORY DATA:  Labs reviewed include a creatinine 0.76, a BUN 14, sodium 136, sodium 4.0, glucose is 137, hemoglobin is 14.7, platelets are 131,000.    ASSESSMENT AND PLAN:    1.  Ischemic cardiomyopathy since about 1997 with EF of 25%-30%, with now on more optimal medications including 200 mg of Toprol and 25 mg of spironolactone.  I will now increase his Entresto up to 97/103 b.i.d.  He is agreeable to that.  We will keep him on Lasix 20 mg daily, as he has had issues with heart failure when he has been on lower doses.  We could also consider SGLT2 inhibitors, but we will wait and see if his A1c comes back elevated, that would give us a better reason for it, although I think if we look between the Jardiance and Entresto, the Entresto likely has more benefit for him.  Cost is  definitely an issue.  2.  History of nonsustained VT with ICD in place with history of appropriate shocks.  Last device check was done .  He is VVI at 40 without any VT or VF noted.  His OptiVol was briefly elevated in February and is now back down to normal.  He will follow up with Dr. Marroquin as scheduled in .  3.  Coronary artery disease with history of CABG x2.  He continues on aspirin and statin.  He has not had any anginal symptoms.  4.  Dyslipidemia, excellent control but overdue for labs.  We will add on to labs done recently.  We will also get an ALT.  5.  Hypertension.  Continues to remain borderline in spite of increasing Entresto dose.  Will follow.  I expect it should improve with max dose of Entresto.  6.  Moderate to severe mitral regurg and mild to moderate tricuspid regurg.  We will repeat an echocardiogram this  to reassess.  He certainly is not symptomatic at this time, and if anything, he feels better on the Entresto.  I suspect this is functional, and given his history of CAB, if he ever needs anything done, would be a MitraClip candidate.  We will repeat an echocardiogram in November and have him see Dr. Santiago.    We will have this gentleman get a BMP in 1 month.  I do not feel that I need to see him at that time unless he has concerns.  Otherwise, I anticipate the Entresto will continue to improve his symptoms.  He will follow up with Dr. Marroquin with a device check as scheduled in .  He will then get an echocardiogram and see Dr. Santiago back in November.  We will also get a BMP and a BNP at that time.  Of note, he has had longstanding platelets in the 130-140 range.  This is stable and I will defer this to his primary care provider.    Thank you for allowing me to participate in this delightful patient's care.    Danika Ye PA-C        D: 2022   T: 2022   MT: luis    Name:     SHANA TREJO  MRN:      [id number]        Account:      [id number]   :       1950           Service Date: 03/28/2022       Document: [id number]

## 2022-03-30 ENCOUNTER — CARE COORDINATION (OUTPATIENT)
Dept: CARDIOLOGY | Facility: CLINIC | Age: 72
End: 2022-03-30
Payer: MEDICARE

## 2022-03-30 DIAGNOSIS — E78.5 HYPERLIPIDEMIA LDL GOAL <100: Primary | ICD-10-CM

## 2022-03-30 RX ORDER — ATORVASTATIN CALCIUM 40 MG/1
40 TABLET, FILM COATED ORAL DAILY
Qty: 90 TABLET | Refills: 3 | Status: SHIPPED | OUTPATIENT
Start: 2022-03-30 | End: 2022-05-20

## 2022-03-30 NOTE — PROGRESS NOTES
[facility] - [facility]    Called patient w/results/recommendations per Danika More:  Hgb a1c and lipids added after clinic visit.  Lipid panel looks ok, I'd prefer his LDL lower, I recommend he increase his lipitor to 40 mg a day.  Liver is normal   A1c is 5.5 so no diabetes- good news.     Updated lipitor Rx sent and results sent to patient as well per his request.

## 2022-03-31 LAB
MDC_IDC_LEAD_IMPLANT_DT: NORMAL
MDC_IDC_LEAD_LOCATION: NORMAL
MDC_IDC_LEAD_MFG: NORMAL
MDC_IDC_LEAD_MODEL: NORMAL
MDC_IDC_LEAD_SERIAL: NORMAL
MDC_IDC_LEAD_SPECIAL_FUNCTION: NORMAL
MDC_IDC_MSMT_BATTERY_DTM: NORMAL
MDC_IDC_MSMT_BATTERY_REMAINING_LONGEVITY: 54 MO
MDC_IDC_MSMT_BATTERY_RRT_TRIGGER: 2.73
MDC_IDC_MSMT_BATTERY_STATUS: NORMAL
MDC_IDC_MSMT_BATTERY_VOLTAGE: 2.98 V
MDC_IDC_MSMT_CAP_CHARGE_DTM: NORMAL
MDC_IDC_MSMT_CAP_CHARGE_ENERGY: 18 J
MDC_IDC_MSMT_CAP_CHARGE_TIME: 3.78
MDC_IDC_MSMT_CAP_CHARGE_TYPE: NORMAL
MDC_IDC_MSMT_LEADCHNL_RV_IMPEDANCE_VALUE: 323 OHM
MDC_IDC_MSMT_LEADCHNL_RV_IMPEDANCE_VALUE: 342 OHM
MDC_IDC_MSMT_LEADCHNL_RV_PACING_THRESHOLD_AMPLITUDE: 2.38 V
MDC_IDC_MSMT_LEADCHNL_RV_PACING_THRESHOLD_PULSEWIDTH: 0.4 MS
MDC_IDC_MSMT_LEADCHNL_RV_SENSING_INTR_AMPL: 12.75 MV
MDC_IDC_MSMT_LEADCHNL_RV_SENSING_INTR_AMPL: 12.75 MV
MDC_IDC_PG_IMPLANT_DTM: NORMAL
MDC_IDC_PG_MFG: NORMAL
MDC_IDC_PG_MODEL: NORMAL
MDC_IDC_PG_SERIAL: NORMAL
MDC_IDC_PG_TYPE: NORMAL
MDC_IDC_SESS_CLINIC_NAME: NORMAL
MDC_IDC_SESS_DTM: NORMAL
MDC_IDC_SESS_TYPE: NORMAL
MDC_IDC_SET_BRADY_HYSTRATE: NORMAL
MDC_IDC_SET_BRADY_LOWRATE: 40 {BEATS}/MIN
MDC_IDC_SET_BRADY_MODE: NORMAL
MDC_IDC_SET_LEADCHNL_RV_PACING_AMPLITUDE: 5 V
MDC_IDC_SET_LEADCHNL_RV_PACING_ANODE_ELECTRODE_1: NORMAL
MDC_IDC_SET_LEADCHNL_RV_PACING_ANODE_LOCATION_1: NORMAL
MDC_IDC_SET_LEADCHNL_RV_PACING_CAPTURE_MODE: NORMAL
MDC_IDC_SET_LEADCHNL_RV_PACING_CATHODE_ELECTRODE_1: NORMAL
MDC_IDC_SET_LEADCHNL_RV_PACING_CATHODE_LOCATION_1: NORMAL
MDC_IDC_SET_LEADCHNL_RV_PACING_POLARITY: NORMAL
MDC_IDC_SET_LEADCHNL_RV_PACING_PULSEWIDTH: 0.4 MS
MDC_IDC_SET_LEADCHNL_RV_SENSING_ANODE_ELECTRODE_1: NORMAL
MDC_IDC_SET_LEADCHNL_RV_SENSING_ANODE_LOCATION_1: NORMAL
MDC_IDC_SET_LEADCHNL_RV_SENSING_CATHODE_ELECTRODE_1: NORMAL
MDC_IDC_SET_LEADCHNL_RV_SENSING_CATHODE_LOCATION_1: NORMAL
MDC_IDC_SET_LEADCHNL_RV_SENSING_POLARITY: NORMAL
MDC_IDC_SET_LEADCHNL_RV_SENSING_SENSITIVITY: 0.3 MV
MDC_IDC_SET_ZONE_DETECTION_BEATS_DENOMINATOR: 40 {BEATS}
MDC_IDC_SET_ZONE_DETECTION_BEATS_NUMERATOR: 30 {BEATS}
MDC_IDC_SET_ZONE_DETECTION_INTERVAL: 320 MS
MDC_IDC_SET_ZONE_DETECTION_INTERVAL: 360 MS
MDC_IDC_SET_ZONE_DETECTION_INTERVAL: 400 MS
MDC_IDC_SET_ZONE_DETECTION_INTERVAL: NORMAL
MDC_IDC_SET_ZONE_TYPE: NORMAL
MDC_IDC_STAT_BRADY_DTM_END: NORMAL
MDC_IDC_STAT_BRADY_DTM_START: NORMAL
MDC_IDC_STAT_BRADY_RV_PERCENT_PACED: 0.03 %
MDC_IDC_STAT_EPISODE_RECENT_COUNT: 0
MDC_IDC_STAT_EPISODE_RECENT_COUNT_DTM_END: NORMAL
MDC_IDC_STAT_EPISODE_RECENT_COUNT_DTM_START: NORMAL
MDC_IDC_STAT_EPISODE_TOTAL_COUNT: 0
MDC_IDC_STAT_EPISODE_TOTAL_COUNT: 1
MDC_IDC_STAT_EPISODE_TOTAL_COUNT: 14
MDC_IDC_STAT_EPISODE_TOTAL_COUNT_DTM_END: NORMAL
MDC_IDC_STAT_EPISODE_TOTAL_COUNT_DTM_START: NORMAL
MDC_IDC_STAT_EPISODE_TYPE: NORMAL
MDC_IDC_STAT_TACHYTHERAPY_ATP_DELIVERED_RECENT: 0
MDC_IDC_STAT_TACHYTHERAPY_ATP_DELIVERED_TOTAL: 0
MDC_IDC_STAT_TACHYTHERAPY_RECENT_DTM_END: NORMAL
MDC_IDC_STAT_TACHYTHERAPY_RECENT_DTM_START: NORMAL
MDC_IDC_STAT_TACHYTHERAPY_SHOCKS_ABORTED_RECENT: 0
MDC_IDC_STAT_TACHYTHERAPY_SHOCKS_ABORTED_TOTAL: 0
MDC_IDC_STAT_TACHYTHERAPY_SHOCKS_DELIVERED_RECENT: 0
MDC_IDC_STAT_TACHYTHERAPY_SHOCKS_DELIVERED_TOTAL: 1
MDC_IDC_STAT_TACHYTHERAPY_TOTAL_DTM_END: NORMAL
MDC_IDC_STAT_TACHYTHERAPY_TOTAL_DTM_START: NORMAL

## 2022-04-25 ENCOUNTER — TELEPHONE (OUTPATIENT)
Dept: CARDIOLOGY | Facility: CLINIC | Age: 72
End: 2022-04-25
Payer: MEDICARE

## 2022-04-25 DIAGNOSIS — I25.10 CAD (CORONARY ARTERY DISEASE): ICD-10-CM

## 2022-04-25 DIAGNOSIS — E78.5 HYPERLIPIDEMIA LDL GOAL <100: Primary | ICD-10-CM

## 2022-04-25 NOTE — TELEPHONE ENCOUNTER
"[facility]-CORE Clinic    I spoke to Osman today and he had several questions.    Last CORE visit 3/28/22 with Danika More PAC: entresto increased, continued lasix 20 mg daily. Per Danika that day, \"We will have this gentleman get a BMP in 1 month.  I do not feel that I need to see him at that time unless he has concerns.\"    1. Weight has trended up from ~176 lbs to 181 lbs since decrease in lasix 2/2022. This decrease was done to see if it would improve night time urination, and unfortunately it hasn't. Osman denied dyspnea/orthopnea (which he's had previously with fluid retention) but did note some abdominal bloating that's new. He's trying to be mindful of sodium intake.  Plan: Will route to Danika for review. Told Osman that Danika is out of office until 4/28 and asked him to call in interim if symptoms/weight increase. Note he has repeat BMP 4/28 AM.  --Asked him to try to limit his fluid intake after 6 pm    2. When should he recheck lipids?   Last checked 3/2022, and lipitor increased. Advised him that recheck will be this fall when he sees Dr. Santiago.    3. Does he need to see Danika prior to Dr. Santiago visit this fall?   Pending answer to #1 above.     Carmen Wyman RN BSN   3:16 PM 04/25/22        "

## 2022-04-25 NOTE — TELEPHONE ENCOUNTER
Health Call Center    Phone Message    May a detailed message be left on voicemail: yes     Reason for Call: Other: Tae calling to find out if Danika was going to want to retest his Lipids as he wasn't fasting at the time of his last lab draw.  He is also reporting that the decrease in lasix did not improve his evening bathroom habits but has increased his weight.  Also, he is wanting to clarify plan of care.  His follow up order state Dr. Santiago in April in CORE but he is stating that he isn't to follow up with him until November.  Please call him back to discuss    Action Taken: Message routed to:  Other: Cardiology    Travel Screening: Not Applicable

## 2022-04-26 ENCOUNTER — TELEPHONE (OUTPATIENT)
Dept: CARDIOLOGY | Facility: CLINIC | Age: 72
End: 2022-04-26
Payer: MEDICARE

## 2022-04-26 NOTE — TELEPHONE ENCOUNTER
[facility] Call Center    Phone Message    May a detailed message be left on voicemail: yes     Reason for Call: Other: Per pt got a letter form clinic to make appts - he already has all appts made is confused on if Danika wants him to see Jack in April 2022 or Nov 2022 please review and call pt back. He wants to know before making more appts.     Action Taken: Message routed to:  Other: Cardiology    Travel Screening: Not Applicable

## 2022-04-26 NOTE — TELEPHONE ENCOUNTER
I reviewed this with Osman yesterday. Please see notes from yesterday. Awaiting additional input from Danika ROBERTSON when she returns to clinic 4/28/22--will follow-up with Osman then.    Carmen Wyman RN BSN   12:12 PM 04/26/22

## 2022-04-28 ENCOUNTER — LAB (OUTPATIENT)
Dept: LAB | Facility: CLINIC | Age: 72
End: 2022-04-28
Payer: MEDICARE

## 2022-04-28 DIAGNOSIS — I50.22 CHRONIC SYSTOLIC CONGESTIVE HEART FAILURE (H): ICD-10-CM

## 2022-04-28 LAB
ANION GAP SERPL CALCULATED.3IONS-SCNC: 3 MMOL/L (ref 3–14)
BUN SERPL-MCNC: 12 MG/DL (ref 7–30)
CALCIUM SERPL-MCNC: 9.2 MG/DL (ref 8.5–10.1)
CHLORIDE BLD-SCNC: 105 MMOL/L (ref 94–109)
CO2 SERPL-SCNC: 29 MMOL/L (ref 20–32)
CREAT SERPL-MCNC: 0.72 MG/DL (ref 0.66–1.25)
GFR SERPL CREATININE-BSD FRML MDRD: >90 ML/MIN/1.73M2
GLUCOSE BLD-MCNC: 143 MG/DL (ref 70–99)
POTASSIUM BLD-SCNC: 4.1 MMOL/L (ref 3.4–5.3)
SODIUM SERPL-SCNC: 137 MMOL/L (ref 133–144)

## 2022-04-28 PROCEDURE — 36415 COLL VENOUS BLD VENIPUNCTURE: CPT

## 2022-04-28 PROCEDURE — 80048 BASIC METABOLIC PNL TOTAL CA: CPT

## 2022-05-01 NOTE — TELEPHONE ENCOUNTER
Increase lasix to 40 mg daily and repeat bmp in 2-3 weeks.  As long as wt goes down and he feels better, he can be seen by Dr. Santiago in the fall.  Since we're repeating labs in a few weeks, can recheck fasting cholesterol panel at that time as well.  Danika Ye PA-C 5/1/2022 1:17 PM

## 2022-05-02 NOTE — TELEPHONE ENCOUNTER
[facility] - CORE Clinic    -Reviewed recommendations given by Danika to increase Lasix to 40 mg daily and have a repeat BMP and fasting lipids done in 2-3 weeks.  Assisted pt with lab appointment. Orders placed.  Encouraged Tae to call the clinic with questions.     Donita Oro RN on 5/2/2022 at 2:29 PM

## 2022-05-16 ENCOUNTER — LAB (OUTPATIENT)
Dept: LAB | Facility: CLINIC | Age: 72
End: 2022-05-16
Payer: MEDICARE

## 2022-05-16 ENCOUNTER — CARE COORDINATION (OUTPATIENT)
Dept: CARDIOLOGY | Facility: CLINIC | Age: 72
End: 2022-05-16

## 2022-05-16 DIAGNOSIS — I25.10 CAD (CORONARY ARTERY DISEASE): ICD-10-CM

## 2022-05-16 DIAGNOSIS — E78.5 HYPERLIPIDEMIA LDL GOAL <100: ICD-10-CM

## 2022-05-16 LAB
ANION GAP SERPL CALCULATED.3IONS-SCNC: 5 MMOL/L (ref 3–14)
BUN SERPL-MCNC: 13 MG/DL (ref 7–30)
CALCIUM SERPL-MCNC: 9.1 MG/DL (ref 8.5–10.1)
CHLORIDE BLD-SCNC: 108 MMOL/L (ref 94–109)
CHOLEST SERPL-MCNC: 124 MG/DL
CO2 SERPL-SCNC: 26 MMOL/L (ref 20–32)
CREAT SERPL-MCNC: 0.65 MG/DL (ref 0.66–1.25)
FASTING STATUS PATIENT QL REPORTED: YES
GFR SERPL CREATININE-BSD FRML MDRD: >90 ML/MIN/1.73M2
GLUCOSE BLD-MCNC: 125 MG/DL (ref 70–99)
HDLC SERPL-MCNC: 58 MG/DL
LDLC SERPL CALC-MCNC: 42 MG/DL
NONHDLC SERPL-MCNC: 66 MG/DL
POTASSIUM BLD-SCNC: 3.8 MMOL/L (ref 3.4–5.3)
SODIUM SERPL-SCNC: 139 MMOL/L (ref 133–144)
TRIGL SERPL-MCNC: 120 MG/DL

## 2022-05-16 PROCEDURE — 80061 LIPID PANEL: CPT

## 2022-05-16 PROCEDURE — 80048 BASIC METABOLIC PNL TOTAL CA: CPT

## 2022-05-16 PROCEDURE — 36415 COLL VENOUS BLD VENIPUNCTURE: CPT

## 2022-05-16 NOTE — PROGRESS NOTES
Danika Ye PA-C   5/16/2022  2:33 PM CDT         Labs look great- cholesterol is good, kidney function looks good.  Please continue current medications.       5/16/22 Call to patient with Cholesterol labs and Danika Ye PA-C comments. Wife reports patient out mowing law now, but last week had one dizzy spell and fell in bathroom. Wife will have patient call back to review.  Vilma Fitch RN 05/16/22 4:00 PM    5/18/2022 Call to patient. Tae reports having two episodes over a week ago of dizziness and nausea and fell in the bathroom after turning around off the commode to throw up. Reports leans to the right when he feels unsteady. Denies syncope or presyncope. Before this walked into garage door when getting mail, not focusing where he was going. Notes feeling weak in legs and lower back and wants to go back down on lipitor to 20 mg daily as not comfortable with higher dose. Patient reports he will be going back down on lipitor to 20 mg dialy and will be calling with update if that makes him feel better. Does not check home blood pressures or heart rates.Mowed the lawn Monday and felt good without complaints. Has PMD visit with new MD 6/15/22.   Update to Danika to review.  Vilma Fitch RN 05/18/22 2:57 PM    Future Appointments   Date Time Provider Department Center   6/15/2022 10:30 AM Rory Gambino MD [facility]    6/28/2022 10:[facility]   6/28/2022 11:15 AM Sandoval Marroquin MD [facility] PSA CLIN

## 2022-05-18 NOTE — PROGRESS NOTES
5/18/2022 Called to patient with Danika Ye PA-C response. Tae will contact his [facility] care tomorrow to discuss his symptoms and to see if they can move up his visit from 6/15.  Vilma Fitch RN 05/18/22 4:31 PM

## 2022-05-18 NOTE — PROGRESS NOTES
Update appreciated.   Unclear if lipitor is cause of dizziness, imbalance and nausea.  If that does not improve rapidly, needs to be seen by PCP for other possible cause.  Danika Ye PA-C 5/18/2022 4:07 PM

## 2022-05-19 ENCOUNTER — NURSE TRIAGE (OUTPATIENT)
Dept: FAMILY MEDICINE | Facility: CLINIC | Age: 72
End: 2022-05-19
Payer: MEDICARE

## 2022-05-19 NOTE — TELEPHONE ENCOUNTER
"Patient has had multiple falls at home. He is completing ADLs and will feel weakness in his legs and will fall. No injuries with falls.     He is concerned with his falling and weakness.     Appointment scheduled to be seen tomorrow in clinic . Recent labs are normal.     He denies any changes in vision, weakness in arms, headache or speech.     Bhumika Briceño RN  [facility]                 Reason for Disposition    [1] MODERATE weakness (i.e., interferes with work, school, normal activities) AND [2] persists > 3 days    [1] MILD weakness (i.e., does not interfere with ability to work, go to school, normal activities) AND [2] persists > 1 week    Additional Information    Negative: Severe difficulty breathing (e.g., struggling for each breath, speaks in single words)    Negative: Shock suspected (e.g., cold/pale/clammy skin, too weak to stand, low BP, rapid pulse)    Negative: Difficult to awaken or acting confused (e.g., disoriented, slurred speech)    Negative: [1] Fainted > 15 minutes ago AND [2] still feels too weak or dizzy to stand    Negative: [1] SEVERE weakness (i.e., unable to walk or barely able to walk, requires support) AND [2] new onset or worsening    Negative: Sounds like a life-threatening emergency to the triager    Negative: Weakness of the face, arm or leg on one side of the body    Negative: [1] Has diabetes (diabetes mellitus) AND [2] weakness from low blood sugar (i.e., < 60 mg/dl or 3.5 mmol/l)    Negative: Heat exhaustion suspected (i.e., dehydration from heat exposure)    Negative: Chest pain    Negative: Vomiting is main symptom    Negative: Diarrhea is main symptom    Negative: Difficulty breathing    Negative: Heart beating < 50 beats per minute OR > 140 beats per minute    Negative: Extra heart beats OR irregular heart beating   (i.e., \"palpitations\")    Negative: Follows bleeding (e.g., from vomiting, rectum, vagina; Exception: small brief weakness from sight of a small " "amount blood)    Negative: Black or tarry bowel movements    Negative: [1] Drinking very little AND [2] dehydration suspected (e.g., no urine > 12 hours, very dry mouth, very lightheaded)    Negative: Patient sounds very sick or weak to the triager    Negative: [1] MODERATE weakness (i.e., interferes with work, school, normal activities) AND [2] cause unknown  (Exceptions: weakness with acute minor illness, or weakness from poor fluid intake)    Negative: [1] MODERATE weakness AND [2] from poor fluid intake AND [3] no improvement after 2 hours of rest and fluids    Negative: [1] Fever > 103 F (39.4 C) AND [2] not able to get the fever down using Fever Care Advice    Negative: [1] Fever > 101 F (38.3 C) AND [2] age > 60    Negative: [1] Fever > 100.0 F (37.8 C) AND [2] bedridden (e.g., nursing home patient, CVA, chronic illness, recovering from surgery)    Negative: [1] Fever > 100.0 F (37.8 C) AND [2] diabetes mellitus or weak immune system (e.g., HIV positive, cancer chemo, splenectomy, organ transplant, chronic steroids)    Negative: Pale skin (pallor)    Negative: Taking a medicine that could cause weakness (e.g., blood pressure medications, diuretics)    Negative: [1] Fatigue (i.e., tires easily, decreased energy) AND [2] persists > 1 week    Negative: Weakness is a chronic symptom (recurrent or ongoing AND present > 4 weeks)    Negative: Fatigue is a chronic symptom (recurrent or ongoing AND present > 4 weeks)    Negative: Poor fluid intake probably caused the weakness    Negative: Mild weakness or fatigue with acute minor illness (e.g., colds)    Answer Assessment - Initial Assessment Questions  1. DESCRIPTION: \"Describe how you are feeling.\"      Okay today  2. SEVERITY: \"How bad is it?\"  \"Can you stand and walk?\"    - MILD - Feels weak or tired, but does not interfere with work, school or normal activities    - MODERATE - Able to stand and walk; weakness interferes with work, school, or normal activities    " "- SEVERE - Unable to stand or walk      No weakness now  3. ONSET:  \"When did the weakness begin?\"      One week ago.   4. CAUSE: \"What do you think is causing the weakness?\"      Not sure.  5. MEDICINES: \"Have you recently started a new medicine or had a change in the amount of a medicine?\"      Yes, statin.   6. OTHER SYMPTOMS: \"Do you have any other symptoms?\" (e.g., chest pain, fever, cough, SOB, vomiting, diarrhea, bleeding, other areas of pain)      No  7. PREGNANCY: \"Is there any chance you are pregnant?\" \"When was your last menstrual period?\"      No    Protocols used: WEAKNESS (GENERALIZED) AND FATIGUE-MADELYN Briceño RN  -[facility]     "

## 2022-05-19 NOTE — PROGRESS NOTES
"  Assessment & Plan     Traumatic injury of head, initial encounter  Given constellation of sx will check CT head in this patient on daily aspirin.    - CT Head w/o Contrast    Eczema, unspecified type  Med refilled per request.    - hydrocortisone (WESTCORT) 0.2 % external cream  Dispense: 45 g; Refill: 1    Hyperlipidemia LDL goal <100  Has self reduced lipitor to 20mg. I think that's reasonable.  Comparing lipid panel from March (prior to increase) and current there hasn't been an incredible change.    - atorvastatin (LIPITOR) 40 MG tablet  Dispense: 90 tablet; Refill: 3     BMI:   Estimated body mass index is 28.79 kg/m  as calculated from the following:    Height as of 3/28/22: 1.715 m (5' 7.5\").    Weight as of this encounter: 84.6 kg (186 lb 9.6 oz).       No follow-ups on file.    Rory Gambino MD  [facility] NATALIE Strong is a 71 year old who presents for the following health issues     On 5/11 he walked into his garage door.  Bridge of nose hit along with forehead.  No LOC.  That night he felt off balance when he was going to toilet.  The next morning he felt nausea, had dry heaves.  Lasted a few hours and then improved with time and ginger ale.      On 5/16 he noted some leg weakness.  'wobbly' after mowing the lawn and pulling weeds.  Has since felt his normal self. No concerns.     He notes statin was increased a few weeks ago.      He denies headache, chest pain, palpitations, cough, GI or  changes.      HPI     Chief Complaint   Patient presents with     Fall     Patient has had multiple falls at home. He is completing ADLs and will feel weakness in his legs and will fall. No injuries with falls.         Medication Follow-up     atorvastatin (LIPITOR) 40 MG tablet pt report lower back and leg shaking after starting taking medication              Review of Systems   As above      Objective    /79 (BP Location: Left arm, Patient Position: Sitting, Cuff Size: Adult " Large)   Pulse 77   Temp 97.1  F (36.2  C) (Temporal)   Resp 16   Wt 84.6 kg (186 lb 9.6 oz)   SpO2 98%   BMI 28.79 kg/m    Body mass index is 28.79 kg/m .  Physical Exam   GENERAL: healthy, alert and no distress  NECK: no adenopathy, no asymmetry, masses, or scars and thyroid normal to palpation  RESP: lungs clear to auscultation - no rales, rhonchi or wheezes  CV: regular rate and rhythm, normal S1 S2, no S3 or S4, no murmur, click or rub, no peripheral edema and peripheral pulses strong  ABDOMEN: soft, nontender, no hepatosplenomegaly, no masses and bowel sounds normal  MS: no gross musculoskeletal defects noted, no edema

## 2022-05-20 ENCOUNTER — ANCILLARY PROCEDURE (OUTPATIENT)
Dept: CT IMAGING | Facility: CLINIC | Age: 72
End: 2022-05-20
Attending: INTERNAL MEDICINE
Payer: MEDICARE

## 2022-05-20 ENCOUNTER — OFFICE VISIT (OUTPATIENT)
Dept: FAMILY MEDICINE | Facility: CLINIC | Age: 72
End: 2022-05-20
Payer: MEDICARE

## 2022-05-20 VITALS
RESPIRATION RATE: 16 BRPM | SYSTOLIC BLOOD PRESSURE: 136 MMHG | TEMPERATURE: 97.1 F | WEIGHT: 186.6 LBS | BODY MASS INDEX: 28.79 KG/M2 | DIASTOLIC BLOOD PRESSURE: 79 MMHG | OXYGEN SATURATION: 98 % | HEART RATE: 77 BPM

## 2022-05-20 DIAGNOSIS — L30.9 ECZEMA, UNSPECIFIED TYPE: ICD-10-CM

## 2022-05-20 DIAGNOSIS — S09.90XA TRAUMATIC INJURY OF HEAD, INITIAL ENCOUNTER: ICD-10-CM

## 2022-05-20 DIAGNOSIS — E78.5 HYPERLIPIDEMIA LDL GOAL <100: ICD-10-CM

## 2022-05-20 DIAGNOSIS — S09.90XA TRAUMATIC INJURY OF HEAD, INITIAL ENCOUNTER: Primary | ICD-10-CM

## 2022-05-20 PROCEDURE — G1004 CDSM NDSC: HCPCS

## 2022-05-20 PROCEDURE — 99214 OFFICE O/P EST MOD 30 MIN: CPT | Performed by: INTERNAL MEDICINE

## 2022-05-20 RX ORDER — ATORVASTATIN CALCIUM 40 MG/1
20 TABLET, FILM COATED ORAL DAILY
Qty: 90 TABLET | Refills: 3 | COMMUNITY
Start: 2022-05-20 | End: 2022-06-15

## 2022-05-20 RX ORDER — HYDROCORTISONE VALERATE CREAM 2 MG/G
CREAM TOPICAL
Qty: 45 G | Refills: 1 | Status: SHIPPED | OUTPATIENT
Start: 2022-05-20 | End: 2022-06-15

## 2022-05-20 ASSESSMENT — PAIN SCALES - GENERAL: PAINLEVEL: MILD PAIN (2)

## 2022-05-21 ENCOUNTER — TELEPHONE (OUTPATIENT)
Dept: FAMILY MEDICINE | Facility: CLINIC | Age: 72
End: 2022-05-21
Payer: MEDICARE

## 2022-05-21 DIAGNOSIS — L21.9 SEBORRHEIC DERMATITIS: Primary | ICD-10-CM

## 2022-05-21 NOTE — TELEPHONE ENCOUNTER
Disp Refills Start End OPAL   hydrocortisone (WESTCORT) 0.2 % external cream 45 g 1 5/20/2022  No   Sig: as needed   Sent to pharmacy as: Hydrocortisone Valerate 0.2 % External Cream (WESTCORT)     Pharmacy states above is not covered for pt    Pharmacy lists the following as covered alternatives  Triamcinolone cream   Mometasone cream  Betamethasone cream   Fluticasone cream   Fluocinonide cream

## 2022-05-23 RX ORDER — TRIAMCINOLONE ACETONIDE 1 MG/G
CREAM TOPICAL 2 TIMES DAILY
Qty: 45 G | Refills: 1 | Status: SHIPPED | OUTPATIENT
Start: 2022-05-23

## 2022-06-15 ENCOUNTER — OFFICE VISIT (OUTPATIENT)
Dept: FAMILY MEDICINE | Facility: CLINIC | Age: 72
End: 2022-06-15
Payer: MEDICARE

## 2022-06-15 VITALS
OXYGEN SATURATION: 100 % | TEMPERATURE: 97.1 F | HEART RATE: 70 BPM | SYSTOLIC BLOOD PRESSURE: 129 MMHG | DIASTOLIC BLOOD PRESSURE: 73 MMHG | BODY MASS INDEX: 28.43 KG/M2 | WEIGHT: 187.6 LBS | RESPIRATION RATE: 16 BRPM | HEIGHT: 68 IN

## 2022-06-15 DIAGNOSIS — Z23 HIGH PRIORITY FOR 2019-NCOV VACCINE: ICD-10-CM

## 2022-06-15 DIAGNOSIS — E78.5 HYPERLIPIDEMIA LDL GOAL <100: Primary | ICD-10-CM

## 2022-06-15 DIAGNOSIS — S09.90XD TRAUMATIC INJURY OF HEAD, SUBSEQUENT ENCOUNTER: ICD-10-CM

## 2022-06-15 DIAGNOSIS — I10 HTN (HYPERTENSION), BENIGN: ICD-10-CM

## 2022-06-15 PROCEDURE — 99214 OFFICE O/P EST MOD 30 MIN: CPT | Mod: 25 | Performed by: INTERNAL MEDICINE

## 2022-06-15 PROCEDURE — 0054A COVID-19,PF,PFIZER (12+ YRS): CPT | Performed by: INTERNAL MEDICINE

## 2022-06-15 PROCEDURE — 91305 COVID-19,PF,PFIZER (12+ YRS): CPT | Performed by: INTERNAL MEDICINE

## 2022-06-15 RX ORDER — ATORVASTATIN CALCIUM 20 MG/1
20 TABLET, FILM COATED ORAL DAILY
COMMUNITY
Start: 2022-06-15 | End: 2022-09-01

## 2022-06-15 ASSESSMENT — PAIN SCALES - GENERAL: PAINLEVEL: NO PAIN (0)

## 2022-06-15 NOTE — PROGRESS NOTES
"  Assessment & Plan     Hyperlipidemia LDL goal <100  On 20mg a day.  Med list updated.      HTN (hypertension), benign  Stable on current regimen. Continue.      Traumatic injury of head, subsequent encounter  Improved.  Scan reviewed.  Fall precautions discussed.        RTC 6M routine visit/med check.       BMI:   Estimated body mass index is 28.95 kg/m  as calculated from the following:    Height as of this encounter: 1.715 m (5' 7.5\").    Weight as of this encounter: 85.1 kg (187 lb 9.6 oz).       Return in about 6 months (around 12/15/2022) for Follow up.    Rory Gambino MD  [facility]    Chiara Strong is a 71 year old who presents for the following health issues  accompanied by his spouse.    HPI   Chief Complaint   Patient presents with     Establish Care     I saw Tae recently after head trauma.  He was feeling leg weakness and dizziness.  Sx have since abated.  CT reviewed    HTN  The patient has a history of hypertension.  Blood pressure is noted as below.  Compliance with medication is noted.  Side effects of medication are not described.  Symptoms of uncontrolled hypertension including chest pain, dizziness, and vision changes have not been observed.    HL:  Self lowered lipitor to 20mg.  Stable.  He feels this was contributing to leg weakness.  Last lipids reviewed        Review of Systems         Objective    BP (!) 144/78 (BP Location: Left arm, Patient Position: Sitting, Cuff Size: Adult Regular)   Pulse 70   Temp 97.1  F (36.2  C) (Temporal)   Resp 16   Ht 1.715 m (5' 7.5\")   Wt 85.1 kg (187 lb 9.6 oz)   SpO2 100%   BMI 28.95 kg/m    Body mass index is 28.95 kg/m .  Physical Exam   GENERAL: healthy, alert and no distress  NECK: no adenopathy, no asymmetry, masses, or scars and thyroid normal to palpation  RESP: lungs clear to auscultation - no rales, rhonchi or wheezes  CV: regular rate and rhythm, normal S1 S2, no S3 or S4, no murmur, click or rub, no " peripheral edema and peripheral pulses strong  ABDOMEN: soft, nontender, no hepatosplenomegaly, no masses and bowel sounds normal  MS: no gross musculoskeletal defects noted, no edema

## 2022-06-16 ENCOUNTER — TELEPHONE (OUTPATIENT)
Dept: CARDIOLOGY | Facility: CLINIC | Age: 72
End: 2022-06-16
Payer: MEDICARE

## 2022-06-16 NOTE — TELEPHONE ENCOUNTER
[facility] - CORE Clinic    -Tae had a question regarding upcoming labs BMP and BNP are both ordered. He should have them drawn as scheduled prior to his Jack appt. He also wanted to let us know his New PCP had him decrease his lipitor to 20 mg daily due to back and leg soreness.     Future Appointments   Date Time Provider Department Center   6/28/2022 10:30 AM ALMEIDA DCRN [facility] CLIN   6/28/2022 11:15 AM Sandoval Marroquin MD [facility] PSA CLIN   10/31/2022 10:00 AM [id number] [facility]   10/31/2022 11:00 AM ALMEIDA LAB [facility]   11/1/2022  2:15 PM Jono Santiago MD [facility] CLIN   12/16/2022  3:00 PM [facility]         Donita Oro RN on 6/16/2022 at 3:36 PM

## 2022-06-16 NOTE — TELEPHONE ENCOUNTER
[facility] Call Center    Phone Message    May a detailed message be left on voicemail: yes     Reason for Call: Other: Tae calling to get clarification on what labs are needed and what type of lab that has been ordered already for his November appointment with Dr. Santiago.  This writer did look at the last OV with Danika, who stated check BNP and BMP but only the BNP has been ordered.  Please call him back to discuss what type of lab the BNP is.  Thank you!     Action Taken: Message routed to:  Other: Cardiology    Travel Screening: Not Applicable

## 2022-06-28 ENCOUNTER — OFFICE VISIT (OUTPATIENT)
Dept: CARDIOLOGY | Facility: CLINIC | Age: 72
End: 2022-06-28
Attending: INTERNAL MEDICINE
Payer: MEDICARE

## 2022-06-28 VITALS
SYSTOLIC BLOOD PRESSURE: 126 MMHG | OXYGEN SATURATION: 97 % | WEIGHT: 185.7 LBS | HEIGHT: 68 IN | BODY MASS INDEX: 28.14 KG/M2 | HEART RATE: 68 BPM | DIASTOLIC BLOOD PRESSURE: 64 MMHG

## 2022-06-28 DIAGNOSIS — Z95.810 ICD (IMPLANTABLE CARDIOVERTER-DEFIBRILLATOR) IN PLACE: ICD-10-CM

## 2022-06-28 DIAGNOSIS — I25.10 CORONARY ARTERY DISEASE INVOLVING NATIVE CORONARY ARTERY OF NATIVE HEART WITHOUT ANGINA PECTORIS: ICD-10-CM

## 2022-06-28 DIAGNOSIS — E78.5 HYPERLIPIDEMIA LDL GOAL <100: Primary | ICD-10-CM

## 2022-06-28 DIAGNOSIS — I10 HTN (HYPERTENSION), BENIGN: ICD-10-CM

## 2022-06-28 DIAGNOSIS — I50.22 CHRONIC SYSTOLIC CONGESTIVE HEART FAILURE (H): ICD-10-CM

## 2022-06-28 DIAGNOSIS — I25.5 ISCHEMIC CARDIOMYOPATHY: Primary | ICD-10-CM

## 2022-06-28 DIAGNOSIS — I25.5 ISCHEMIC CARDIOMYOPATHY: ICD-10-CM

## 2022-06-28 PROCEDURE — 99214 OFFICE O/P EST MOD 30 MIN: CPT | Mod: 25 | Performed by: INTERNAL MEDICINE

## 2022-06-28 PROCEDURE — 93282 PRGRMG EVAL IMPLANTABLE DFB: CPT | Performed by: INTERNAL MEDICINE

## 2022-06-28 NOTE — LETTER
"6/28/2022    Rory Gambino MD  [address] [place] [address]    RE: Tae ISAAC Kindra       Dear Colleague,     I had the pleasure of seeing Tae Barrioskatelyn in the [facility].  Electrophysiology/ Clinic Note         H&P and Plan:     REASON FOR VISIT: Electrophysiology evaluation.      HISTORY OF PRESENT ILLNESS: Patient is a pleasant 71-year-old gentleman with a history of hypertension, hyperlipidemia and heart failure secondary to ischemic cardiomyopathy (CABG and ICD implantation in 1997; generator change 2015), who is here for routine follow-up.      Today, he informs he is doing well.  He continues to exercise on a treadmill regularly, and he does not have any major complaints during this visit.  He denies symptoms such as chest pain, shortness of breath, headedness, near-syncope or syncope.     Device was interrogated today.  Patient is known to have chronic elevated RV lead threshold, and parameters seems to be stable. No ventricular arrhythmia was seen.     PREVIOUS STUDIES (personally reviewed):  -Echo (2018): Severe LV dysfunction.  EF estimated 32%.  There was moderate MR  -Stress PET (2018): Large area of nontransmural/transmural infarct in the basal, mid and apical anteroseptal wall.  No ischemia seen.  -Echo (11/10/2021): Severely dysfunction.  EF of 25-30%.  There was moderate to severe MR and mild to moderate TR.     ASSESSMENT AND PLAN:   1. Device care.    Lead parameters are stable.  We will continue device checks every 3 months.    2. Heart failure secondary ischemic cardiomyopathy.  Euvolemic on physical exam. We will continue therapy with Entresto, lasix,  metoprolol and Coumadin.  He will continue follow-up with heart failure team.        Sandoval Marroquin MD    Physical Exam:  Vitals: /64   Pulse 68   Ht 1.715 m (5' 7.5\")   Wt 84.2 kg (185 lb 11.2 oz)   SpO2 97%   BMI 28.66 kg/m      Constitutional:  AAO x3.  Pt is in NAD.  HEAD: normocephalic.  SKIN: " Skin normal color, texture and turgor with no lesions or eruptions.  Eyes: PERRL, EOMI.  ENT:  Supple, normal JVP. No lymphadenopathy or thyroid enlargement.  Chest:  CTAB.  Cardiac: RRR, normal  S1 and S2.  Systolic murmur in LLSB II/VI.  Abdomen:  Normal BS.  Soft, non-tender and non-distended.  No rebound or guarding.    Extremities:  Pedious pulses palpable B/L.  No LE edema noticed.   Neurological: Strength and sensation grossly symmetric and intact throughout.       CURRENT MEDICATIONS:  Current Outpatient Medications   Medication Sig Dispense Refill     aspirin (ASA) 81 MG chewable tablet Take 1 tablet (81 mg) by mouth daily       atorvastatin (LIPITOR) 20 MG tablet Take 1 tablet (20 mg) by mouth daily       Cholecalciferol (VITAMIN D) 1000 UNITS capsule Take 1 capsule by mouth daily.       fluocinolone (SYNALAR) 0.01 % solution PRN 60 mL 1     furosemide (LASIX) 40 MG tablet Take 0.5 tablets (20 mg) by mouth daily 90 tablet 3     metoprolol succinate ER (TOPROL XL) 200 MG 24 hr tablet Take 1 tablet (200 mg) by mouth daily 90 tablet 3     multivitamin w/minerals (THERA-VIT-M) tablet Take 1 tablet by mouth daily.       NIACIN 750 MG OR TBCR 2 tabs qhs       nitroGLYcerin (NITROSTAT) 0.4 MG sublingual tablet Place 1 tablet (0.4 mg) under the tongue every 5 minutes as needed for chest pain 30 tablet 11     sacubitril-valsartan (ENTRESTO)  MG per tablet Take 1 tablet by mouth 2 times daily 180 tablet 3     spironolactone (ALDACTONE) 25 MG tablet Take 1 tablet (25 mg) by mouth daily 90 tablet 3     triamcinolone (KENALOG) 0.1 % external cream Apply topically 2 times daily 45 g 1       ALLERGIES     Allergies   Allergen Reactions     Lactose      Septra [Sulfa Drugs]      Sulfamethoxazole-Trimethoprim Itching and Rash       PAST MEDICAL HISTORY:  Past Medical History:   Diagnosis Date     BPH (benign prostatic hyperplasia)     Dr. Whitfield, PSA 0.27 12/2012     CAD (coronary artery disease) 1997    MI      CHF  (congestive heart failure) (H)      Colon polyp      History of smoking     Quit      HTN (hypertension), benign      Hyperlipidemia LDL goal <100      Ischemic cardiomyopathy     Dr. Alva     Lichen planus      Normal colonoscopy 1-3-08    Repeat 5-10 years     Pacemaker 2005    ventricular tachycardia     Pleural effusion      Pneumonia      Pneumothorax     plus rib fx secondary to fall (L)     Seborrheic dermatitis      Toe fracture, right        PAST SURGICAL HISTORY:  Past Surgical History:   Procedure Laterality Date     COLONOSCOPY N/A 2015    Procedure: COLONOSCOPY;  Surgeon: Zac Austin MD;  Location:  GI     MOUTH SURGERY      Upper and lower dentures     ZZC CABG, ARTERIAL, TWO         FAMILY HISTORY:  Family History   Problem Relation Age of Onset     Diabetes Father      Cancer Father 73        lung and bone ca     Diabetes Sister      Kidney Cancer Sister 77     Breast Cancer Sister      Uterine Cancer Sister      Hypertension Mother      Cerebrovascular Disease Mother          age 75     Thyroid Disease Mother      Kidney Cancer Brother      Diabetes Brother      Heart Defect Brother        SOCIAL HISTORY:  Social History     Socioeconomic History     Marital status:    Tobacco Use     Smoking status: Former Smoker     Quit date: 10/23/1997     Years since quittin.6     Smokeless tobacco: Never Used   Substance and Sexual Activity     Alcohol use: Yes     Alcohol/week: 0.0 standard drinks     Comment: 7 beers a week     Drug use: No     Sexual activity: Yes     Partners: Female   Social History Narrative    , wife Nadira.       Review of Systems:  Skin:        Eyes:       ENT:       Respiratory:       Cardiovascular:       Gastroenterology:      Genitourinary:       Musculoskeletal:       Neurologic:       Psychiatric:       Heme/Lymph/Imm:       Endocrine:           Recent Lab Results:  LIPID RESULTS:  Lab Results   Component Value  Date    CHOL 124 05/16/2022    CHOL 157 01/06/2021    HDL 58 05/16/2022    HDL 53 01/06/2021    LDL 42 05/16/2022    LDL 56 01/06/2021    TRIG 120 05/16/2022    TRIG 242 (H) 01/06/2021       LIVER ENZYME RESULTS:  Lab Results   Component Value Date    AST 29 01/06/2021    ALT 31 03/25/2022    ALT 26 01/06/2021       CBC RESULTS:  Lab Results   Component Value Date    WBC 7.9 03/25/2022    WBC 7.1 11/13/2019    RBC 4.02 (L) 03/25/2022    RBC 3.90 (L) 11/13/2019    HGB 14.7 03/25/2022    HGB 14.4 11/13/2019    HCT 42.4 03/25/2022    HCT 41.3 11/13/2019     (H) 03/25/2022     (H) 11/13/2019    MCH 36.6 (H) 03/25/2022    MCH 36.9 (H) 11/13/2019    MCHC 34.7 03/25/2022    MCHC 34.9 11/13/2019    RDW 11.3 03/25/2022    RDW 11.4 11/13/2019     (L) 03/25/2022     (L) 11/13/2019       BMP RESULTS:  Lab Results   Component Value Date     05/16/2022     11/13/2019    POTASSIUM 3.8 05/16/2022    POTASSIUM 4.2 01/06/2021    CHLORIDE 108 05/16/2022    CHLORIDE 103 11/13/2019    CO2 26 05/16/2022    CO2 25 11/13/2019    ANIONGAP 5 05/16/2022    ANIONGAP 7 11/13/2019     (H) 05/16/2022     (A) 01/06/2021    BUN 13 05/16/2022    BUN 17 11/13/2019    CR 0.65 (L) 05/16/2022    CR 0.77 01/06/2021    GFRESTIMATED >90 05/16/2022    GFRESTIMATED >60 01/06/2021    GFRESTBLACK >60 01/06/2021    SALOME 9.1 05/16/2022    SALOME 9.2 11/13/2019        A1C RESULTS:  Lab Results   Component Value Date    A1C 5.5 03/25/2022    A1C 5.2 11/12/2018       INR RESULTS:  Lab Results   Component Value Date    INR 3.0 (H) 11/16/2021    INR 3.2 (H) 11/02/2021    INR 2.00 (H) 06/29/2021    INR 2.10 (H) 06/15/2021         ECHOCARDIOGRAM  No results found for this or any previous visit (from the past 8760 hour(s)).      No orders of the defined types were placed in this encounter.    No orders of the defined types were placed in this encounter.    There are no discontinued medications.      Encounter Diagnoses    Name Primary?     ICD (implantable cardioverter-defibrillator) in place      Hyperlipidemia LDL goal <100 Yes     Coronary artery disease involving native coronary artery of native heart without angina pectoris      Chronic systolic congestive heart failure (H)      Ischemic cardiomyopathy          CC  Sandoval Marroquin MD  [address]  [place] [address]    Thank you for allowing me to participate in the care of your patient.      Sincerely,     Sandoval Marroquin MD     [facility]

## 2022-06-28 NOTE — PROGRESS NOTES
"Electrophysiology/ Clinic Note         H&P and Plan:     REASON FOR VISIT: Electrophysiology evaluation.      HISTORY OF PRESENT ILLNESS: Patient is a pleasant 71-year-old gentleman with a history of hypertension, hyperlipidemia and heart failure secondary to ischemic cardiomyopathy (CABG and ICD implantation in 1997; generator change 2015), who is here for routine follow-up.      Today, he informs he is doing well.  He continues to exercise on a treadmill regularly, and he does not have any major complaints during this visit.  He denies symptoms such as chest pain, shortness of breath, headedness, near-syncope or syncope.     Device was interrogated today.  Patient is known to have chronic elevated RV lead threshold, and parameters seems to be stable. No ventricular arrhythmia was seen.     PREVIOUS STUDIES (personally reviewed):  -Echo (2018): Severe LV dysfunction.  EF estimated 32%.  There was moderate MR  -Stress PET (2018): Large area of nontransmural/transmural infarct in the basal, mid and apical anteroseptal wall.  No ischemia seen.  -Echo (11/10/2021): Severely dysfunction.  EF of 25-30%.  There was moderate to severe MR and mild to moderate TR.     ASSESSMENT AND PLAN:   1. Device care.    Lead parameters are stable.  We will continue device checks every 3 months.    2. Heart failure secondary ischemic cardiomyopathy.  Euvolemic on physical exam. We will continue therapy with Entresto, lasix,  metoprolol and Coumadin.  He will continue follow-up with heart failure team.        Sandoval Marroquin MD    Physical Exam:  Vitals: /64   Pulse 68   Ht 1.715 m (5' 7.5\")   Wt 84.2 kg (185 lb 11.2 oz)   SpO2 97%   BMI 28.66 kg/m      Constitutional:  AAO x3.  Pt is in NAD.  HEAD: normocephalic.  SKIN: Skin normal color, texture and turgor with no lesions or eruptions.  Eyes: PERRL, EOMI.  ENT:  Supple, normal JVP. No lymphadenopathy or thyroid enlargement.  Chest:  CTAB.  Cardiac: RRR, normal  S1 and S2.  " Systolic murmur in LLSB II/VI.  Abdomen:  Normal BS.  Soft, non-tender and non-distended.  No rebound or guarding.    Extremities:  Pedious pulses palpable B/L.  No LE edema noticed.   Neurological: Strength and sensation grossly symmetric and intact throughout.       CURRENT MEDICATIONS:  Current Outpatient Medications   Medication Sig Dispense Refill     aspirin (ASA) 81 MG chewable tablet Take 1 tablet (81 mg) by mouth daily       atorvastatin (LIPITOR) 20 MG tablet Take 1 tablet (20 mg) by mouth daily       Cholecalciferol (VITAMIN D) 1000 UNITS capsule Take 1 capsule by mouth daily.       fluocinolone (SYNALAR) 0.01 % solution PRN 60 mL 1     furosemide (LASIX) 40 MG tablet Take 0.5 tablets (20 mg) by mouth daily 90 tablet 3     metoprolol succinate ER (TOPROL XL) 200 MG 24 hr tablet Take 1 tablet (200 mg) by mouth daily 90 tablet 3     multivitamin w/minerals (THERA-VIT-M) tablet Take 1 tablet by mouth daily.       NIACIN 750 MG OR TBCR 2 tabs qhs       nitroGLYcerin (NITROSTAT) 0.4 MG sublingual tablet Place 1 tablet (0.4 mg) under the tongue every 5 minutes as needed for chest pain 30 tablet 11     sacubitril-valsartan (ENTRESTO)  MG per tablet Take 1 tablet by mouth 2 times daily 180 tablet 3     spironolactone (ALDACTONE) 25 MG tablet Take 1 tablet (25 mg) by mouth daily 90 tablet 3     triamcinolone (KENALOG) 0.1 % external cream Apply topically 2 times daily 45 g 1       ALLERGIES     Allergies   Allergen Reactions     Lactose      Septra [Sulfa Drugs]      Sulfamethoxazole-Trimethoprim Itching and Rash       PAST MEDICAL HISTORY:  Past Medical History:   Diagnosis Date     BPH (benign prostatic hyperplasia)     Dr. Whitfield, PSA 0.27 12/2012     CAD (coronary artery disease) 1997    MI      CHF (congestive heart failure) (H)      Colon polyp      History of smoking     Quit 1997     HTN (hypertension), benign      Hyperlipidemia LDL goal <100      Ischemic cardiomyopathy     Dr. Alva     Lichen  planus      Normal colonoscopy 1-3-08    Repeat 5-10 years     Pacemaker ,     ventricular tachycardia     Pleural effusion      Pneumonia      Pneumothorax     plus rib fx secondary to fall (L)     Seborrheic dermatitis      Toe fracture, right        PAST SURGICAL HISTORY:  Past Surgical History:   Procedure Laterality Date     COLONOSCOPY N/A 2015    Procedure: COLONOSCOPY;  Surgeon: Zac Austin MD;  Location:  GI     MOUTH SURGERY      Upper and lower dentures     ZZC CABG, ARTERIAL, TWO         FAMILY HISTORY:  Family History   Problem Relation Age of Onset     Diabetes Father      Cancer Father 73        lung and bone ca     Diabetes Sister      Kidney Cancer Sister 77     Breast Cancer Sister      Uterine Cancer Sister      Hypertension Mother      Cerebrovascular Disease Mother          age 75     Thyroid Disease Mother      Kidney Cancer Brother      Diabetes Brother      Heart Defect Brother        SOCIAL HISTORY:  Social History     Socioeconomic History     Marital status:    Tobacco Use     Smoking status: Former Smoker     Quit date: 10/23/1997     Years since quittin.6     Smokeless tobacco: Never Used   Substance and Sexual Activity     Alcohol use: Yes     Alcohol/week: 0.0 standard drinks     Comment: 7 beers a week     Drug use: No     Sexual activity: Yes     Partners: Female   Social History Narrative    , wife Nadira.       Review of Systems:  Skin:        Eyes:       ENT:       Respiratory:       Cardiovascular:       Gastroenterology:      Genitourinary:       Musculoskeletal:       Neurologic:       Psychiatric:       Heme/Lymph/Imm:       Endocrine:           Recent Lab Results:  LIPID RESULTS:  Lab Results   Component Value Date    CHOL 124 2022    CHOL 157 2021    HDL 58 2022    HDL 53 2021    LDL 42 2022    LDL 56 2021    TRIG 120 2022    TRIG 242 (H) 2021       LIVER ENZYME  RESULTS:  Lab Results   Component Value Date    AST 29 01/06/2021    ALT 31 03/25/2022    ALT 26 01/06/2021       CBC RESULTS:  Lab Results   Component Value Date    WBC 7.9 03/25/2022    WBC 7.1 11/13/2019    RBC 4.02 (L) 03/25/2022    RBC 3.90 (L) 11/13/2019    HGB 14.7 03/25/2022    HGB 14.4 11/13/2019    HCT 42.4 03/25/2022    HCT 41.3 11/13/2019     (H) 03/25/2022     (H) 11/13/2019    MCH 36.6 (H) 03/25/2022    MCH 36.9 (H) 11/13/2019    MCHC 34.7 03/25/2022    MCHC 34.9 11/13/2019    RDW 11.3 03/25/2022    RDW 11.4 11/13/2019     (L) 03/25/2022     (L) 11/13/2019       BMP RESULTS:  Lab Results   Component Value Date     05/16/2022     11/13/2019    POTASSIUM 3.8 05/16/2022    POTASSIUM 4.2 01/06/2021    CHLORIDE 108 05/16/2022    CHLORIDE 103 11/13/2019    CO2 26 05/16/2022    CO2 25 11/13/2019    ANIONGAP 5 05/16/2022    ANIONGAP 7 11/13/2019     (H) 05/16/2022     (A) 01/06/2021    BUN 13 05/16/2022    BUN 17 11/13/2019    CR 0.65 (L) 05/16/2022    CR 0.77 01/06/2021    GFRESTIMATED >90 05/16/2022    GFRESTIMATED >60 01/06/2021    GFRESTBLACK >60 01/06/2021    SALOME 9.1 05/16/2022    SALOME 9.2 11/13/2019        A1C RESULTS:  Lab Results   Component Value Date    A1C 5.5 03/25/2022    A1C 5.2 11/12/2018       INR RESULTS:  Lab Results   Component Value Date    INR 3.0 (H) 11/16/2021    INR 3.2 (H) 11/02/2021    INR 2.00 (H) 06/29/2021    INR 2.10 (H) 06/15/2021         ECHOCARDIOGRAM  No results found for this or any previous visit (from the past 8760 hour(s)).      No orders of the defined types were placed in this encounter.    No orders of the defined types were placed in this encounter.    There are no discontinued medications.      Encounter Diagnoses   Name Primary?     ICD (implantable cardioverter-defibrillator) in place      Hyperlipidemia LDL goal <100 Yes     Coronary artery disease involving native coronary artery of native heart without angina  pectoris      Chronic systolic congestive heart failure (H)      Ischemic cardiomyopathy          CC  Sandoval Marroquin MD  [address]  [place] [id number]

## 2022-07-22 LAB
MDC_IDC_LEAD_IMPLANT_DT: NORMAL
MDC_IDC_LEAD_LOCATION: NORMAL
MDC_IDC_LEAD_MFG: NORMAL
MDC_IDC_LEAD_MODEL: NORMAL
MDC_IDC_LEAD_SERIAL: NORMAL
MDC_IDC_LEAD_SPECIAL_FUNCTION: NORMAL
MDC_IDC_MSMT_BATTERY_DTM: NORMAL
MDC_IDC_MSMT_BATTERY_REMAINING_LONGEVITY: 52 MO
MDC_IDC_MSMT_BATTERY_RRT_TRIGGER: 2.73
MDC_IDC_MSMT_BATTERY_STATUS: NORMAL
MDC_IDC_MSMT_BATTERY_VOLTAGE: 2.97 V
MDC_IDC_MSMT_CAP_CHARGE_DTM: NORMAL
MDC_IDC_MSMT_CAP_CHARGE_ENERGY: 18 J
MDC_IDC_MSMT_CAP_CHARGE_TIME: 3.8
MDC_IDC_MSMT_CAP_CHARGE_TYPE: NORMAL
MDC_IDC_MSMT_LEADCHNL_RV_IMPEDANCE_VALUE: 342 OHM
MDC_IDC_MSMT_LEADCHNL_RV_IMPEDANCE_VALUE: 342 OHM
MDC_IDC_MSMT_LEADCHNL_RV_PACING_THRESHOLD_AMPLITUDE: 2.5 V
MDC_IDC_MSMT_LEADCHNL_RV_PACING_THRESHOLD_PULSEWIDTH: 0.4 MS
MDC_IDC_MSMT_LEADCHNL_RV_SENSING_INTR_AMPL: 13.38 MV
MDC_IDC_MSMT_LEADCHNL_RV_SENSING_INTR_AMPL: 16.88 MV
MDC_IDC_PG_IMPLANT_DTM: NORMAL
MDC_IDC_PG_MFG: NORMAL
MDC_IDC_PG_MODEL: NORMAL
MDC_IDC_PG_SERIAL: NORMAL
MDC_IDC_PG_TYPE: NORMAL
MDC_IDC_SESS_CLINIC_NAME: NORMAL
MDC_IDC_SESS_DTM: NORMAL
MDC_IDC_SESS_TYPE: NORMAL
MDC_IDC_SET_BRADY_HYSTRATE: NORMAL
MDC_IDC_SET_BRADY_LOWRATE: 40 {BEATS}/MIN
MDC_IDC_SET_BRADY_MODE: NORMAL
MDC_IDC_SET_LEADCHNL_RV_PACING_AMPLITUDE: 4 V
MDC_IDC_SET_LEADCHNL_RV_PACING_ANODE_ELECTRODE_1: NORMAL
MDC_IDC_SET_LEADCHNL_RV_PACING_ANODE_LOCATION_1: NORMAL
MDC_IDC_SET_LEADCHNL_RV_PACING_CAPTURE_MODE: NORMAL
MDC_IDC_SET_LEADCHNL_RV_PACING_CATHODE_ELECTRODE_1: NORMAL
MDC_IDC_SET_LEADCHNL_RV_PACING_CATHODE_LOCATION_1: NORMAL
MDC_IDC_SET_LEADCHNL_RV_PACING_POLARITY: NORMAL
MDC_IDC_SET_LEADCHNL_RV_PACING_PULSEWIDTH: 1 MS
MDC_IDC_SET_LEADCHNL_RV_SENSING_ANODE_ELECTRODE_1: NORMAL
MDC_IDC_SET_LEADCHNL_RV_SENSING_ANODE_LOCATION_1: NORMAL
MDC_IDC_SET_LEADCHNL_RV_SENSING_CATHODE_ELECTRODE_1: NORMAL
MDC_IDC_SET_LEADCHNL_RV_SENSING_CATHODE_LOCATION_1: NORMAL
MDC_IDC_SET_LEADCHNL_RV_SENSING_POLARITY: NORMAL
MDC_IDC_SET_LEADCHNL_RV_SENSING_SENSITIVITY: 0.3 MV
MDC_IDC_SET_ZONE_DETECTION_BEATS_DENOMINATOR: 40 {BEATS}
MDC_IDC_SET_ZONE_DETECTION_BEATS_NUMERATOR: 30 {BEATS}
MDC_IDC_SET_ZONE_DETECTION_INTERVAL: 320 MS
MDC_IDC_SET_ZONE_DETECTION_INTERVAL: 360 MS
MDC_IDC_SET_ZONE_DETECTION_INTERVAL: 400 MS
MDC_IDC_SET_ZONE_DETECTION_INTERVAL: NORMAL
MDC_IDC_SET_ZONE_TYPE: NORMAL
MDC_IDC_STAT_BRADY_DTM_END: NORMAL
MDC_IDC_STAT_BRADY_DTM_START: NORMAL
MDC_IDC_STAT_BRADY_RV_PERCENT_PACED: 0.03 %
MDC_IDC_STAT_EPISODE_RECENT_COUNT: 0
MDC_IDC_STAT_EPISODE_RECENT_COUNT: 2
MDC_IDC_STAT_EPISODE_RECENT_COUNT_DTM_END: NORMAL
MDC_IDC_STAT_EPISODE_RECENT_COUNT_DTM_START: NORMAL
MDC_IDC_STAT_EPISODE_TOTAL_COUNT: 0
MDC_IDC_STAT_EPISODE_TOTAL_COUNT: 1
MDC_IDC_STAT_EPISODE_TOTAL_COUNT: 14
MDC_IDC_STAT_EPISODE_TOTAL_COUNT_DTM_END: NORMAL
MDC_IDC_STAT_EPISODE_TOTAL_COUNT_DTM_START: NORMAL
MDC_IDC_STAT_EPISODE_TYPE: NORMAL
MDC_IDC_STAT_TACHYTHERAPY_ATP_DELIVERED_RECENT: 0
MDC_IDC_STAT_TACHYTHERAPY_ATP_DELIVERED_TOTAL: 0
MDC_IDC_STAT_TACHYTHERAPY_RECENT_DTM_END: NORMAL
MDC_IDC_STAT_TACHYTHERAPY_RECENT_DTM_START: NORMAL
MDC_IDC_STAT_TACHYTHERAPY_SHOCKS_ABORTED_RECENT: 0
MDC_IDC_STAT_TACHYTHERAPY_SHOCKS_ABORTED_TOTAL: 0
MDC_IDC_STAT_TACHYTHERAPY_SHOCKS_DELIVERED_RECENT: 0
MDC_IDC_STAT_TACHYTHERAPY_SHOCKS_DELIVERED_TOTAL: 1
MDC_IDC_STAT_TACHYTHERAPY_TOTAL_DTM_END: NORMAL
MDC_IDC_STAT_TACHYTHERAPY_TOTAL_DTM_START: NORMAL

## 2022-09-01 DIAGNOSIS — E78.5 HYPERLIPIDEMIA LDL GOAL <100: ICD-10-CM

## 2022-09-01 RX ORDER — ATORVASTATIN CALCIUM 20 MG/1
20 TABLET, FILM COATED ORAL DAILY
Qty: 90 TABLET | Refills: 3 | Status: SHIPPED | OUTPATIENT
Start: 2022-09-01 | End: 2023-08-22

## 2022-10-03 ENCOUNTER — ANCILLARY PROCEDURE (OUTPATIENT)
Dept: CARDIOLOGY | Facility: CLINIC | Age: 72
End: 2022-10-03
Attending: INTERNAL MEDICINE
Payer: MEDICARE

## 2022-10-03 DIAGNOSIS — I25.5 ISCHEMIC CARDIOMYOPATHY: ICD-10-CM

## 2022-10-03 DIAGNOSIS — Z95.810 ICD (IMPLANTABLE CARDIOVERTER-DEFIBRILLATOR) IN PLACE: ICD-10-CM

## 2022-10-03 PROCEDURE — 93296 REM INTERROG EVL PM/IDS: CPT | Performed by: INTERNAL MEDICINE

## 2022-10-03 PROCEDURE — 93295 DEV INTERROG REMOTE 1/2/MLT: CPT | Performed by: INTERNAL MEDICINE

## 2022-10-07 ENCOUNTER — NURSE TRIAGE (OUTPATIENT)
Dept: FAMILY MEDICINE | Facility: CLINIC | Age: 72
End: 2022-10-07

## 2022-10-07 NOTE — TELEPHONE ENCOUNTER
Pt's wife reports pt has a fever after getting shingles vaccine yesterday. She denies anaphylactic symptom. Fever is reported at 100 F during call. Wife reports he is wearing heavy clothes. Triage advised he try taking tylenol and ibuprofen if persistent fever see . Urgent care location and hours reviewed with wife.      Additional Information    Negative: Difficulty breathing or swallowing and starts within 2 hours after injection    Negative: Sounds like a life-threatening emergency to the triager    Negative: [1] Symptoms of COVID-19 (e.g., cough, fever, SOB, or others) AND [2] within 14 days of EXPOSURE (close contact) with diagnosed or suspected COVID-19 patient    Negative: [1] Symptoms of COVID-19 (e.g., cough, fever, SOB, or others) AND [2] within 14 days of being at a crowded indoor or outdoor event (e.g., concert, festival, rally, wedding)    Negative: Typical COVID-19 symptoms (e.g., cough, difficulty breathing, loss of taste or smell, runny nose, sore throat) that are NOT expected from vaccine    Negative: [1] COVID-19 exposure AND [2] no symptoms, or symptoms not typical of COVID-19    Negative: Fever > 104 F (40 C)    Negative: Sounds like a severe, unusual reaction to the triager    Negative: [1] Fever > 101 F (38.3 C) AND [2] over 60 years of age AND [3] started > 48 hours after getting vaccine    Negative: [1] Fever > 100.0 F (37.8 C) AND [2] bedridden (e.g., nursing home patient, CVA, chronic illness, recovering from surgery) AND [3] started > 48 hours after getting vaccine    Negative: [1] Fever > 100.0 F (37.8 C) AND [2] diabetes mellitus or weak immune system (e.g., HIV positive, cancer chemo, splenectomy, organ transplant, chronic steroids) AND [3] started > 48 hours after getting vaccine    Negative: [1] Fever > 100.0 F (37.8 C) AND [2] present > 3 days (72 hours)    Protocols used: CORONAVIRUS (COVID-19) VACCINE QUESTIONS AND [id number]-R-IN 1.18.2022

## 2022-10-12 LAB
MDC_IDC_EPISODE_DTM: NORMAL
MDC_IDC_EPISODE_DURATION: 1 S
MDC_IDC_EPISODE_ID: 47
MDC_IDC_EPISODE_TYPE: NORMAL
MDC_IDC_LEAD_IMPLANT_DT: NORMAL
MDC_IDC_LEAD_LOCATION: NORMAL
MDC_IDC_LEAD_MFG: NORMAL
MDC_IDC_LEAD_MODEL: NORMAL
MDC_IDC_LEAD_SERIAL: NORMAL
MDC_IDC_LEAD_SPECIAL_FUNCTION: NORMAL
MDC_IDC_MSMT_BATTERY_DTM: NORMAL
MDC_IDC_MSMT_BATTERY_REMAINING_LONGEVITY: 43 MO
MDC_IDC_MSMT_BATTERY_RRT_TRIGGER: 2.73
MDC_IDC_MSMT_BATTERY_STATUS: NORMAL
MDC_IDC_MSMT_BATTERY_VOLTAGE: 2.97 V
MDC_IDC_MSMT_CAP_CHARGE_DTM: NORMAL
MDC_IDC_MSMT_CAP_CHARGE_ENERGY: 18 J
MDC_IDC_MSMT_CAP_CHARGE_TIME: 3.88
MDC_IDC_MSMT_CAP_CHARGE_TYPE: NORMAL
MDC_IDC_MSMT_LEADCHNL_RV_IMPEDANCE_VALUE: 304 OHM
MDC_IDC_MSMT_LEADCHNL_RV_IMPEDANCE_VALUE: 323 OHM
MDC_IDC_MSMT_LEADCHNL_RV_PACING_THRESHOLD_AMPLITUDE: 2.5 V
MDC_IDC_MSMT_LEADCHNL_RV_PACING_THRESHOLD_PULSEWIDTH: 0.4 MS
MDC_IDC_MSMT_LEADCHNL_RV_SENSING_INTR_AMPL: 12.12 MV
MDC_IDC_MSMT_LEADCHNL_RV_SENSING_INTR_AMPL: 12.12 MV
MDC_IDC_PG_IMPLANT_DTM: NORMAL
MDC_IDC_PG_MFG: NORMAL
MDC_IDC_PG_MODEL: NORMAL
MDC_IDC_PG_SERIAL: NORMAL
MDC_IDC_PG_TYPE: NORMAL
MDC_IDC_SESS_CLINIC_NAME: NORMAL
MDC_IDC_SESS_DTM: NORMAL
MDC_IDC_SESS_TYPE: NORMAL
MDC_IDC_SET_BRADY_HYSTRATE: NORMAL
MDC_IDC_SET_BRADY_LOWRATE: 40 {BEATS}/MIN
MDC_IDC_SET_BRADY_MODE: NORMAL
MDC_IDC_SET_LEADCHNL_RV_PACING_AMPLITUDE: 5 V
MDC_IDC_SET_LEADCHNL_RV_PACING_ANODE_ELECTRODE_1: NORMAL
MDC_IDC_SET_LEADCHNL_RV_PACING_ANODE_LOCATION_1: NORMAL
MDC_IDC_SET_LEADCHNL_RV_PACING_CAPTURE_MODE: NORMAL
MDC_IDC_SET_LEADCHNL_RV_PACING_CATHODE_ELECTRODE_1: NORMAL
MDC_IDC_SET_LEADCHNL_RV_PACING_CATHODE_LOCATION_1: NORMAL
MDC_IDC_SET_LEADCHNL_RV_PACING_POLARITY: NORMAL
MDC_IDC_SET_LEADCHNL_RV_PACING_PULSEWIDTH: 0.4 MS
MDC_IDC_SET_LEADCHNL_RV_SENSING_ANODE_ELECTRODE_1: NORMAL
MDC_IDC_SET_LEADCHNL_RV_SENSING_ANODE_LOCATION_1: NORMAL
MDC_IDC_SET_LEADCHNL_RV_SENSING_CATHODE_ELECTRODE_1: NORMAL
MDC_IDC_SET_LEADCHNL_RV_SENSING_CATHODE_LOCATION_1: NORMAL
MDC_IDC_SET_LEADCHNL_RV_SENSING_POLARITY: NORMAL
MDC_IDC_SET_LEADCHNL_RV_SENSING_SENSITIVITY: 0.3 MV
MDC_IDC_SET_ZONE_DETECTION_BEATS_DENOMINATOR: 40 {BEATS}
MDC_IDC_SET_ZONE_DETECTION_BEATS_NUMERATOR: 30 {BEATS}
MDC_IDC_SET_ZONE_DETECTION_INTERVAL: 320 MS
MDC_IDC_SET_ZONE_DETECTION_INTERVAL: 360 MS
MDC_IDC_SET_ZONE_DETECTION_INTERVAL: 400 MS
MDC_IDC_SET_ZONE_DETECTION_INTERVAL: NORMAL
MDC_IDC_SET_ZONE_TYPE: NORMAL
MDC_IDC_STAT_BRADY_DTM_END: NORMAL
MDC_IDC_STAT_BRADY_DTM_START: NORMAL
MDC_IDC_STAT_BRADY_RV_PERCENT_PACED: 0.03 %
MDC_IDC_STAT_EPISODE_RECENT_COUNT: 0
MDC_IDC_STAT_EPISODE_RECENT_COUNT: 1
MDC_IDC_STAT_EPISODE_RECENT_COUNT_DTM_END: NORMAL
MDC_IDC_STAT_EPISODE_RECENT_COUNT_DTM_START: NORMAL
MDC_IDC_STAT_EPISODE_TOTAL_COUNT: 0
MDC_IDC_STAT_EPISODE_TOTAL_COUNT: 1
MDC_IDC_STAT_EPISODE_TOTAL_COUNT: 15
MDC_IDC_STAT_EPISODE_TOTAL_COUNT_DTM_END: NORMAL
MDC_IDC_STAT_EPISODE_TOTAL_COUNT_DTM_START: NORMAL
MDC_IDC_STAT_EPISODE_TYPE: NORMAL
MDC_IDC_STAT_TACHYTHERAPY_ATP_DELIVERED_RECENT: 0
MDC_IDC_STAT_TACHYTHERAPY_ATP_DELIVERED_TOTAL: 0
MDC_IDC_STAT_TACHYTHERAPY_RECENT_DTM_END: NORMAL
MDC_IDC_STAT_TACHYTHERAPY_RECENT_DTM_START: NORMAL
MDC_IDC_STAT_TACHYTHERAPY_SHOCKS_ABORTED_RECENT: 0
MDC_IDC_STAT_TACHYTHERAPY_SHOCKS_ABORTED_TOTAL: 0
MDC_IDC_STAT_TACHYTHERAPY_SHOCKS_DELIVERED_RECENT: 0
MDC_IDC_STAT_TACHYTHERAPY_SHOCKS_DELIVERED_TOTAL: 1
MDC_IDC_STAT_TACHYTHERAPY_TOTAL_DTM_END: NORMAL
MDC_IDC_STAT_TACHYTHERAPY_TOTAL_DTM_START: NORMAL

## 2022-10-25 ENCOUNTER — TELEPHONE (OUTPATIENT)
Dept: CARDIOLOGY | Facility: CLINIC | Age: 72
End: 2022-10-25

## 2022-10-25 NOTE — TELEPHONE ENCOUNTER
Pt left VM inquiring if labs to be drawn on 10/31/22 are fasting or not. MEKA Garnica ordered a BMP and BNP. Informed pt he does not need to fast for his lab draw. Sheyla Lock RN on 10/25/2022 at 1:32 PM

## 2022-10-31 ENCOUNTER — LAB (OUTPATIENT)
Dept: LAB | Facility: CLINIC | Age: 72
End: 2022-10-31
Payer: MEDICARE

## 2022-10-31 ENCOUNTER — HOSPITAL ENCOUNTER (OUTPATIENT)
Dept: CARDIOLOGY | Facility: CLINIC | Age: 72
Discharge: HOME | End: 2022-10-31
Attending: PHYSICIAN ASSISTANT | Admitting: PHYSICIAN ASSISTANT
Payer: MEDICARE

## 2022-10-31 DIAGNOSIS — I50.22 CHRONIC SYSTOLIC CONGESTIVE HEART FAILURE (H): ICD-10-CM

## 2022-10-31 LAB
ANION GAP SERPL CALCULATED.3IONS-SCNC: 5 MMOL/L (ref 3–14)
BUN SERPL-MCNC: 14 MG/DL (ref 7–30)
CALCIUM SERPL-MCNC: 10.2 MG/DL (ref 8.5–10.1)
CHLORIDE BLD-SCNC: 104 MMOL/L (ref 94–109)
CO2 SERPL-SCNC: 27 MMOL/L (ref 20–32)
CREAT SERPL-MCNC: 0.77 MG/DL (ref 0.66–1.25)
GFR SERPL CREATININE-BSD FRML MDRD: >90 ML/MIN/1.73M2
GLUCOSE BLD-MCNC: 125 MG/DL (ref 70–99)
LVEF ECHO: NORMAL
NT-PROBNP SERPL-MCNC: 712 PG/ML (ref 0–900)
POTASSIUM BLD-SCNC: 4.5 MMOL/L (ref 3.4–5.3)
SODIUM SERPL-SCNC: 136 MMOL/L (ref 133–144)

## 2022-10-31 PROCEDURE — 93306 TTE W/DOPPLER COMPLETE: CPT | Mod: 26 | Performed by: INTERNAL MEDICINE

## 2022-10-31 PROCEDURE — 255N000002 HC RX 255 OP 636: Performed by: PHYSICIAN ASSISTANT

## 2022-10-31 PROCEDURE — 999N000208 ECHOCARDIOGRAM COMPLETE

## 2022-10-31 PROCEDURE — 83880 ASSAY OF NATRIURETIC PEPTIDE: CPT | Performed by: PHYSICIAN ASSISTANT

## 2022-10-31 PROCEDURE — 36415 COLL VENOUS BLD VENIPUNCTURE: CPT | Performed by: PHYSICIAN ASSISTANT

## 2022-10-31 PROCEDURE — 80048 BASIC METABOLIC PNL TOTAL CA: CPT | Performed by: PHYSICIAN ASSISTANT

## 2022-10-31 RX ADMIN — HUMAN ALBUMIN MICROSPHERES AND PERFLUTREN 9 ML: 10; .22 INJECTION, SOLUTION INTRAVENOUS at 10:54

## 2022-11-01 ENCOUNTER — TELEPHONE (OUTPATIENT)
Dept: CARDIOLOGY | Facility: CLINIC | Age: 72
End: 2022-11-01

## 2022-11-01 ENCOUNTER — OFFICE VISIT (OUTPATIENT)
Dept: CARDIOLOGY | Facility: CLINIC | Age: 72
End: 2022-11-01
Attending: PHYSICIAN ASSISTANT
Payer: MEDICARE

## 2022-11-01 VITALS
DIASTOLIC BLOOD PRESSURE: 70 MMHG | WEIGHT: 185.2 LBS | OXYGEN SATURATION: 98 % | BODY MASS INDEX: 28.07 KG/M2 | HEIGHT: 68 IN | HEART RATE: 75 BPM | SYSTOLIC BLOOD PRESSURE: 153 MMHG

## 2022-11-01 DIAGNOSIS — I50.22 CHRONIC SYSTOLIC CONGESTIVE HEART FAILURE (H): ICD-10-CM

## 2022-11-01 DIAGNOSIS — I25.5 ISCHEMIC CARDIOMYOPATHY: Primary | ICD-10-CM

## 2022-11-01 PROCEDURE — 99215 OFFICE O/P EST HI 40 MIN: CPT | Performed by: INTERNAL MEDICINE

## 2022-11-01 RX ORDER — DAPAGLIFLOZIN 10 MG/1
10 TABLET, FILM COATED ORAL DAILY
Qty: 90 TABLET | Refills: 3 | Status: SHIPPED | OUTPATIENT
Start: 2022-11-01 | End: 2023-10-13

## 2022-11-01 NOTE — TELEPHONE ENCOUNTER
[facility] Call Center    Phone Message    May a detailed message be left on voicemail: yes     Reason for Call: Medication Question or concern regarding medication   Prescription Clarification  Name of Medication: dapagliflozin (FARXIGA) 10 MG TABS tablet  Prescribing Provider: Dr Santiago   Pharmacy:    What on the order needs clarification? The patient said that this medication will cost him over $400.00 and they are wondering if something else can be prescribed.    PT also cant remember what the systolic BP number was that Dr Santiago wanted him to call about     Please call pt back to discuss             Action Taken: Other: Cardiology    Travel Screening: Not Applicable     Thank you!  [facility]

## 2022-11-01 NOTE — TELEPHONE ENCOUNTER
I will message Raine Benítez to get a cost breakdown of the dapagliflozin with his insurance and see what options there are for pt for assistance in getting this medication. Yeimi PRINCE November 1, 2022, 4:24 PM

## 2022-11-01 NOTE — LETTER
11/1/2022    Rory Gambino MD  [address]  [facility] [address]    RE: Tae ISAAC Kindra       Dear Colleague,     I had the pleasure of seeing Tae Arguelles in the [facility].  CARDIOLOGY CLINIC CONSULTATION    REASON FOR CONSULT: Heart failure severe pulmonary hypertension severe mitral regurgitation    PRIMARY CARE PHYSICIAN:  Rory Gambino    Today's clinic visit entailed:  Review of the result(s) of each unique test - Echo labs ECG.  Echo personally reviewed  40 minutes spent on the date of the encounter doing chart review, history and exam, documentation and further activities per the note  Provider  Link to [facility] Help Grid     The level of medical decision making during this visit was of high complexity.      HISTORY OF PRESENT ILLNESS:  Tae is a very delightful 71-year-old gentleman, who transferred care from [facility] where he used to follow with Dr. Brendan Alva, and after his care home, he established care with us at [facility] in [place] in 2021 and saw me in heart failure consultation then.  He has the following set of complex medical issues:  1.  History of a large anterior MI in 1997 with a failed attempted PCI and subsequent emergent bypass surgery, unclear details, but in 1997.  2.  Associated ischemic cardiomyopathy with an EF of 30%.  3.  Secondary ICD implanted during that admission, subsequent gen change in 2015.  4.  Chronic anticoagulation for unclear indications at this time.  The patient says he was on it after his discharge.  No reported history of DVT, PE, AFib or LV thrombus.  This has been stopped due to no clear indication for that.  5.  Hypertension. Hyperlipidemia. Prior history of smoking.  6.  History of ICD shock in 2018 with an echocardiogram at that time showing EF of 32% and a PET stress showing no ischemia, but a relatively large anterior infarct.     When Tae was seen by me first time in November 2021 he  was on beta-blockers and ACE inhibitor's.  After our discussions with him he was put on newer guideline directed therapy.  Metoprolol doses were increased he was put on full dose Entresto and spironolactone.  He has tolerated them well.  However unfortunately over the last few months he has been noticing increased fatigue and tiredness at night.  Recent echocardiography has shown persistent LV dysfunction with wall motion in the LAD territory as previously described but there is tenting of the mitral valve leaflets with some restriction of the anterior mitral valve leaflet leading to severe eccentric central/somewhat anteriorly directed degenerative mitral regurgitation.  He has severe pulmonary hypertension without clinical evidence of volume overload.  He is NYHA class II heart failure symptoms no orthopnea PND.  Does not check his blood pressure at home.  Today in clinic he is elevated.  Denies anginal symptoms.  No syncope presyncope.    PAST MEDICAL HISTORY:  Past Medical History:   Diagnosis Date     BPH (benign prostatic hyperplasia)     Dr. Whitfield, PSA 0.27 12/2012     CAD (coronary artery disease) 1997    MI      CHF (congestive heart failure) (H)      Colon polyp      History of smoking     Quit 1997     HTN (hypertension), benign      Hyperlipidemia LDL goal <100      Ischemic cardiomyopathy     Dr. Alva     Lichen planus      Normal colonoscopy 1-3-08    Repeat 5-10 years     Pacemaker 1997, 2005    ventricular tachycardia     Pleural effusion      Pneumonia 1998     Pneumothorax     plus rib fx secondary to fall (L)     Seborrheic dermatitis      Toe fracture, right        MEDICATIONS:  Current Outpatient Medications   Medication     aspirin (ASA) 81 MG chewable tablet     atorvastatin (LIPITOR) 20 MG tablet     Cholecalciferol (VITAMIN D) 1000 UNITS capsule     dapagliflozin (FARXIGA) 10 MG TABS tablet     fluocinolone (SYNALAR) 0.01 % solution     furosemide (LASIX) 40 MG tablet     metoprolol  succinate ER (TOPROL XL) 200 MG 24 hr tablet     multivitamin w/minerals (THERA-VIT-M) tablet     NIACIN 750 MG OR TBCR     nitroGLYcerin (NITROSTAT) 0.4 MG sublingual tablet     sacubitril-valsartan (ENTRESTO)  MG per tablet     spironolactone (ALDACTONE) 25 MG tablet     triamcinolone (KENALOG) 0.1 % external cream     No current facility-administered medications for this visit.       ALLERGIES:  Allergies   Allergen Reactions     Lactose      Septra [S[facility]a Drugs]      Sulfamethoxazole-Trimethoprim Itching and Rash       SOCIAL HISTORY:  I have reviewed this patient's social history and updated it with pertinent information if needed. Tae Wrightgianamelida  reports that he quit smoking about 25 years ago. He has never used smokeless tobacco. He reports current alcohol use. He reports that he does not use drugs.    FAMILY HISTORY:  I have reviewed this patient's family history and updated it with pertinent information if needed.   Family History   Problem Relation Age of Onset     Diabetes Father      Cancer Father 73        lung and bone ca     Diabetes Sister      Kidney Cancer Sister 77     Breast Cancer Sister      Uterine Cancer Sister      Hypertension Mother      Cerebrovascular Disease Mother          age 75     Thyroid Disease Mother      Kidney Cancer Brother      Diabetes Brother      Heart Defect Brother        REVIEW OF SYSTEMS:  Skin:        Eyes:       ENT:       Respiratory:       Cardiovascular:       Gastroenterology:      Genitourinary:       Musculoskeletal:       Neurologic:       Psychiatric:       Heme/Lymph/Imm:       Endocrine:           PHYSICAL EXAM:      BP: (!) 153/70 Pulse: 75     SpO2: 98 % (room air)      Vital Signs with Ranges  Pulse:  [75] 75  BP: (153)/(70) 153/70  SpO2:  [98 %] 98 %  185 lbs 3.2 oz    Constitutional: awake, alert, no distress  Respiratory: Clear to auscultation  Cardiovascular: Regular heart sounds JVP is normal there is no edema loud systolic murmur  consistent with mitral regurgitation  GI: nondistended  Neuropsychiatric: appropriate affact    DATA:  Labs: Pertinent cardiac labs reviewed    ASSESSMENT:  Severe ischemic cardiomyopathy with LAD territory infarction and bypass surgery decades ago  Secondary prevention ICD in the 1990s with ICD shock in 2018  Severe pulmonary hypertension and severe mitral regurgitation  Uncontrolled systemic hypertension    RECOMMENDATIONS:  1. Patient has NYHA class II heart failure symptoms.  No angina echocardiography personally reviewed.  There is severe mitral regurgitation.  Severe pulmonary hypertension.  Patient is having mild although new symptoms over the last few months.  He feels tired by the end of the day and fatigue.  No orthopnea PND or signs of volume overload.  2. Clinically JVP is normal there is no edema and volume status appears normal.  3. Continue beta-blockers Entresto spironolactone and current dose of Lasix.  Start dapagliflozin 10 mg daily.  4. After a few weeks, recommend right heart catheterization.  I have scheduled it with me.  We will discuss further findings on the day of the cath.  Risk and benefits explained.  He is willing to proceed.  5. I have told him to check regular blood pressures at home.  6. Depending on right heart cath findings, will likely need a DAVIDA and further discussions for MitraClip implantation.    Patient to be scheduled for right heart catheterization on November 30 with myself and labs prior to that.  He will continue his diuretics and current medications that he is taking including the day of the procedure.    KYA Taylor, [facility]  Cardiology - [facility]  November 1, 2022    Thank you for allowing me to participate in the care of your patient.      Sincerely,     Jono Santiago MD     [facility]  cc:   Danika Ye PA-C  [address]  [place] [id number]

## 2022-11-01 NOTE — PATIENT INSTRUCTIONS
Your echo shows severe pulmonary hypertension and mitral regurgitation.     Start Dapagliflozin 10 mg daily for HF  Get Right Heart Catheterization to measure heart pressures with Dr. Santiago on November 30  Get DAVIDA after that to see valve leak

## 2022-11-01 NOTE — PROGRESS NOTES
CARDIOLOGY CLINIC CONSULTATION    REASON FOR CONSULT: Heart failure severe pulmonary hypertension severe mitral regurgitation    PRIMARY CARE PHYSICIAN:  Rory Gambino    Today's clinic visit entailed:  Review of the result(s) of each unique test - Echo labs ECG.  Echo personally reviewed  40 minutes spent on the date of the encounter doing chart review, history and exam, documentation and further activities per the note  Provider  Link to [facility] Help Grid     The level of medical decision making during this visit was of high complexity.      HISTORY OF PRESENT ILLNESS:  Tae is a very delightful 71-year-old gentleman, who transferred care from [facility] where he used to follow with Dr. Brendan Alva, and after his shelter, he established care with us at [facility] in [place] in 2021 and saw me in heart failure consultation then.  He has the following set of complex medical issues:  1.  History of a large anterior MI in 1997 with a failed attempted PCI and subsequent emergent bypass surgery, unclear details, but in 1997.  2.  Associated ischemic cardiomyopathy with an EF of 30%.  3.  Secondary ICD implanted during that admission, subsequent gen change in 2015.  4.  Chronic anticoagulation for unclear indications at this time.  The patient says he was on it after his discharge.  No reported history of DVT, PE, AFib or LV thrombus.  This has been stopped due to no clear indication for that.  5.  Hypertension. Hyperlipidemia. Prior history of smoking.  6.  History of ICD shock in 2018 with an echocardiogram at that time showing EF of 32% and a PET stress showing no ischemia, but a relatively large anterior infarct.     When Tae was seen by me first time in November 2021 he was on beta-blockers and ACE inhibitor's.  After our discussions with him he was put on newer guideline directed therapy.  Metoprolol doses were increased he was put on full dose Entresto and spironolactone.   He has tolerated them well.  However unfortunately over the last few months he has been noticing increased fatigue and tiredness at night.  Recent echocardiography has shown persistent LV dysfunction with wall motion in the LAD territory as previously described but there is tenting of the mitral valve leaflets with some restriction of the anterior mitral valve leaflet leading to severe eccentric central/somewhat anteriorly directed degenerative mitral regurgitation.  He has severe pulmonary hypertension without clinical evidence of volume overload.  He is NYHA class II heart failure symptoms no orthopnea PND.  Does not check his blood pressure at home.  Today in clinic he is elevated.  Denies anginal symptoms.  No syncope presyncope.    PAST MEDICAL HISTORY:  Past Medical History:   Diagnosis Date     BPH (benign prostatic hyperplasia)     Dr. Whitfield, PSA 0.27 12/2012     CAD (coronary artery disease) 1997    MI      CHF (congestive heart failure) (H)      Colon polyp      History of smoking     Quit 1997     HTN (hypertension), benign      Hyperlipidemia LDL goal <100      Ischemic cardiomyopathy     Dr. Alva     Lichen planus      Normal colonoscopy 1-3-08    Repeat 5-10 years     Pacemaker 1997, 2005    ventricular tachycardia     Pleural effusion      Pneumonia 1998     Pneumothorax     plus rib fx secondary to fall (L)     Seborrheic dermatitis      Toe fracture, right        MEDICATIONS:  Current Outpatient Medications   Medication     aspirin (ASA) 81 MG chewable tablet     atorvastatin (LIPITOR) 20 MG tablet     Cholecalciferol (VITAMIN D) 1000 UNITS capsule     dapagliflozin (FARXIGA) 10 MG TABS tablet     fluocinolone (SYNALAR) 0.01 % solution     furosemide (LASIX) 40 MG tablet     metoprolol succinate ER (TOPROL XL) 200 MG 24 hr tablet     multivitamin w/minerals (THERA-VIT-M) tablet     NIACIN 750 MG OR TBCR     nitroGLYcerin (NITROSTAT) 0.4 MG sublingual tablet     sacubitril-valsartan (ENTRESTO)   MG per tablet     spironolactone (ALDACTONE) 25 MG tablet     triamcinolone (KENALOG) 0.1 % external cream     No current facility-administered medications for this visit.       ALLERGIES:  Allergies   Allergen Reactions     Lactose      Septra [Sulfa Drugs]      Sulfamethoxazole-Trimethoprim Itching and Rash       SOCIAL HISTORY:  I have reviewed this patient's social history and updated it with pertinent information if needed. Tae Arguelles  reports that he quit smoking about 25 years ago. He has never used smokeless tobacco. He reports current alcohol use. He reports that he does not use drugs.    FAMILY HISTORY:  I have reviewed this patient's family history and updated it with pertinent information if needed.   Family History   Problem Relation Age of Onset     Diabetes Father      Cancer Father 73        lung and bone ca     Diabetes Sister      Kidney Cancer Sister 77     Breast Cancer Sister      Uterine Cancer Sister      Hypertension Mother      Cerebrovascular Disease Mother          age 75     Thyroid Disease Mother      Kidney Cancer Brother      Diabetes Brother      Heart Defect Brother        REVIEW OF SYSTEMS:  Skin:        Eyes:       ENT:       Respiratory:       Cardiovascular:       Gastroenterology:      Genitourinary:       Musculoskeletal:       Neurologic:       Psychiatric:       Heme/Lymph/Imm:       Endocrine:           PHYSICAL EXAM:      BP: (!) 153/70 Pulse: 75     SpO2: 98 % (room air)      Vital Signs with Ranges  Pulse:  [75] 75  BP: (153)/(70) 153/70  SpO2:  [98 %] 98 %  185 lbs 3.2 oz    Constitutional: awake, alert, no distress  Respiratory: Clear to auscultation  Cardiovascular: Regular heart sounds JVP is normal there is no edema loud systolic murmur consistent with mitral regurgitation  GI: nondistended  Neuropsychiatric: appropriate affact    DATA:  Labs: Pertinent cardiac labs reviewed    ASSESSMENT:  Severe ischemic cardiomyopathy with LAD territory  infarction and bypass surgery decades ago  Secondary prevention ICD in the 1990s with ICD shock in 2018  Severe pulmonary hypertension and severe mitral regurgitation  Uncontrolled systemic hypertension    RECOMMENDATIONS:  1. Patient has NYHA class II heart failure symptoms.  No angina echocardiography personally reviewed.  There is severe mitral regurgitation.  Severe pulmonary hypertension.  Patient is having mild although new symptoms over the last few months.  He feels tired by the end of the day and fatigue.  No orthopnea PND or signs of volume overload.  2. Clinically JVP is normal there is no edema and volume status appears normal.  3. Continue beta-blockers Entresto spironolactone and current dose of Lasix.  Start dapagliflozin 10 mg daily.  4. After a few weeks, recommend right heart catheterization.  I have scheduled it with me.  We will discuss further findings on the day of the cath.  Risk and benefits explained.  He is willing to proceed.  5. I have told him to check regular blood pressures at home.  6. Depending on right heart cath findings, will likely need a DAVIDA and further discussions for MitraClip implantation.    Patient to be scheduled for right heart catheterization on November 30 with myself and labs prior to that.  He will continue his diuretics and current medications that he is taking including the day of the procedure.    KYA Taylor, [facility]  Cardiology - [facility]  November 1, 2022

## 2022-11-02 NOTE — TELEPHONE ENCOUNTER
[facility] Call Center    Phone Message    May a detailed message be left on voicemail: yes     Reason for Call: Other: Nadira calling with one more question for Christelle.  Please call her back at your earliest convenience     Action Taken: Message routed to:  Other: Cardiology    Travel Screening: Not Applicable

## 2022-11-02 NOTE — TELEPHONE ENCOUNTER
I called pt to review cost of dapagliflozin with his current insurance. Pt said he ended up purchasing it today at his pharmacy and did pay 430. Pt said he is in the donut hole right now since he is also on entresto, but hopes the costs of each medication improve in the new year. I told pt that both dapagliflozin and entresto each have a pt assistance program. Pt said he wants to hold off right now on applying.    Pt wanted to know how often he should be checking his BP. I told him to check it daily for the next week or so and to call with an update.    Pt is scheduled for a RHC on 11/30. He wanted to know when he should start holding his dapagliflozin. Pt will need to hold for 3 days prior to the procedure. He also wanted to know if he should take aspirin 325 mg the morning of the procedure and whether he should take his furosemide the morning of the procedure. I told pt that since  is doing the RHC he is ok with pt taking aspirin 81 mg the morning of the procedure, and he wants pt to take his furosemide AM of procedure.     I called pt back after confirming the dapagliflozin hold time for the RHC. Pt will need to hold 3 days prior (starting 11/27). I reviewed pt assistance program options with pt's wife as she also mentioned the cost of the meds. I encouraged her to have pt apply as it takes into account the income for the entire household. She is worried pt won't qualify because he gets a pension. I will mail her the applications to review, and she will let me know if they decide to apply so I can send in 's portion. Yeimi PRINCE November 2, 2022, 4:32 PM

## 2022-11-02 NOTE — TELEPHONE ENCOUNTER
"Patient has Medicare D through [employer].     Farxiga/Jardiance:   --Patient will pay 100% of the first $480 in drugs costs.   --Subsequent fills will be $101/mo.   --lf/when total drug costs exceed $4,430 (not likely to happen in 2022) price will increase to a 25% coinsurance, equivalent to $148/mo.     The Farxiga program is really easy; you can even do the whole katiuska online with the patient on the phone.  More info:     Application for free drug: https://www.[employer]/prescriptionsavings/?screenName=4waystoapply   Phone number: 987.682.8013     *Income 300% FPL (Federal Poverty Limit x 3) (1 person = $40,770, 2 people = $54,930, 3 people = $69,090)   *Entirely online process (Paper application available too) and provider can complete the whole online katiuska \"with patient present.\"   *Alternatively, patient can apply online at home.  Income checked electronically, and income documentation only needed if that fails or is not consented to.  Usually gets immediate denial or approval.  Approval will state \"more info needed from your doctor.\"   *Doctor will be sent a fax to complete their portion.  Or, doctor can skip filling out their portion and e-prescribe directly to [place], [place].     In addition, I would recommend patient consider a Medicare D plan for 2023 that has flat copays as opposed to coinsurance (percentages) in the interest of cost savings.  Plans can be reviewed at medicare.gov October 15-December 7.     Raine Whiting   Pharmacy Technician/Liaison, Discharge Pharmacy   [phone] (voice or text)  [email or web address]   "

## 2022-11-03 NOTE — TELEPHONE ENCOUNTER
Mailed pt assistance applications for dapagliflozin and entresto to pt. Pt and his wife will look over the applications and decide if they want to apply. They will call with an update on what is decided. If they do apply, I will send the provider portion of the applications once the pt portion has been finished. Yeimi PRINCE November 3, 2022, 1:40 PM

## 2022-11-03 NOTE — TELEPHONE ENCOUNTER
I left message for pt or his wife to call back with any other questions they have at this time. Yeimi PRINCE November 3, 2022, 9:46 AM

## 2022-11-21 ENCOUNTER — ANCILLARY PROCEDURE (OUTPATIENT)
Dept: ULTRASOUND IMAGING | Facility: CLINIC | Age: 72
End: 2022-11-21
Attending: UROLOGY
Payer: MEDICARE

## 2022-11-21 DIAGNOSIS — D41.01 NEOPLASM OF UNCERTAIN BEHAVIOR OF RIGHT KIDNEY: ICD-10-CM

## 2022-11-21 PROCEDURE — 76770 US EXAM ABDO BACK WALL COMP: CPT

## 2022-11-28 ENCOUNTER — LAB (OUTPATIENT)
Dept: URGENT CARE | Facility: URGENT CARE | Age: 72
End: 2022-11-28
Payer: MEDICARE

## 2022-11-28 DIAGNOSIS — I50.22 CHRONIC SYSTOLIC CONGESTIVE HEART FAILURE (H): ICD-10-CM

## 2022-11-28 LAB — SARS-COV-2 RNA RESP QL NAA+PROBE: NEGATIVE

## 2022-11-28 PROCEDURE — U0003 INFECTIOUS AGENT DETECTION BY NUCLEIC ACID (DNA OR RNA); SEVERE ACUTE RESPIRATORY SYNDROME CORONAVIRUS 2 (SARS-COV-2) (CORONAVIRUS DISEASE [COVID-19]), AMPLIFIED PROBE TECHNIQUE, MAKING USE OF HIGH THROUGHPUT TECHNOLOGIES AS DESCRIBED BY CMS-2020-01-R: HCPCS

## 2022-11-28 PROCEDURE — U0005 INFEC AGEN DETEC AMPLI PROBE: HCPCS

## 2022-11-29 ENCOUNTER — CARE COORDINATION (OUTPATIENT)
Dept: CARDIOLOGY | Facility: CLINIC | Age: 72
End: 2022-11-29

## 2022-11-29 DIAGNOSIS — I25.5 ISCHEMIC CARDIOMYOPATHY: ICD-10-CM

## 2022-11-29 DIAGNOSIS — I50.22 CHRONIC SYSTOLIC CONGESTIVE HEART FAILURE (H): Primary | ICD-10-CM

## 2022-11-29 DIAGNOSIS — I50.22 CHRONIC SYSTOLIC CONGESTIVE HEART FAILURE (H): ICD-10-CM

## 2022-11-29 RX ORDER — SPIRONOLACTONE 25 MG/1
25 TABLET ORAL DAILY
Qty: 90 TABLET | Refills: 3 | Status: SHIPPED | OUTPATIENT
Start: 2022-11-29 | End: 2023-11-15

## 2022-11-29 RX ORDER — METOPROLOL SUCCINATE 200 MG/1
200 TABLET, EXTENDED RELEASE ORAL DAILY
Qty: 90 TABLET | Refills: 3 | Status: SHIPPED | OUTPATIENT
Start: 2022-11-29 | End: 2023-11-14

## 2022-11-29 RX ORDER — POTASSIUM CHLORIDE 1500 MG/1
20 TABLET, EXTENDED RELEASE ORAL
Status: CANCELLED | OUTPATIENT
Start: 2022-11-29

## 2022-11-29 RX ORDER — SODIUM CHLORIDE 9 MG/ML
INJECTION, SOLUTION INTRAVENOUS CONTINUOUS
Status: CANCELLED | OUTPATIENT
Start: 2022-11-29

## 2022-11-29 RX ORDER — LIDOCAINE 40 MG/G
CREAM TOPICAL
Status: CANCELLED | OUTPATIENT
Start: 2022-11-29

## 2022-11-29 NOTE — PROGRESS NOTES
I called pt and reviewed RHC prep instructions listed below. Pt confirmed that his son will be taking him and picking him up from the procedure. He had a negative COVID test on 11/28/22. Pt said he did hold the farxiga for 3 days prior because that is what was listed on the prep instruction sheet. He will resume taking it after the RHC. Pt said he did get the dapagliflozin and entresto pt assistance forms that I mailed to him. He doesn't think he will make the income cutoff. I encouraged him to think about applying for the [employer] entresto pt assistance program as I have seen that it tends to approve most of our pts that apply. Pt said he wants to wait to see what the cost of both medications is for 2023, and will let me know if he decides to apply for either of the pt assistance programs. Yeimi PRINCE November 29, 2022, 10:40 AM          Coronary angiogram/PCI/Right Heart Cath prep instructions.     Patient is scheduled for a Right Heart Cath at [facility] - [address] [place] [address] [place] [address] - Main Entrance of the Hospital, on 11/30/22.  Check in time is at 0630 AM and procedure to follow.    Patient instructed to remain NPO for solid foods 8 hours prior to arrival and may have clear liquids up to 2 hours prior to arrival.    Patient does not require extra fluids prior to procedure.    Patient is not diabetic.    Patient is not on anticoagulation.    Patient is taking spironolactone  And furosemide, and has been advised to take the morning of the procedure.    Patient is having a Right Heart Cath and should continue furosemide and spironolactone as prescribed.    Patient is having a Right Heart Cath, no additional ASA is needed.        Patient advised to take their other daily medications the morning of the procedure with small sips of water.     Verified patient does not have a contrast allergy.    Verified patient has someone available to drive them home from the hospital and can  stay with them for 24 hours after the procedure.     Patient aware that a negative COVID test result is mandatory prior to procedure and should bring result with them the morning of the procedure if not completed at a [place] location.    Patient will check their temperature the morning of procedure and call [facility] at [phone] if temp is >100.0.    Patient is aware of visitor policy.    Patient expresses understanding of above instructions and denies further questions at this time.        [facility]

## 2022-11-30 ENCOUNTER — HOSPITAL ENCOUNTER (OUTPATIENT)
Facility: CLINIC | Age: 72
Discharge: HOME | End: 2022-11-30
Admitting: INTERNAL MEDICINE
Payer: MEDICARE

## 2022-11-30 ENCOUNTER — TELEPHONE (OUTPATIENT)
Dept: CARDIOLOGY | Facility: CLINIC | Age: 72
End: 2022-11-30

## 2022-11-30 ENCOUNTER — TRANSFERRED RECORDS (OUTPATIENT)
Dept: HEALTH INFORMATION MANAGEMENT | Facility: CLINIC | Age: 72
End: 2022-11-30

## 2022-11-30 VITALS
TEMPERATURE: 97.9 F | DIASTOLIC BLOOD PRESSURE: 62 MMHG | SYSTOLIC BLOOD PRESSURE: 109 MMHG | HEIGHT: 68 IN | BODY MASS INDEX: 27.96 KG/M2 | HEART RATE: 69 BPM | RESPIRATION RATE: 16 BRPM | OXYGEN SATURATION: 98 % | WEIGHT: 184.5 LBS

## 2022-11-30 DIAGNOSIS — I50.22 CHRONIC SYSTOLIC CONGESTIVE HEART FAILURE (H): ICD-10-CM

## 2022-11-30 LAB
ANION GAP SERPL CALCULATED.3IONS-SCNC: 6 MMOL/L (ref 3–14)
APTT PPP: 27 SECONDS (ref 22–38)
BUN SERPL-MCNC: 17 MG/DL (ref 7–30)
CALCIUM SERPL-MCNC: 9.5 MG/DL (ref 8.5–10.1)
CHLORIDE BLD-SCNC: 102 MMOL/L (ref 94–109)
CO2 SERPL-SCNC: 27 MMOL/L (ref 20–32)
CREAT SERPL-MCNC: 0.79 MG/DL (ref 0.66–1.25)
ERYTHROCYTE [DISTWIDTH] IN BLOOD BY AUTOMATED COUNT: 11.5 % (ref 10–15)
GFR SERPL CREATININE-BSD FRML MDRD: >90 ML/MIN/1.73M2
GLUCOSE BLD-MCNC: 123 MG/DL (ref 70–99)
HCO3 BLDV-SCNC: 26 MMOL/L (ref 21–28)
HCT VFR BLD AUTO: 42.2 % (ref 40–53)
HGB BLD-MCNC: 14.9 G/DL (ref 13.3–17.7)
INR PPP: 1.09 (ref 0.85–1.15)
LACTATE BLD-SCNC: 0.6 MMOL/L
MCH RBC QN AUTO: 37.2 PG (ref 26.5–33)
MCHC RBC AUTO-ENTMCNC: 35.3 G/DL (ref 31.5–36.5)
MCV RBC AUTO: 105 FL (ref 78–100)
PCO2 BLDV: 45 MM HG (ref 40–50)
PH BLDV: 7.37 [PH] (ref 7.32–7.43)
PLATELET # BLD AUTO: 132 10E3/UL (ref 150–450)
PO2 BLDV: 38 MM HG (ref 25–47)
POTASSIUM BLD-SCNC: 3.9 MMOL/L (ref 3.4–5.3)
PSA SERPL-MCNC: 0.64 NG/ML (ref 0–6.5)
RBC # BLD AUTO: 4.01 10E6/UL (ref 4.4–5.9)
SAO2 % BLDV: 70 % (ref 94–100)
SODIUM SERPL-SCNC: 135 MMOL/L (ref 133–144)
WBC # BLD AUTO: 8.7 10E3/UL (ref 4–11)

## 2022-11-30 PROCEDURE — 85610 PROTHROMBIN TIME: CPT | Performed by: INTERNAL MEDICINE

## 2022-11-30 PROCEDURE — 999N000054 HC STATISTIC EKG NON-CHARGEABLE

## 2022-11-30 PROCEDURE — 272N000001 HC OR GENERAL SUPPLY STERILE: Performed by: INTERNAL MEDICINE

## 2022-11-30 PROCEDURE — 99152 MOD SED SAME PHYS/QHP 5/>YRS: CPT | Performed by: INTERNAL MEDICINE

## 2022-11-30 PROCEDURE — 93005 ELECTROCARDIOGRAM TRACING: CPT

## 2022-11-30 PROCEDURE — 93451 RIGHT HEART CATH: CPT | Mod: 26 | Performed by: INTERNAL MEDICINE

## 2022-11-30 PROCEDURE — 999N000071 HC STATISTIC HEART CATH LAB OR EP LAB

## 2022-11-30 PROCEDURE — 83605 ASSAY OF LACTIC ACID: CPT

## 2022-11-30 PROCEDURE — G0103 PSA SCREENING: HCPCS

## 2022-11-30 PROCEDURE — C1894 INTRO/SHEATH, NON-LASER: HCPCS | Performed by: INTERNAL MEDICINE

## 2022-11-30 PROCEDURE — 36591 DRAW BLOOD OFF VENOUS DEVICE: CPT

## 2022-11-30 PROCEDURE — 93010 ELECTROCARDIOGRAM REPORT: CPT | Performed by: INTERNAL MEDICINE

## 2022-11-30 PROCEDURE — 258N000003 HC RX IP 258 OP 636: Performed by: INTERNAL MEDICINE

## 2022-11-30 PROCEDURE — 85027 COMPLETE CBC AUTOMATED: CPT | Performed by: INTERNAL MEDICINE

## 2022-11-30 PROCEDURE — 93451 RIGHT HEART CATH: CPT | Performed by: INTERNAL MEDICINE

## 2022-11-30 PROCEDURE — 250N000013 HC RX MED GY IP 250 OP 250 PS 637: Performed by: INTERNAL MEDICINE

## 2022-11-30 PROCEDURE — 85730 THROMBOPLASTIN TIME PARTIAL: CPT | Performed by: INTERNAL MEDICINE

## 2022-11-30 PROCEDURE — 999N000184 HC STATISTIC TELEMETRY

## 2022-11-30 PROCEDURE — 82310 ASSAY OF CALCIUM: CPT | Performed by: INTERNAL MEDICINE

## 2022-11-30 PROCEDURE — 250N000009 HC RX 250: Performed by: INTERNAL MEDICINE

## 2022-11-30 PROCEDURE — 36415 COLL VENOUS BLD VENIPUNCTURE: CPT | Performed by: INTERNAL MEDICINE

## 2022-11-30 RX ORDER — ONDANSETRON 2 MG/ML
4 INJECTION INTRAMUSCULAR; INTRAVENOUS EVERY 6 HOURS PRN
Status: DISCONTINUED | OUTPATIENT
Start: 2022-11-30 | End: 2022-11-30 | Stop reason: HOSPADM

## 2022-11-30 RX ORDER — ASPIRIN 81 MG/1
81 TABLET ORAL DAILY
Status: DISCONTINUED | OUTPATIENT
Start: 2022-12-01 | End: 2022-11-30 | Stop reason: HOSPADM

## 2022-11-30 RX ORDER — SODIUM CHLORIDE 9 MG/ML
INJECTION, SOLUTION INTRAVENOUS CONTINUOUS
Status: DISCONTINUED | OUTPATIENT
Start: 2022-11-30 | End: 2022-11-30 | Stop reason: HOSPADM

## 2022-11-30 RX ORDER — ACETAMINOPHEN 325 MG/1
650 TABLET ORAL EVERY 4 HOURS PRN
Status: DISCONTINUED | OUTPATIENT
Start: 2022-11-30 | End: 2022-11-30 | Stop reason: HOSPADM

## 2022-11-30 RX ORDER — POTASSIUM CHLORIDE 1500 MG/1
20 TABLET, EXTENDED RELEASE ORAL
Status: COMPLETED | OUTPATIENT
Start: 2022-11-30 | End: 2022-11-30

## 2022-11-30 RX ORDER — ONDANSETRON 4 MG/1
4 TABLET, ORALLY DISINTEGRATING ORAL EVERY 6 HOURS PRN
Status: DISCONTINUED | OUTPATIENT
Start: 2022-11-30 | End: 2022-11-30 | Stop reason: HOSPADM

## 2022-11-30 RX ORDER — LIDOCAINE 40 MG/G
CREAM TOPICAL
Status: DISCONTINUED | OUTPATIENT
Start: 2022-11-30 | End: 2022-11-30 | Stop reason: HOSPADM

## 2022-11-30 RX ADMIN — SODIUM CHLORIDE: 9 INJECTION, SOLUTION INTRAVENOUS at 07:00

## 2022-11-30 RX ADMIN — POTASSIUM CHLORIDE 20 MEQ: 1500 TABLET, EXTENDED RELEASE ORAL at 08:10

## 2022-11-30 ASSESSMENT — ACTIVITIES OF DAILY LIVING (ADL)
ADLS_ACUITY_SCORE: 35
ADLS_ACUITY_SCORE: 35

## 2022-11-30 NOTE — TELEPHONE ENCOUNTER
----- Message from Jono Santiago MD sent at 11/30/2022  1:30 PM CST -----  I did his right heart cath.  He is can see you in a couple of weeks.  If still short of breath consider DAVIDA with me for potential evaluation for MitraClip.

## 2022-11-30 NOTE — PROGRESS NOTES
Care Suites Admission Nursing Note    Patient Information  Name: Tae Arguelles  Age: 72 year old  Reason for admission: heart cath  Care Suites arrival time: 0715    Visitor Information  Name: marlena   Informed of visitor restrictions: Yes  1 visitor allowed per patient   Visitor must screen negative for COVID symptoms   Visitor must wear a mask  Waiting rooms closed to visitors    Patient Admission/Assessment   Pre-procedure assessment complete: Yes  If abnormal assessment/labs, provider notified: N/A  NPO: yes  Medications held per instructions/orders: Yes  Consent: obtained  If applicable, pregnancy test status: deferred  Patient oriented to room: Yes  Education/questions answered: Yes  Plan/other: heart cath    Discharge Planning  Discharge name/phone number: marlena [phone]  Overnight post sedation caregiver: marlena  Discharge location: home    Tico Swain RN

## 2022-11-30 NOTE — DISCHARGE INSTRUCTIONS
Right Heart Cath Discharge Instructions - Neck     After you go home:    Have an adult stay with you until tomorrow.  Drink extra fluids for 2 days.  You may resume your normal diet.  No smoking       For 24 hours - due to the sedation you received:  Relax and take it easy.  Do NOT make any important or legal decisions.  Do NOT drive or operate machines at home or at work.  Do NOT drink alcohol.    Care of Neck Puncture Site:    For the first 24 hrs - check the puncture site every 1-2 hours while awake.  It is normal to have soreness at the puncture site and mild tingling in your hand for up to 3 days.  Remove the bandaid after 24 hours. If there is minor oozing, apply another bandaid and remove it after 12 hours.  You may shower tomorrow. Do NOT take a bath, or use a hot tub or pool for at least 3 days. Do NOT scrub the site. Do not use lotion or powder near the puncture site.           Activity - For 2 days:    Avoid heavy lifting or the overuse of your shoulder.      Bleeding:     If you start bleeding from the site in your neck, sit down and press gently on the site for 10 minutes.   Once bleeding stops, sit still for 2 hours.   Call [facility] as soon as you can    Call 911 right away if you have heavy bleeding or bleeding that does not stop.      Medicines:    If you are taking an antiplatelet medication such as Plavix, Brilinta or Effient, do not stop taking it until you talk to your cardiologist.      If you are on Metformin (Glucophage), do not restart it until you have blood tests (within 2 to 3 days after discharge).  After you have your blood drawn, you may restart the Metformin.   Take your medications, including blood thinners, unless your provider tells you not to.    If you take Coumadin (Warfarin), have your INR checked by your provider in  3-5 days. Call your clinic to schedule this.  If you have stopped any medicines, check with your provider about when to restart them.    Follow Up  Appointments:    Follow up with [facility] Nurse Practitioner at [facility] of patient preference in 7-10 days.    Call the clinic if:    You have increased pain or a large or growing hard lump around the site.  The site is red, swollen, hot or tender.  Blood or fluid is draining from the site.  You have chills or a fever greater than 101 F (38 C).  You have hives, a rash or unusual itching.  Any questions or concerns.      [facility] [place]:    [phone] [facility] (7 days a week)

## 2022-11-30 NOTE — PRE-PROCEDURE
GENERAL PRE-PROCEDURE:   Procedure:  RHC  Date/Time:  11/30/2022 8:22 AM    Written consent obtained?: Yes    Risks and benefits: Risks, benefits and alternatives were discussed    Consent given by:  Patient  Patient states understanding of procedure being performed: Yes    Patient's understanding of procedure matches consent: Yes    Procedure consent matches procedure scheduled: Yes    Expected level of sedation:  Moderate  Appropriately NPO:  Yes  Mallampati  :  Grade 2- soft palate, base of uvula, tonsillar pillars, and portion of posterior pharyngeal wall visible  Lungs:  Lungs clear with good breath sounds bilaterally  Heart:  Normal heart sounds and rate  History & Physical reviewed:  History and physical reviewed and no updates needed  Statement of review:  I have reviewed the lab findings, diagnostic data, medications, and the plan for sedation    Risks and benefits of the procedure were discussed with the patient (and/or his family) in detail including but not limited to the risk of neck discomfort, vascular arterial injury and stroke, bleeding, need for blood transfusion, pneumothorax needing urgent interventions, heart block needing temporary or permanent pacemaker, cardiac perforation needing emergent interventions, PA rupture needing emergent surgery, sedation related complications, and death. In certain occassions, there may be a need to change access site from neck to groin or right to left or vice-versa. Patient understands the overall risks of the procedure and wishes to proceed.

## 2022-11-30 NOTE — Clinical Note
Potential access sites were evaluated for patency using ultrasound.   The right jugular vein was selected. Access was obtained under with Sonosite guidance using a micropuncture 21 gauge needle with direct visualization of needle entry.

## 2022-11-30 NOTE — PROGRESS NOTES
Care Suites Discharge Nursing Note    Patient Information  Name: Tae Arguelles  Age: 72 year old    Discharge Education:  Discharge instructions reviewed: Yes  Additional education/resources provided: no  Patient/patient representative verbalizes understanding: Yes  Patient discharging on new medications: No  Medication education completed: Yes    Discharge Plans:   Discharge location: home  Discharge ride contacted: Yes  Approximate discharge time: 1035    Discharge Criteria:  Discharge criteria met and vital signs stable: Yes    Patient Belongs:  Patient belongings returned to patient: Yes    Tico Swain RN

## 2022-12-06 LAB
ATRIAL RATE - MUSE: 67 BPM
DIASTOLIC BLOOD PRESSURE - MUSE: NORMAL MMHG
INTERPRETATION ECG - MUSE: NORMAL
P AXIS - MUSE: 69 DEGREES
PR INTERVAL - MUSE: 194 MS
QRS DURATION - MUSE: 122 MS
QT - MUSE: 408 MS
QTC - MUSE: 431 MS
R AXIS - MUSE: 87 DEGREES
SYSTOLIC BLOOD PRESSURE - MUSE: NORMAL MMHG
T AXIS - MUSE: 132 DEGREES
VENTRICULAR RATE- MUSE: 67 BPM

## 2022-12-13 ENCOUNTER — CARE COORDINATION (OUTPATIENT)
Dept: CARDIOLOGY | Facility: CLINIC | Age: 72
End: 2022-12-13

## 2022-12-13 ENCOUNTER — OFFICE VISIT (OUTPATIENT)
Dept: CARDIOLOGY | Facility: CLINIC | Age: 72
End: 2022-12-13
Payer: MEDICARE

## 2022-12-13 VITALS
OXYGEN SATURATION: 97 % | DIASTOLIC BLOOD PRESSURE: 69 MMHG | BODY MASS INDEX: 27.86 KG/M2 | HEIGHT: 68 IN | HEART RATE: 73 BPM | SYSTOLIC BLOOD PRESSURE: 131 MMHG | WEIGHT: 183.8 LBS

## 2022-12-13 DIAGNOSIS — I50.22 CHRONIC SYSTOLIC CONGESTIVE HEART FAILURE (H): Primary | ICD-10-CM

## 2022-12-13 DIAGNOSIS — I25.5 ISCHEMIC CARDIOMYOPATHY: ICD-10-CM

## 2022-12-13 PROCEDURE — 99214 OFFICE O/P EST MOD 30 MIN: CPT | Performed by: PHYSICIAN ASSISTANT

## 2022-12-13 RX ORDER — FUROSEMIDE 20 MG
20 TABLET ORAL DAILY
Qty: 90 TABLET | Refills: 3 | Status: SHIPPED | OUTPATIENT
Start: 2022-12-13 | End: 2022-12-14 | Stop reason: DRUGHIGH

## 2022-12-13 NOTE — PROGRESS NOTES
Service Date: 2022    PRIMARY CARDIOLOGIST:  Jono Santiago MD    REASON FOR VISIT:  [facility] followup.    HISTORY OF PRESENT ILLNESS:  Mr. Arguelles is a delightful 72-year-old gentleman with a past medical history significant for the followin.  Large anterior MI in  with an occluded LAD and severe circ lesion on angiogram.  They were unable to complete PCI and he underwent emergent 2-vessel CAB completed by Dr. Vera.  2.  Longstanding ischemic cardiomyopathy and heart failure with reduced ejection fraction with EF historically about 30% earlier this year was done in the 25% -30% range and now more 30% -35%.  3.  Wide complex tachycardia post-MI with decreased EF with ICD in place since  with history of appropriate shocks, I believe in , with RV lead with elevated threshold being watched by EP.  4.  History of anticoagulation post-MI with post-MI notes telling him to be on Coumadin for 6 months, but he ended up being on Coumadin into the  range.  This was then discontinued and aspirin alone.  No signs or symptoms of AFib.  5.  Hypertension.  6.  Hyperlipidemia.  7.  Remote history of smoking, quit at the time of his MI.  8.  Moderate severe mitral regurgitation and mild to moderate tricuspid regurg.    He previously followed with Dr. Hurtado and then switched to Dr. Alva at [facility] and now has been following with Dr. Santiago.  We were able to get him on optimal medical therapies and last time he saw Dr. Santiago, he was started on Farxiga.  He underwent a right heart catheterization that actually looked excellent.  This was reviewed today.  It showed an RA of 5, PA 40/16, a wedge of 17 with a cardiac output of 3.9 and an index of 2.0.    Today, he comes in and tells me he is doing well.  His weight at home is stable at 177.  His blood pressure is in the 100-110 range at home.  He has felt well at home.  He can walk 3-1/2 miles an hour on his treadmill at 2% grade.  He is limited  now by knee pain.  He can carry laundry up and down the stairs and has no other heart failure symptoms.    PHYSICAL EXAMINATION:    GENERAL:  Well-developed, well-nourished gentleman in no acute distress.  HEENT:  Normocephalic, atraumatic.  HEART:  Regular with 2/6 systolic murmur heard best at lower left sternal border.  LUNGS:  Clear, without wheezes, rales, or rhonchi.  EXTREMITIES:  Without peripheral edema.  NECK:  The neck veins are completely flat at 40 degrees.  SKIN:  Warm and dry.    Right heart catheterization reviewed as above.    ASSESSMENT AND PLAN:    1.  Ischemic cardiomyopathy since 1997 with heart failure with reduced ejection fraction and an EF 30% -35%, now with class I to class II symptoms on optimal medications including Toprol, spironolactone, Entresto and now Farxiga.  Unfortunately, the Entresto and Farxiga are quite expensive for him and I asked him to reach out to his insurance company to see if he could figure a better price.  He is paying up to $1000 a month for these 2 medications.  At this point, though, he is doing well and will continue that.  2.  Mitral regurgitation, moderate to severe, without any symptoms at all.  We discussed briefly MitraClip as a possibility, but given he is asymptomatic, we will not pursue that.  At this point, he does not need a DAVIDA, but down the road we will repeat a transthoracic, and have him see Dr. Santiago back to discuss in 6 months.  3.  History of nonsustained VT with history of appropriate shocks followed closely by EP as well as RV lead issues.  4.  Dyslipidemia, excellent control.  5.  Hypertension, perfectly controlled at home, but a little bit elevated here.  We will continue to follow.    This patient is doing very well.  We will have him follow up in about 6 months with echocardiogram and labs before that visit.    Danika Ye PA-C        D: 12/13/2022   T: 12/13/2022   MT: morro    Name:     SHANA TREJO  MRN:       [id number]        Account:      [id number]   :      1950           Service Date: 2022       Document: [id number]

## 2022-12-13 NOTE — PATIENT INSTRUCTIONS
Call [facility] nurse for any questions or concerns Mon-Fri 8am-4pm:                                                #[phone]                                       For concerns after hours:                                               #[phone]     1: Medication changes: continue current medications.      2: Plan from today: we'll do an echocardiogram, labs and visit with Dr. Santiago in 6 months.  If you start feeling worse at all, please call.

## 2022-12-13 NOTE — PROGRESS NOTES
[facility]    Incoming message from patient asking for clarification of his lasix dose. He was seen today by Danika Ye. Per her Rx dose changed from 40mg/d to 20mg/d however there are no supporting notes and patient stated they did not discuss this during the visit. Will forward for clarification.  Yajaira Covarrubias RN on 12/13/2022 at 3:16 PM    Per Danika Ye:  When he was roomed, I thought it noted that he was taking 1/2 of a 40 mg tablet daily which I switched to a 20 mg tab to simplify.   If he's been taking 40 mg daily he should stay on that.  If he's been taking 20 he should stay on that.    Patient confirmed he has luis alfredo taking lasix 40mg/d - he will continue at this dose. Corrected Rx sent. Patient verbalized understanding.  Yajaira Covarrubias RN on 12/14/2022 at 1:40 PM

## 2022-12-13 NOTE — PROGRESS NOTES
[id number]      HPI and Plan:   See dictation    Orders this Visit:  Orders Placed This Encounter   Procedures     Basic metabolic panel     N terminal pro BNP outpatient     Echocardiogram Complete     Orders Placed This Encounter   Medications     furosemide (LASIX) 20 MG tablet     Sig: Take 1 tablet (20 mg) by mouth daily     Dispense:  90 tablet     Refill:  3     Dose adjustment only- DO NOT FILL!     Medications Discontinued During This Encounter   Medication Reason     furosemide (LASIX) 40 MG tablet          Encounter Diagnoses   Name Primary?     Chronic systolic congestive heart failure (H) Yes     Ischemic cardiomyopathy        CURRENT MEDICATIONS:  Current Outpatient Medications   Medication Sig Dispense Refill     aspirin (ASA) 81 MG chewable tablet Take 1 tablet (81 mg) by mouth daily       atorvastatin (LIPITOR) 20 MG tablet Take 1 tablet (20 mg) by mouth daily 90 tablet 3     Cholecalciferol (VITAMIN D) 1000 UNITS capsule Take 1 capsule by mouth daily.       dapagliflozin (FARXIGA) 10 MG TABS tablet Take 1 tablet (10 mg) by mouth daily 90 tablet 3     fluocinolone (SYNALAR) 0.01 % solution PRN 60 mL 1     furosemide (LASIX) 20 MG tablet Take 1 tablet (20 mg) by mouth daily 90 tablet 3     metoprolol succinate ER (TOPROL XL) 200 MG 24 hr tablet Take 1 tablet (200 mg) by mouth daily 90 tablet 3     multivitamin w/minerals (THERA-VIT-M) tablet Take 1 tablet by mouth daily.       NIACIN 750 MG OR TBCR 2 tabs qhs       nitroGLYcerin (NITROSTAT) 0.4 MG sublingual tablet Place 1 tablet (0.4 mg) under the tongue every 5 minutes as needed for chest pain 30 tablet 11     sacubitril-valsartan (ENTRESTO)  MG per tablet Take 1 tablet by mouth 2 times daily 180 tablet 3     spironolactone (ALDACTONE) 25 MG tablet Take 1 tablet (25 mg) by mouth daily 90 tablet 3     triamcinolone (KENALOG) 0.1 % external cream Apply topically 2 times daily 45 g 1       ALLERGIES     Allergies   Allergen Reactions     Lactose       Septra [Sulfa Drugs]      Sulfamethoxazole-Trimethoprim Itching and Rash       PAST MEDICAL HISTORY:  Past Medical History:   Diagnosis Date     BPH (benign prostatic hyperplasia)     Dr. Whitfield, PSA 0.27 2012     CAD (coronary artery disease)     MI      CHF (congestive heart failure) (H)      Colon polyp      History of smoking     Quit      HTN (hypertension), benign      Hyperlipidemia LDL goal <100      Ischemic cardiomyopathy     Dr. Alva     Lichen planus      Normal colonoscopy 1-3-08    Repeat 5-10 years     Pacemaker ,     ventricular tachycardia     Pleural effusion      Pneumonia      Pneumothorax     plus rib fx secondary to fall (L)     Seborrheic dermatitis      Toe fracture, right        PAST SURGICAL HISTORY:  Past Surgical History:   Procedure Laterality Date     COLONOSCOPY N/A 2015    Procedure: COLONOSCOPY;  Surgeon: Zac Austin MD;  Location:  GI     CV RIGHT HEART CATH MEASUREMENTS RECORDED N/A 2022    Procedure: Right Heart Cath;  Surgeon: Jono Santiago MD;  Location:  HEART CARDIAC CATH LAB     MOUTH SURGERY      Upper and lower dentures     ZZC CABG, ARTERIAL, TWO         FAMILY HISTORY:  Family History   Problem Relation Age of Onset     Diabetes Father      Cancer Father 73        lung and bone ca     Diabetes Sister      Kidney Cancer Sister 77     Breast Cancer Sister      Uterine Cancer Sister      Hypertension Mother      Cerebrovascular Disease Mother          age 75     Thyroid Disease Mother      Kidney Cancer Brother      Diabetes Brother      Heart Defect Brother        SOCIAL HISTORY:  Social History     Socioeconomic History     Marital status:      Spouse name: None     Number of children: None     Years of education: None     Highest education level: None   Tobacco Use     Smoking status: Former     Types: Cigarettes     Quit date: 10/23/1997     Years since quittin.1     Smokeless tobacco:  "Never   Substance and Sexual Activity     Alcohol use: Yes     Alcohol/week: 0.0 standard drinks     Comment: 7 beers a week     Drug use: No     Sexual activity: Yes     Partners: Female   Social History Narrative    , wife Nadira.       Review of Systems:  Skin:  Negative     Eyes:  Positive for glasses  ENT:  Positive for nasal congestion  Respiratory:  Negative shortness of breath;dyspnea on exertion;sleep apnea;CPAP;cough  Cardiovascular:  Negative;palpitations;chest pain;edema;lightheadedness;dizziness    Gastroenterology: Negative    Genitourinary:  Positive for urgency  Musculoskeletal:  Negative    Neurologic:  Negative    Psychiatric:  Negative excessive stress  Heme/Lymph/Imm:  Negative    Endocrine:  Negative      Physical Exam:  Vitals: /69 (BP Location: Right arm, Cuff Size: Adult Large)   Pulse 73   Ht 1.715 m (5' 7.5\")   Wt 83.4 kg (183 lb 12.8 oz)   SpO2 97%   BMI 28.36 kg/m     Please refer to dictation for physical exam    Recent Lab Results: all reviewed today  CBC RESULTS:  Lab Results   Component Value Date    WBC 8.7 11/30/2022    WBC 7.1 11/13/2019    RBC 4.01 (L) 11/30/2022    RBC 3.90 (L) 11/13/2019    HGB 14.9 11/30/2022    HGB 14.4 11/13/2019    HCT 42.2 11/30/2022    HCT 41.3 11/13/2019     (H) 11/30/2022     (H) 11/13/2019    MCH 37.2 (H) 11/30/2022    MCH 36.9 (H) 11/13/2019    MCHC 35.3 11/30/2022    MCHC 34.9 11/13/2019    RDW 11.5 11/30/2022    RDW 11.4 11/13/2019     (L) 11/30/2022     (L) 11/13/2019       BMP RESULTS:  Lab Results   Component Value Date     11/30/2022     11/13/2019    POTASSIUM 3.9 11/30/2022    POTASSIUM 4.2 01/06/2021    CHLORIDE 102 11/30/2022    CHLORIDE 103 11/13/2019    CO2 27 11/30/2022    CO2 25 11/13/2019    ANIONGAP 6 11/30/2022    ANIONGAP 7 11/13/2019     (H) 11/30/2022     (A) 01/06/2021    BUN 17 11/30/2022    BUN 17 11/13/2019    CR 0.79 11/30/2022    CR 0.77 01/06/2021    " GFRESTIMATED >90 11/30/2022    GFRESTIMATED >60 01/06/2021    GFRESTBLACK >60 01/06/2021    SALOME 9.5 11/30/2022    SALOME 9.2 11/13/2019        INR RESULTS:  Lab Results   Component Value Date    INR 1.09 11/30/2022    INR 3.0 (H) 11/16/2021    INR 3.2 (H) 11/02/2021    INR 2.00 (H) 06/29/2021    INR 2.10 (H) 06/15/2021           CC  No referring provider defined for this encounter.

## 2022-12-13 NOTE — LETTER
12/13/2022    Rory Gambino MD  [address] [place] [address]    RE: Tae Arguelles       Dear Colleague,     I had the pleasure of seeing Tae Arguelles in the [facility].  [phone]      HPI and Plan:   See dictation    Orders this Visit:  Orders Placed This Encounter   Procedures     Basic metabolic panel     N terminal pro BNP outpatient     Echocardiogram Complete     Orders Placed This Encounter   Medications     furosemide (LASIX) 20 MG tablet     Sig: Take 1 tablet (20 mg) by mouth daily     Dispense:  90 tablet     Refill:  3     Dose adjustment only- DO NOT FILL!     Medications Discontinued During This Encounter   Medication Reason     furosemide (LASIX) 40 MG tablet          Encounter Diagnoses   Name Primary?     Chronic systolic congestive heart failure (H) Yes     Ischemic cardiomyopathy        CURRENT MEDICATIONS:  Current Outpatient Medications   Medication Sig Dispense Refill     aspirin (ASA) 81 MG chewable tablet Take 1 tablet (81 mg) by mouth daily       atorvastatin (LIPITOR) 20 MG tablet Take 1 tablet (20 mg) by mouth daily 90 tablet 3     Cholecalciferol (VITAMIN D) 1000 UNITS capsule Take 1 capsule by mouth daily.       dapagliflozin (FARXIGA) 10 MG TABS tablet Take 1 tablet (10 mg) by mouth daily 90 tablet 3     fluocinolone (SYNALAR) 0.01 % solution PRN 60 mL 1     furosemide (LASIX) 20 MG tablet Take 1 tablet (20 mg) by mouth daily 90 tablet 3     metoprolol succinate ER (TOPROL XL) 200 MG 24 hr tablet Take 1 tablet (200 mg) by mouth daily 90 tablet 3     multivitamin w/minerals (THERA-VIT-M) tablet Take 1 tablet by mouth daily.       NIACIN 750 MG OR TBCR 2 tabs qhs       nitroGLYcerin (NITROSTAT) 0.4 MG sublingual tablet Place 1 tablet (0.4 mg) under the tongue every 5 minutes as needed for chest pain 30 tablet 11     sacubitril-valsartan (ENTRESTO)  MG per tablet Take 1 tablet by mouth 2 times daily 180 tablet 3     spironolactone (ALDACTONE) 25  MG tablet Take 1 tablet (25 mg) by mouth daily 90 tablet 3     triamcinolone (KENALOG) 0.1 % external cream Apply topically 2 times daily 45 g 1       ALLERGIES     Allergies   Allergen Reactions     Lactose      Septra [Sulfa Drugs]      Sulfamethoxazole-Trimethoprim Itching and Rash       PAST MEDICAL HISTORY:  Past Medical History:   Diagnosis Date     BPH (benign prostatic hyperplasia)     Dr. Whitfield, PSA 0.27 2012     CAD (coronary artery disease)     MI      CHF (congestive heart failure) (H)      Colon polyp      History of smoking     Quit      HTN (hypertension), benign      Hyperlipidemia LDL goal <100      Ischemic cardiomyopathy     Dr. Alva     Lichen planus      Normal colonoscopy 1-3-08    Repeat 5-10 years     Pacemaker ,     ventricular tachycardia     Pleural effusion      Pneumonia      Pneumothorax     plus rib fx secondary to fall (L)     Seborrheic dermatitis      Toe fracture, right        PAST SURGICAL HISTORY:  Past Surgical History:   Procedure Laterality Date     COLONOSCOPY N/A 2015    Procedure: COLONOSCOPY;  Surgeon: Zac Austin MD;  Location:  GI     CV RIGHT HEART CATH MEASUREMENTS RECORDED N/A 2022    Procedure: Right Heart Cath;  Surgeon: Jono Santiago MD;  Location:  HEART CARDIAC CATH LAB     MOUTH SURGERY      Upper and lower dentures     ZZC CABG, ARTERIAL, TWO         FAMILY HISTORY:  Family History   Problem Relation Age of Onset     Diabetes Father      Cancer Father 73        lung and bone ca     Diabetes Sister      Kidney Cancer Sister 77     Breast Cancer Sister      Uterine Cancer Sister      Hypertension Mother      Cerebrovascular Disease Mother          age 75     Thyroid Disease Mother      Kidney Cancer Brother      Diabetes Brother      Heart Defect Brother        SOCIAL HISTORY:  Social History     Socioeconomic History     Marital status:      Spouse name: None     Number of children: None  "    Years of education: None     Highest education level: None   Tobacco Use     Smoking status: Former     Types: Cigarettes     Quit date: 10/23/1997     Years since quittin.1     Smokeless tobacco: Never   Substance and Sexual Activity     Alcohol use: Yes     Alcohol/week: 0.0 standard drinks     Comment: 7 beers a week     Drug use: No     Sexual activity: Yes     Partners: Female   Social History Narrative    , wife Nadira.       Review of Systems:  Skin:  Negative     Eyes:  Positive for glasses  ENT:  Positive for nasal congestion  Respiratory:  Negative shortness of breath;dyspnea on exertion;sleep apnea;CPAP;cough  Cardiovascular:  Negative;palpitations;chest pain;edema;lightheadedness;dizziness    Gastroenterology: Negative    Genitourinary:  Positive for urgency  Musculoskeletal:  Negative    Neurologic:  Negative    Psychiatric:  Negative excessive stress  Heme/Lymph/Imm:  Negative    Endocrine:  Negative      Physical Exam:  Vitals: /69 (BP Location: Right arm, Cuff Size: Adult Large)   Pulse 73   Ht 1.715 m (5' 7.5\")   Wt 83.4 kg (183 lb 12.8 oz)   SpO2 97%   BMI 28.36 kg/m     Please refer to dictation for physical exam    Recent Lab Results: all reviewed today  CBC RESULTS:  Lab Results   Component Value Date    WBC 8.7 2022    WBC 7.1 2019    RBC 4.01 (L) 2022    RBC 3.90 (L) 2019    HGB 14.9 2022    HGB 14.4 2019    HCT 42.2 2022    HCT 41.3 2019     (H) 2022     (H) 2019    MCH 37.2 (H) 2022    MCH 36.9 (H) 2019    MCHC 35.3 2022    MCHC 34.9 2019    RDW 11.5 2022    RDW 11.4 2019     (L) 2022     (L) 2019       BMP RESULTS:  Lab Results   Component Value Date     2022     2019    POTASSIUM 3.9 2022    POTASSIUM 4.2 2021    CHLORIDE 102 2022    CHLORIDE 103 2019    CO2 27 2022    CO2 25 " 2019    ANIONGAP 6 2022    ANIONGAP 7 2019     (H) 2022     (A) 2021    BUN 17 2022    BUN 17 2019    CR 0.79 2022    CR 0.77 2021    GFRESTIMATED >90 2022    GFRESTIMATED >60 2021    GFRESTBLACK >60 2021    SALOME 9.5 2022    SALOME 9.2 2019        INR RESULTS:  Lab Results   Component Value Date    INR 1.09 2022    INR 3.0 (H) 2021    INR 3.2 (H) 2021    INR 2.00 (H) 2021    INR 2.10 (H) 06/15/2021           CC  No referring provider defined for this encounter.        Service Date: 2022    PRIMARY CARDIOLOGIST:  Jono Santiago MD    REASON FOR VISIT:  C.O.R.E. Clinic followup.    HISTORY OF PRESENT ILLNESS:  Mr. Arguelles is a delightful 72-year-old gentleman with a past medical history significant for the followin.  Large anterior MI in  with an occluded LAD and severe circ lesion on angiogram.  They were unable to complete PCI and he underwent emergent 2-vessel CAB completed by Dr. Vrea.  2.  Longstanding ischemic cardiomyopathy and heart failure with reduced ejection fraction with EF historically about 30% earlier this year was done in the 25% -30% range and now more 30% -35%.  3.  Wide complex tachycardia post-MI with decreased EF with ICD in place since  with history of appropriate shocks, I believe in 2018, with RV lead with elevated threshold being watched by EP.  4.  History of anticoagulation post-MI with post-MI notes telling him to be on Coumadin for 6 months, but he ended up being on Coumadin into the  range.  This was then discontinued and aspirin alone.  No signs or symptoms of AFib.  5.  Hypertension.  6.  Hyperlipidemia.  7.  Remote history of smoking, quit at the time of his MI.  8.  Moderate severe mitral regurgitation and mild to moderate tricuspid regurg.    He previously followed with Dr. Hurtado and then switched to Dr. Alva at [facility] and now has  been following with Dr. Santiago.  We were able to get him on optimal medical therapies and last time he saw Dr. Santiago, he was started on Farxiga.  He underwent a right heart catheterization that actually looked excellent.  This was reviewed today.  It showed an RA of 5, PA 40/16, a wedge of 17 with a cardiac output of 3.9 and an index of 2.0.    Today, he comes in and tells me he is doing well.  His weight at home is stable at 177.  His blood pressure is in the 100-110 range at home.  He has felt well at home.  He can walk 3-1/2 miles an hour on his treadmill at 2% grade.  He is limited now by knee pain.  He can carry laundry up and down the stairs and has no other heart failure symptoms.    PHYSICAL EXAMINATION:    GENERAL:  Well-developed, well-nourished gentleman in no acute distress.  HEENT:  Normocephalic, atraumatic.  HEART:  Regular with 2/6 systolic murmur heard best at lower left sternal border.  LUNGS:  Clear, without wheezes, rales, or rhonchi.  EXTREMITIES:  Without peripheral edema.  NECK:  The neck veins are completely flat at 40 degrees.  SKIN:  Warm and dry.    Right heart catheterization reviewed as above.    ASSESSMENT AND PLAN:    1.  Ischemic cardiomyopathy since 1997 with heart failure with reduced ejection fraction and an EF 30% -35%, now with class I to class II symptoms on optimal medications including Toprol, spironolactone, Entresto and now Farxiga.  Unfortunately, the Entresto and Farxiga are quite expensive for him and I asked him to reach out to his insurance company to see if he could figure a better price.  He is paying up to $1000 a month for these 2 medications.  At this point, though, he is doing well and will continue that.  2.  Mitral regurgitation, moderate to severe, without any symptoms at all.  We discussed briefly MitraClip as a possibility, but given he is asymptomatic, we will not pursue that.  At this point, he does not need a DAVIDA, but down the road we will repeat a  transthoracic, and have him see Dr. Santiago back to discuss in 6 months.  3.  History of nonsustained VT with history of appropriate shocks followed closely by EP as well as RV lead issues.  4.  Dyslipidemia, excellent control.  5.  Hypertension, perfectly controlled at home, but a little bit elevated here.  We will continue to follow.    This patient is doing very well.  We will have him follow up in about 6 months with echocardiogram and labs before that visit.    Danika Ye PA-C        D: 2022   T: 2022   MT: morro    Name:     SHANA TREJO  MRN:      [id number]        Account:      [id number]   :      1950           Service Date: 2022       Document: [id number]    Thank you for allowing me to participate in the care of your patient.      Sincerely,     Danika Ye PA-C     [facility]  cc:   No referring provider defined for this encounter.

## 2022-12-14 RX ORDER — FUROSEMIDE 40 MG
40 TABLET ORAL DAILY
Qty: 90 TABLET | Refills: 3 | Status: SHIPPED | OUTPATIENT
Start: 2022-12-14 | End: 2024-01-12

## 2022-12-16 ENCOUNTER — OFFICE VISIT (OUTPATIENT)
Dept: FAMILY MEDICINE | Facility: CLINIC | Age: 72
End: 2022-12-16
Payer: MEDICARE

## 2022-12-16 VITALS
OXYGEN SATURATION: 96 % | SYSTOLIC BLOOD PRESSURE: 112 MMHG | HEIGHT: 68 IN | DIASTOLIC BLOOD PRESSURE: 68 MMHG | BODY MASS INDEX: 27.58 KG/M2 | TEMPERATURE: 98.4 F | HEART RATE: 76 BPM | WEIGHT: 182 LBS | RESPIRATION RATE: 16 BRPM

## 2022-12-16 DIAGNOSIS — I25.5 ISCHEMIC CARDIOMYOPATHY: ICD-10-CM

## 2022-12-16 DIAGNOSIS — I10 HTN (HYPERTENSION), BENIGN: Primary | ICD-10-CM

## 2022-12-16 DIAGNOSIS — Z23 HIGH PRIORITY FOR 2019-NCOV VACCINE: ICD-10-CM

## 2022-12-16 PROCEDURE — 91312 COVID-19 VACCINE BIVALENT BOOSTER 12+ (PFIZER): CPT | Performed by: INTERNAL MEDICINE

## 2022-12-16 PROCEDURE — 0124A COVID-19 VACCINE BIVALENT BOOSTER 12+ (PFIZER): CPT | Performed by: INTERNAL MEDICINE

## 2022-12-16 PROCEDURE — 99213 OFFICE O/P EST LOW 20 MIN: CPT | Performed by: INTERNAL MEDICINE

## 2022-12-16 ASSESSMENT — PAIN SCALES - GENERAL: PAINLEVEL: NO PAIN (0)

## 2022-12-16 NOTE — PROGRESS NOTES
"  Assessment & Plan     HTN (hypertension), benign  Stable on current regimen.  Most recent labs are reviewed    Ischemic cardiomyopathy  As above         BMI:   Estimated body mass index is 28.08 kg/m  as calculated from the following:    Height as of this encounter: 1.715 m (5' 7.5\").    Weight as of this encounter: 82.6 kg (182 lb).           Return in about 6 months (around 6/16/2023) for Follow up.    Rory Gambino MD  [facility] NATALIE Strong is a 72 year old, presenting for the following health issues:  Follow Up (6 month f/up)      HPI     HTN  The patient has a history of hypertension.  Blood pressure is noted as below.  Compliance with medication is noted.  Side effects of medication are not described.  Symptoms of uncontrolled hypertension including chest pain, dizziness, and vision changes have not been observed.    ICM:  Medications noted.  Last echo shows an EF of 30 to 35%.  Recently had a cardiac procedure.        Review of Systems         Objective    /68 (BP Location: Left arm, Patient Position: Sitting, Cuff Size: Adult Large)   Pulse 76   Temp 98.4  F (36.9  C) (Tympanic)   Resp 16   Ht 1.715 m (5' 7.5\")   Wt 82.6 kg (182 lb)   SpO2 96%   BMI 28.08 kg/m    Body mass index is 28.08 kg/m .  Physical Exam   GENERAL: healthy, alert and no distress  NECK: no adenopathy, no asymmetry, masses, or scars and thyroid normal to palpation  RESP: lungs clear to auscultation - no rales, rhonchi or wheezes  CV: regular rate and rhythm, normal S1 S2, no S3 or S4, no murmur, click or rub, no peripheral edema and peripheral pulses strong  ABDOMEN: soft, nontender, no hepatosplenomegaly, no masses and bowel sounds normal  MS: no gross musculoskeletal defects noted, no edema                    "

## 2023-01-12 ENCOUNTER — ANCILLARY PROCEDURE (OUTPATIENT)
Dept: CARDIOLOGY | Facility: CLINIC | Age: 73
End: 2023-01-12
Attending: INTERNAL MEDICINE
Payer: MEDICARE

## 2023-01-12 DIAGNOSIS — Z95.810 ICD (IMPLANTABLE CARDIOVERTER-DEFIBRILLATOR) IN PLACE: ICD-10-CM

## 2023-01-12 DIAGNOSIS — I25.5 ISCHEMIC CARDIOMYOPATHY: ICD-10-CM

## 2023-01-12 PROCEDURE — 93296 REM INTERROG EVL PM/IDS: CPT | Performed by: INTERNAL MEDICINE

## 2023-01-12 PROCEDURE — 93295 DEV INTERROG REMOTE 1/2/MLT: CPT | Performed by: INTERNAL MEDICINE

## 2023-01-13 LAB
MDC_IDC_LEAD_IMPLANT_DT: NORMAL
MDC_IDC_LEAD_LOCATION: NORMAL
MDC_IDC_LEAD_MFG: NORMAL
MDC_IDC_LEAD_MODEL: NORMAL
MDC_IDC_LEAD_SERIAL: NORMAL
MDC_IDC_LEAD_SPECIAL_FUNCTION: NORMAL
MDC_IDC_MSMT_BATTERY_DTM: NORMAL
MDC_IDC_MSMT_BATTERY_REMAINING_LONGEVITY: 49 MO
MDC_IDC_MSMT_BATTERY_RRT_TRIGGER: 2.73
MDC_IDC_MSMT_BATTERY_STATUS: NORMAL
MDC_IDC_MSMT_BATTERY_VOLTAGE: 2.95 V
MDC_IDC_MSMT_CAP_CHARGE_DTM: NORMAL
MDC_IDC_MSMT_CAP_CHARGE_ENERGY: 18 J
MDC_IDC_MSMT_CAP_CHARGE_TIME: 3.89
MDC_IDC_MSMT_CAP_CHARGE_TYPE: NORMAL
MDC_IDC_MSMT_LEADCHNL_RV_IMPEDANCE_VALUE: 323 OHM
MDC_IDC_MSMT_LEADCHNL_RV_IMPEDANCE_VALUE: 323 OHM
MDC_IDC_MSMT_LEADCHNL_RV_PACING_THRESHOLD_AMPLITUDE: 2.5 V
MDC_IDC_MSMT_LEADCHNL_RV_PACING_THRESHOLD_PULSEWIDTH: 0.4 MS
MDC_IDC_MSMT_LEADCHNL_RV_SENSING_INTR_AMPL: 15.88 MV
MDC_IDC_MSMT_LEADCHNL_RV_SENSING_INTR_AMPL: 15.88 MV
MDC_IDC_PG_IMPLANT_DTM: NORMAL
MDC_IDC_PG_MFG: NORMAL
MDC_IDC_PG_MODEL: NORMAL
MDC_IDC_PG_SERIAL: NORMAL
MDC_IDC_PG_TYPE: NORMAL
MDC_IDC_SESS_CLINIC_NAME: NORMAL
MDC_IDC_SESS_DTM: NORMAL
MDC_IDC_SESS_TYPE: NORMAL
MDC_IDC_SET_BRADY_HYSTRATE: NORMAL
MDC_IDC_SET_BRADY_LOWRATE: 40 {BEATS}/MIN
MDC_IDC_SET_BRADY_MODE: NORMAL
MDC_IDC_SET_LEADCHNL_RV_PACING_AMPLITUDE: 5 V
MDC_IDC_SET_LEADCHNL_RV_PACING_ANODE_ELECTRODE_1: NORMAL
MDC_IDC_SET_LEADCHNL_RV_PACING_ANODE_LOCATION_1: NORMAL
MDC_IDC_SET_LEADCHNL_RV_PACING_CAPTURE_MODE: NORMAL
MDC_IDC_SET_LEADCHNL_RV_PACING_CATHODE_ELECTRODE_1: NORMAL
MDC_IDC_SET_LEADCHNL_RV_PACING_CATHODE_LOCATION_1: NORMAL
MDC_IDC_SET_LEADCHNL_RV_PACING_POLARITY: NORMAL
MDC_IDC_SET_LEADCHNL_RV_PACING_PULSEWIDTH: 1 MS
MDC_IDC_SET_LEADCHNL_RV_SENSING_ANODE_ELECTRODE_1: NORMAL
MDC_IDC_SET_LEADCHNL_RV_SENSING_ANODE_LOCATION_1: NORMAL
MDC_IDC_SET_LEADCHNL_RV_SENSING_CATHODE_ELECTRODE_1: NORMAL
MDC_IDC_SET_LEADCHNL_RV_SENSING_CATHODE_LOCATION_1: NORMAL
MDC_IDC_SET_LEADCHNL_RV_SENSING_POLARITY: NORMAL
MDC_IDC_SET_LEADCHNL_RV_SENSING_SENSITIVITY: 0.3 MV
MDC_IDC_SET_ZONE_DETECTION_BEATS_DENOMINATOR: 40 {BEATS}
MDC_IDC_SET_ZONE_DETECTION_BEATS_NUMERATOR: 30 {BEATS}
MDC_IDC_SET_ZONE_DETECTION_INTERVAL: 320 MS
MDC_IDC_SET_ZONE_DETECTION_INTERVAL: 360 MS
MDC_IDC_SET_ZONE_DETECTION_INTERVAL: 400 MS
MDC_IDC_SET_ZONE_DETECTION_INTERVAL: NORMAL
MDC_IDC_SET_ZONE_TYPE: NORMAL
MDC_IDC_STAT_BRADY_DTM_END: NORMAL
MDC_IDC_STAT_BRADY_DTM_START: NORMAL
MDC_IDC_STAT_BRADY_RV_PERCENT_PACED: 0.02 %
MDC_IDC_STAT_EPISODE_RECENT_COUNT: 0
MDC_IDC_STAT_EPISODE_RECENT_COUNT_DTM_END: NORMAL
MDC_IDC_STAT_EPISODE_RECENT_COUNT_DTM_START: NORMAL
MDC_IDC_STAT_EPISODE_TOTAL_COUNT: 0
MDC_IDC_STAT_EPISODE_TOTAL_COUNT: 1
MDC_IDC_STAT_EPISODE_TOTAL_COUNT: 15
MDC_IDC_STAT_EPISODE_TOTAL_COUNT_DTM_END: NORMAL
MDC_IDC_STAT_EPISODE_TOTAL_COUNT_DTM_START: NORMAL
MDC_IDC_STAT_EPISODE_TYPE: NORMAL
MDC_IDC_STAT_TACHYTHERAPY_ATP_DELIVERED_RECENT: 0
MDC_IDC_STAT_TACHYTHERAPY_ATP_DELIVERED_TOTAL: 0
MDC_IDC_STAT_TACHYTHERAPY_RECENT_DTM_END: NORMAL
MDC_IDC_STAT_TACHYTHERAPY_RECENT_DTM_START: NORMAL
MDC_IDC_STAT_TACHYTHERAPY_SHOCKS_ABORTED_RECENT: 0
MDC_IDC_STAT_TACHYTHERAPY_SHOCKS_ABORTED_TOTAL: 0
MDC_IDC_STAT_TACHYTHERAPY_SHOCKS_DELIVERED_RECENT: 0
MDC_IDC_STAT_TACHYTHERAPY_SHOCKS_DELIVERED_TOTAL: 1
MDC_IDC_STAT_TACHYTHERAPY_TOTAL_DTM_END: NORMAL
MDC_IDC_STAT_TACHYTHERAPY_TOTAL_DTM_START: NORMAL

## 2023-03-15 DIAGNOSIS — I50.22 CHRONIC SYSTOLIC CONGESTIVE HEART FAILURE (H): ICD-10-CM

## 2023-03-15 RX ORDER — SACUBITRIL AND VALSARTAN 97; 103 MG/1; MG/1
1 TABLET, FILM COATED ORAL 2 TIMES DAILY
Qty: 180 TABLET | Refills: 0 | Status: SHIPPED | OUTPATIENT
Start: 2023-03-15 | End: 2023-06-16

## 2023-04-17 ENCOUNTER — ANCILLARY PROCEDURE (OUTPATIENT)
Dept: CARDIOLOGY | Facility: CLINIC | Age: 73
End: 2023-04-17
Attending: INTERNAL MEDICINE
Payer: MEDICARE

## 2023-04-17 DIAGNOSIS — I25.5 ISCHEMIC CARDIOMYOPATHY: ICD-10-CM

## 2023-04-17 DIAGNOSIS — Z95.810 ICD (IMPLANTABLE CARDIOVERTER-DEFIBRILLATOR) IN PLACE: ICD-10-CM

## 2023-04-17 PROCEDURE — 93295 DEV INTERROG REMOTE 1/2/MLT: CPT | Performed by: INTERNAL MEDICINE

## 2023-04-17 PROCEDURE — 93296 REM INTERROG EVL PM/IDS: CPT | Performed by: INTERNAL MEDICINE

## 2023-04-26 LAB
MDC_IDC_EPISODE_DTM: NORMAL
MDC_IDC_EPISODE_DTM: NORMAL
MDC_IDC_EPISODE_DURATION: 48 S
MDC_IDC_EPISODE_DURATION: 51 S
MDC_IDC_EPISODE_ID: 48
MDC_IDC_EPISODE_ID: 49
MDC_IDC_EPISODE_TYPE: NORMAL
MDC_IDC_EPISODE_TYPE: NORMAL
MDC_IDC_LEAD_IMPLANT_DT: NORMAL
MDC_IDC_LEAD_LOCATION: NORMAL
MDC_IDC_LEAD_MFG: NORMAL
MDC_IDC_LEAD_MODEL: NORMAL
MDC_IDC_LEAD_SERIAL: NORMAL
MDC_IDC_LEAD_SPECIAL_FUNCTION: NORMAL
MDC_IDC_MSMT_BATTERY_DTM: NORMAL
MDC_IDC_MSMT_BATTERY_REMAINING_LONGEVITY: 46 MO
MDC_IDC_MSMT_BATTERY_RRT_TRIGGER: 2.73
MDC_IDC_MSMT_BATTERY_STATUS: NORMAL
MDC_IDC_MSMT_BATTERY_VOLTAGE: 2.95 V
MDC_IDC_MSMT_CAP_CHARGE_DTM: NORMAL
MDC_IDC_MSMT_CAP_CHARGE_ENERGY: 18 J
MDC_IDC_MSMT_CAP_CHARGE_TIME: 3.91
MDC_IDC_MSMT_CAP_CHARGE_TYPE: NORMAL
MDC_IDC_MSMT_LEADCHNL_RV_IMPEDANCE_VALUE: 342 OHM
MDC_IDC_MSMT_LEADCHNL_RV_IMPEDANCE_VALUE: 342 OHM
MDC_IDC_MSMT_LEADCHNL_RV_PACING_THRESHOLD_AMPLITUDE: 2.5 V
MDC_IDC_MSMT_LEADCHNL_RV_PACING_THRESHOLD_PULSEWIDTH: 0.4 MS
MDC_IDC_MSMT_LEADCHNL_RV_SENSING_INTR_AMPL: 13.75 MV
MDC_IDC_MSMT_LEADCHNL_RV_SENSING_INTR_AMPL: 13.75 MV
MDC_IDC_PG_IMPLANT_DTM: NORMAL
MDC_IDC_PG_MFG: NORMAL
MDC_IDC_PG_MODEL: NORMAL
MDC_IDC_PG_SERIAL: NORMAL
MDC_IDC_PG_TYPE: NORMAL
MDC_IDC_SESS_CLINIC_NAME: NORMAL
MDC_IDC_SESS_DTM: NORMAL
MDC_IDC_SESS_TYPE: NORMAL
MDC_IDC_SET_BRADY_HYSTRATE: NORMAL
MDC_IDC_SET_BRADY_LOWRATE: 40 {BEATS}/MIN
MDC_IDC_SET_BRADY_MODE: NORMAL
MDC_IDC_SET_LEADCHNL_RV_PACING_AMPLITUDE: 5 V
MDC_IDC_SET_LEADCHNL_RV_PACING_ANODE_ELECTRODE_1: NORMAL
MDC_IDC_SET_LEADCHNL_RV_PACING_ANODE_LOCATION_1: NORMAL
MDC_IDC_SET_LEADCHNL_RV_PACING_CAPTURE_MODE: NORMAL
MDC_IDC_SET_LEADCHNL_RV_PACING_CATHODE_ELECTRODE_1: NORMAL
MDC_IDC_SET_LEADCHNL_RV_PACING_CATHODE_LOCATION_1: NORMAL
MDC_IDC_SET_LEADCHNL_RV_PACING_POLARITY: NORMAL
MDC_IDC_SET_LEADCHNL_RV_PACING_PULSEWIDTH: 1 MS
MDC_IDC_SET_LEADCHNL_RV_SENSING_ANODE_ELECTRODE_1: NORMAL
MDC_IDC_SET_LEADCHNL_RV_SENSING_ANODE_LOCATION_1: NORMAL
MDC_IDC_SET_LEADCHNL_RV_SENSING_CATHODE_ELECTRODE_1: NORMAL
MDC_IDC_SET_LEADCHNL_RV_SENSING_CATHODE_LOCATION_1: NORMAL
MDC_IDC_SET_LEADCHNL_RV_SENSING_POLARITY: NORMAL
MDC_IDC_SET_LEADCHNL_RV_SENSING_SENSITIVITY: 0.3 MV
MDC_IDC_SET_ZONE_DETECTION_BEATS_DENOMINATOR: 40 {BEATS}
MDC_IDC_SET_ZONE_DETECTION_BEATS_NUMERATOR: 30 {BEATS}
MDC_IDC_SET_ZONE_DETECTION_INTERVAL: 320 MS
MDC_IDC_SET_ZONE_DETECTION_INTERVAL: 360 MS
MDC_IDC_SET_ZONE_DETECTION_INTERVAL: 400 MS
MDC_IDC_SET_ZONE_DETECTION_INTERVAL: NORMAL
MDC_IDC_SET_ZONE_TYPE: NORMAL
MDC_IDC_STAT_BRADY_DTM_END: NORMAL
MDC_IDC_STAT_BRADY_DTM_START: NORMAL
MDC_IDC_STAT_BRADY_RV_PERCENT_PACED: 0.02 %
MDC_IDC_STAT_EPISODE_RECENT_COUNT: 0
MDC_IDC_STAT_EPISODE_RECENT_COUNT_DTM_END: NORMAL
MDC_IDC_STAT_EPISODE_RECENT_COUNT_DTM_START: NORMAL
MDC_IDC_STAT_EPISODE_TOTAL_COUNT: 0
MDC_IDC_STAT_EPISODE_TOTAL_COUNT: 1
MDC_IDC_STAT_EPISODE_TOTAL_COUNT: 15
MDC_IDC_STAT_EPISODE_TOTAL_COUNT_DTM_END: NORMAL
MDC_IDC_STAT_EPISODE_TOTAL_COUNT_DTM_START: NORMAL
MDC_IDC_STAT_EPISODE_TYPE: NORMAL
MDC_IDC_STAT_TACHYTHERAPY_ATP_DELIVERED_RECENT: 0
MDC_IDC_STAT_TACHYTHERAPY_ATP_DELIVERED_TOTAL: 0
MDC_IDC_STAT_TACHYTHERAPY_RECENT_DTM_END: NORMAL
MDC_IDC_STAT_TACHYTHERAPY_RECENT_DTM_START: NORMAL
MDC_IDC_STAT_TACHYTHERAPY_SHOCKS_ABORTED_RECENT: 0
MDC_IDC_STAT_TACHYTHERAPY_SHOCKS_ABORTED_TOTAL: 0
MDC_IDC_STAT_TACHYTHERAPY_SHOCKS_DELIVERED_RECENT: 0
MDC_IDC_STAT_TACHYTHERAPY_SHOCKS_DELIVERED_TOTAL: 1
MDC_IDC_STAT_TACHYTHERAPY_TOTAL_DTM_END: NORMAL
MDC_IDC_STAT_TACHYTHERAPY_TOTAL_DTM_START: NORMAL

## 2023-05-17 ENCOUNTER — DOCUMENTATION ONLY (OUTPATIENT)
Dept: LAB | Facility: CLINIC | Age: 73
End: 2023-05-17

## 2023-05-17 DIAGNOSIS — I50.22 CHRONIC SYSTOLIC CONGESTIVE HEART FAILURE (H): Primary | ICD-10-CM

## 2023-05-17 DIAGNOSIS — I25.10 CAD (CORONARY ARTERY DISEASE): ICD-10-CM

## 2023-05-17 DIAGNOSIS — E78.5 HYPERLIPIDEMIA LDL GOAL <100: ICD-10-CM

## 2023-06-01 ENCOUNTER — HOSPITAL ENCOUNTER (OUTPATIENT)
Dept: CARDIOLOGY | Facility: CLINIC | Age: 73
Discharge: HOME | End: 2023-06-01
Attending: PHYSICIAN ASSISTANT | Admitting: PHYSICIAN ASSISTANT
Payer: MEDICARE

## 2023-06-01 DIAGNOSIS — I50.22 CHRONIC SYSTOLIC CONGESTIVE HEART FAILURE (H): Primary | ICD-10-CM

## 2023-06-01 LAB — LVEF ECHO: NORMAL

## 2023-06-01 PROCEDURE — 93306 TTE W/DOPPLER COMPLETE: CPT | Mod: 26 | Performed by: INTERNAL MEDICINE

## 2023-06-01 PROCEDURE — 255N000002 HC RX 255 OP 636: Performed by: PHYSICIAN ASSISTANT

## 2023-06-01 PROCEDURE — 999N000208 ECHOCARDIOGRAM COMPLETE

## 2023-06-01 RX ADMIN — HUMAN ALBUMIN MICROSPHERES AND PERFLUTREN 3 ML: 10; .22 INJECTION, SOLUTION INTRAVENOUS at 10:39

## 2023-06-08 ENCOUNTER — LAB (OUTPATIENT)
Dept: LAB | Facility: CLINIC | Age: 73
End: 2023-06-08
Payer: MEDICARE

## 2023-06-08 DIAGNOSIS — I25.5 ISCHEMIC CARDIOMYOPATHY: Primary | ICD-10-CM

## 2023-06-08 DIAGNOSIS — I25.10 CAD (CORONARY ARTERY DISEASE): ICD-10-CM

## 2023-06-08 DIAGNOSIS — I50.22 CHRONIC SYSTOLIC CONGESTIVE HEART FAILURE (H): ICD-10-CM

## 2023-06-08 DIAGNOSIS — E78.5 HYPERLIPIDEMIA LDL GOAL <100: ICD-10-CM

## 2023-06-08 DIAGNOSIS — Z00.00 ROUTINE GENERAL MEDICAL EXAMINATION AT A HEALTH CARE FACILITY: ICD-10-CM

## 2023-06-08 LAB
ANION GAP SERPL CALCULATED.3IONS-SCNC: 12 MMOL/L (ref 7–15)
BUN SERPL-MCNC: 22.1 MG/DL (ref 8–23)
CALCIUM SERPL-MCNC: 9.9 MG/DL (ref 8.8–10.2)
CHLORIDE SERPL-SCNC: 99 MMOL/L (ref 98–107)
CREAT SERPL-MCNC: 0.95 MG/DL (ref 0.67–1.17)
DEPRECATED HCO3 PLAS-SCNC: 26 MMOL/L (ref 22–29)
GFR SERPL CREATININE-BSD FRML MDRD: 84 ML/MIN/1.73M2
GLUCOSE SERPL-MCNC: 155 MG/DL (ref 70–99)
NT-PROBNP SERPL-MCNC: 544 PG/ML (ref 0–900)
POTASSIUM SERPL-SCNC: 4.6 MMOL/L (ref 3.4–5.3)
SODIUM SERPL-SCNC: 137 MMOL/L (ref 136–145)

## 2023-06-08 PROCEDURE — 80048 BASIC METABOLIC PNL TOTAL CA: CPT

## 2023-06-08 PROCEDURE — 36415 COLL VENOUS BLD VENIPUNCTURE: CPT

## 2023-06-08 PROCEDURE — 83880 ASSAY OF NATRIURETIC PEPTIDE: CPT

## 2023-06-12 ENCOUNTER — LAB (OUTPATIENT)
Dept: LAB | Facility: CLINIC | Age: 73
End: 2023-06-12
Payer: MEDICARE

## 2023-06-12 DIAGNOSIS — E78.5 HYPERLIPIDEMIA LDL GOAL <100: ICD-10-CM

## 2023-06-12 DIAGNOSIS — I25.10 CAD (CORONARY ARTERY DISEASE): ICD-10-CM

## 2023-06-12 DIAGNOSIS — I50.22 CHRONIC SYSTOLIC CONGESTIVE HEART FAILURE (H): ICD-10-CM

## 2023-06-12 LAB
ALT SERPL W P-5'-P-CCNC: 19 U/L (ref 10–50)
CHOLEST SERPL-MCNC: 149 MG/DL
HDLC SERPL-MCNC: 53 MG/DL
LDLC SERPL CALC-MCNC: 69 MG/DL
NONHDLC SERPL-MCNC: 96 MG/DL
TRIGL SERPL-MCNC: 135 MG/DL
TSH SERPL DL<=0.005 MIU/L-ACNC: 1.98 UIU/ML (ref 0.3–4.2)

## 2023-06-12 PROCEDURE — 84460 ALANINE AMINO (ALT) (SGPT): CPT

## 2023-06-12 PROCEDURE — 84443 ASSAY THYROID STIM HORMONE: CPT

## 2023-06-12 PROCEDURE — 36415 COLL VENOUS BLD VENIPUNCTURE: CPT

## 2023-06-12 PROCEDURE — 80061 LIPID PANEL: CPT

## 2023-06-13 ENCOUNTER — ANCILLARY PROCEDURE (OUTPATIENT)
Dept: CARDIOLOGY | Facility: CLINIC | Age: 73
End: 2023-06-13
Attending: INTERNAL MEDICINE
Payer: MEDICARE

## 2023-06-13 ENCOUNTER — OFFICE VISIT (OUTPATIENT)
Dept: CARDIOLOGY | Facility: CLINIC | Age: 73
End: 2023-06-13
Payer: MEDICARE

## 2023-06-13 VITALS
DIASTOLIC BLOOD PRESSURE: 59 MMHG | SYSTOLIC BLOOD PRESSURE: 115 MMHG | BODY MASS INDEX: 28.61 KG/M2 | HEIGHT: 67 IN | HEART RATE: 76 BPM | WEIGHT: 182.3 LBS

## 2023-06-13 DIAGNOSIS — I50.22 CHRONIC SYSTOLIC CONGESTIVE HEART FAILURE (H): Primary | ICD-10-CM

## 2023-06-13 DIAGNOSIS — Z95.810 ICD (IMPLANTABLE CARDIOVERTER-DEFIBRILLATOR) IN PLACE: ICD-10-CM

## 2023-06-13 DIAGNOSIS — I25.5 ISCHEMIC CARDIOMYOPATHY: ICD-10-CM

## 2023-06-13 PROCEDURE — 99214 OFFICE O/P EST MOD 30 MIN: CPT | Performed by: INTERNAL MEDICINE

## 2023-06-13 PROCEDURE — 93282 PRGRMG EVAL IMPLANTABLE DFB: CPT | Performed by: INTERNAL MEDICINE

## 2023-06-13 NOTE — LETTER
"6/13/2023    Rory Gambino MD  [address] [place] [address]    RE: Tae Barrioskatelyn       Dear Colleague,     I had the pleasure of seeing Tae Barrioskatelyn in the [facility].  Electrophysiology/ Clinic Note         H&P and Plan:     REASON FOR VISIT: Electrophysiology evaluation.      HISTORY OF PRESENT ILLNESS: Patient is a pleasant 72-year-old gentleman with a history of hypertension, hyperlipidemia and heart failure secondary to ischemic cardiomyopathy (CABG and ICD implantation in 1997; generator change 2015), who is here for routine follow-up.      Today, he informs he is doing well.  He has no complaints during this visit.  He denies symptoms such as chest pain, shortness of breath, headedness, near-syncope or syncope.     Scheduled to have a device to irrigation after this visit. Device was interrogated done in April showed stable lead parameters and no ventricular arrhythmia was seen.     PREVIOUS STUDIES (personally reviewed):  -Echo (2018): EF estimated 32%.  Moderate MR  -Stress PET (2018): Large transmural infarct in the basal, mid and apical anteroseptal wall.   -Echo (11/10/2021): EF of 25-30%. Moderate to severe MR and mild to moderate TR.  -Echo (6/20/2023): EF of 30-35%.  Moderate to severe MR mild TR.     ASSESSMENT AND PLAN:   1. Device care.    Lead parameters are stable.  We will continue device checks every 3 months.    2. Heart failure secondary ischemic cardiomyopathy.  Euvolemic on physical exam. We will continue therapy with Entresto, lasix,  metoprolol and Coumadin.  He will continue follow-up with heart failure team.      Sandoval Marroquin MD    Physical Exam:  Vitals: /59   Pulse 76   Ht 1.702 m (5' 7\")   Wt 82.7 kg (182 lb 4.8 oz)   BMI 28.55 kg/m      Constitutional:  AAO x3.  Pt is in NAD.  HEAD: normocephalic.  SKIN: Skin normal color, texture and turgor with no lesions or eruptions.  Eyes: PERRL, EOMI.  ENT:  Supple, normal JVP. No " lymphadenopathy or thyroid enlargement.  Chest:  CTAB.  Cardiac:  RRR, normal  S1 and S2.  No murmurs rubs or gallop.    Abdomen:  Normal BS.  Soft, non-tender and non-distended.  No rebound or guarding.    Extremities:  Pedious pulses palpable B/L.  No LE edema noticed.   Neurological: Strength and sensation grossly symmetric and intact throughout.       CURRENT MEDICATIONS:  Current Outpatient Medications   Medication Sig Dispense Refill    aspirin (ASA) 81 MG chewable tablet Take 1 tablet (81 mg) by mouth daily      atorvastatin (LIPITOR) 20 MG tablet Take 1 tablet (20 mg) by mouth daily 90 tablet 3    Cholecalciferol (VITAMIN D) 1000 UNITS capsule Take 1 capsule by mouth daily.      dapagliflozin (FARXIGA) 10 MG TABS tablet Take 1 tablet (10 mg) by mouth daily 90 tablet 3    fluocinolone (SYNALAR) 0.01 % solution PRN 60 mL 1    furosemide (LASIX) 40 MG tablet Take 1 tablet (40 mg) by mouth daily 90 tablet 3    metoprolol succinate ER (TOPROL XL) 200 MG 24 hr tablet Take 1 tablet (200 mg) by mouth daily 90 tablet 3    multivitamin w/minerals (THERA-VIT-M) tablet Take 1 tablet by mouth daily.      NIACIN 750 MG OR TBCR 2 tabs qhs      nitroGLYcerin (NITROSTAT) 0.4 MG sublingual tablet Place 1 tablet (0.4 mg) under the tongue every 5 minutes as needed for chest pain 30 tablet 11    sacubitril-valsartan (ENTRESTO)  MG per tablet Take 1 tablet by mouth 2 times daily 180 tablet 0    spironolactone (ALDACTONE) 25 MG tablet Take 1 tablet (25 mg) by mouth daily 90 tablet 3    triamcinolone (KENALOG) 0.1 % external cream Apply topically 2 times daily 45 g 1       ALLERGIES     Allergies   Allergen Reactions    Lactose     Septra [Sulfa Antibiotics]     Sulfamethoxazole-Trimethoprim Itching and Rash       PAST MEDICAL HISTORY:  Past Medical History:   Diagnosis Date    BPH (benign prostatic hyperplasia)     Dr. Whitfield, PSA 0.27 12/2012    CAD (coronary artery disease) 1997    MI     CHF (congestive heart failure)  (H)     Colon polyp     History of smoking     Quit     HTN (hypertension), benign     Hyperlipidemia LDL goal <100     Ischemic cardiomyopathy     Dr. Alva    Lichen planus     Normal colonoscopy 1-3-08    Repeat 5-10 years    Pacemaker ,     ventricular tachycardia    Pleural effusion     Pneumonia     Pneumothorax     plus rib fx secondary to fall (L)    Seborrheic dermatitis     Toe fracture, right        PAST SURGICAL HISTORY:  Past Surgical History:   Procedure Laterality Date    COLONOSCOPY N/A 2015    Procedure: COLONOSCOPY;  Surgeon: Zac Austin MD;  Location:  GI    CV RIGHT HEART CATH MEASUREMENTS RECORDED N/A 2022    Procedure: Right Heart Cath;  Surgeon: Jono Santiago MD;  Location:  HEART CARDIAC CATH LAB    MOUTH SURGERY      Upper and lower dentures    ZZC CABG, ARTERIAL, TWO         FAMILY HISTORY:  The patient's family history was reviewed and is non-contributory for heart disease.    SOCIAL HISTORY:  Social History     Socioeconomic History    Marital status:      Spouse name: None    Number of children: None    Years of education: None    Highest education level: None   Tobacco Use    Smoking status: Former     Types: Cigarettes     Quit date: 10/23/1997     Years since quittin.6    Smokeless tobacco: Never   Substance and Sexual Activity    Alcohol use: Yes     Alcohol/week: 0.0 standard drinks of alcohol     Comment: 7 beers a week    Drug use: No    Sexual activity: Yes     Partners: Female   Social History Narrative    , wife Nadira.       Review of Systems:  Skin:        Eyes:       ENT:       Respiratory:       Cardiovascular:       Gastroenterology:      Genitourinary:       Musculoskeletal:       Neurologic:       Psychiatric:       Heme/Lymph/Imm:       Endocrine:           Recent Lab Results:  LIPID RESULTS:  Lab Results   Component Value Date    CHOL 149 2023    CHOL 157 2021    HDL 53 2023     HDL 53 01/06/2021    LDL 69 06/12/2023    LDL 56 01/06/2021    TRIG 135 06/12/2023    TRIG 242 (H) 01/06/2021       LIVER ENZYME RESULTS:  Lab Results   Component Value Date    AST 29 01/06/2021    ALT 19 06/12/2023    ALT 26 01/06/2021       CBC RESULTS:  Lab Results   Component Value Date    WBC 8.7 11/30/2022    WBC 7.1 11/13/2019    RBC 4.01 (L) 11/30/2022    RBC 3.90 (L) 11/13/2019    HGB 14.9 11/30/2022    HGB 14.4 11/13/2019    HCT 42.2 11/30/2022    HCT 41.3 11/13/2019     (H) 11/30/2022     (H) 11/13/2019    MCH 37.2 (H) 11/30/2022    MCH 36.9 (H) 11/13/2019    MCHC 35.3 11/30/2022    MCHC 34.9 11/13/2019    RDW 11.5 11/30/2022    RDW 11.4 11/13/2019     (L) 11/30/2022     (L) 11/13/2019       BMP RESULTS:  Lab Results   Component Value Date     06/08/2023     11/13/2019    POTASSIUM 4.6 06/08/2023    POTASSIUM 3.9 11/30/2022    POTASSIUM 4.2 01/06/2021    CHLORIDE 99 06/08/2023    CHLORIDE 102 11/30/2022    CHLORIDE 103 11/13/2019    CO2 26 06/08/2023    CO2 27 11/30/2022    CO2 25 11/13/2019    ANIONGAP 12 06/08/2023    ANIONGAP 6 11/30/2022    ANIONGAP 7 11/13/2019     (H) 06/08/2023     (H) 11/30/2022     (A) 01/06/2021    BUN 22.1 06/08/2023    BUN 17 11/30/2022    BUN 17 11/13/2019    CR 0.95 06/08/2023    CR 0.77 01/06/2021    GFRESTIMATED 84 06/08/2023    GFRESTIMATED >60 01/06/2021    GFRESTBLACK >60 01/06/2021    SALOME 9.9 06/08/2023    SLAOME 9.2 11/13/2019        A1C RESULTS:  Lab Results   Component Value Date    A1C 5.5 03/25/2022    A1C 5.2 11/12/2018       INR RESULTS:  Lab Results   Component Value Date    INR 1.09 11/30/2022    INR 3.0 (H) 11/16/2021    INR 3.2 (H) 11/02/2021    INR 2.00 (H) 06/29/2021    INR 2.10 (H) 06/15/2021         ECHOCARDIOGRAM  No results found for this or any previous visit (from the past 8760 hour(s)).      No orders of the defined types were placed in this encounter.    No orders of the defined types were  placed in this encounter.    There are no discontinued medications.      No diagnosis found.      CC  No referring provider defined for this encounter.    Thank you for allowing me to participate in the care of your patient.      Sincerely,     Sandoval Marroquin MD     [facility]

## 2023-06-13 NOTE — PROGRESS NOTES
"Electrophysiology/ Clinic Note         H&P and Plan:     REASON FOR VISIT: Electrophysiology evaluation.      HISTORY OF PRESENT ILLNESS: Patient is a pleasant 72-year-old gentleman with a history of hypertension, hyperlipidemia and heart failure secondary to ischemic cardiomyopathy (CABG and ICD implantation in 1997; generator change 2015), who is here for routine follow-up.      Today, he informs he is doing well.  He has no complaints during this visit.  He denies symptoms such as chest pain, shortness of breath, headedness, near-syncope or syncope.     Scheduled to have a device to irrigation after this visit. Device was interrogated done in April showed stable lead parameters and no ventricular arrhythmia was seen.     PREVIOUS STUDIES (personally reviewed):  -Echo (2018): EF estimated 32%.  Moderate MR  -Stress PET (2018): Large transmural infarct in the basal, mid and apical anteroseptal wall.   -Echo (11/10/2021): EF of 25-30%. Moderate to severe MR and mild to moderate TR.  -Echo (6/20/2023): EF of 30-35%.  Moderate to severe MR mild TR.     ASSESSMENT AND PLAN:   1. Device care.    Lead parameters are stable.  We will continue device checks every 3 months.    2. Heart failure secondary ischemic cardiomyopathy.  Euvolemic on physical exam. We will continue therapy with Entresto, lasix,  metoprolol and Coumadin.  He will continue follow-up with heart failure team.      Sandoval Marroquin MD    Physical Exam:  Vitals: /59   Pulse 76   Ht 1.702 m (5' 7\")   Wt 82.7 kg (182 lb 4.8 oz)   BMI 28.55 kg/m      Constitutional:  AAO x3.  Pt is in NAD.  HEAD: normocephalic.  SKIN: Skin normal color, texture and turgor with no lesions or eruptions.  Eyes: PERRL, EOMI.  ENT:  Supple, normal JVP. No lymphadenopathy or thyroid enlargement.  Chest:  CTAB.  Cardiac:  RRR, normal  S1 and S2.  No murmurs rubs or gallop.    Abdomen:  Normal BS.  Soft, non-tender and non-distended.  No rebound or guarding.    Extremities:  " Pedious pulses palpable B/L.  No LE edema noticed.   Neurological: Strength and sensation grossly symmetric and intact throughout.       CURRENT MEDICATIONS:  Current Outpatient Medications   Medication Sig Dispense Refill     aspirin (ASA) 81 MG chewable tablet Take 1 tablet (81 mg) by mouth daily       atorvastatin (LIPITOR) 20 MG tablet Take 1 tablet (20 mg) by mouth daily 90 tablet 3     Cholecalciferol (VITAMIN D) 1000 UNITS capsule Take 1 capsule by mouth daily.       dapagliflozin (FARXIGA) 10 MG TABS tablet Take 1 tablet (10 mg) by mouth daily 90 tablet 3     fluocinolone (SYNALAR) 0.01 % solution PRN 60 mL 1     furosemide (LASIX) 40 MG tablet Take 1 tablet (40 mg) by mouth daily 90 tablet 3     metoprolol succinate ER (TOPROL XL) 200 MG 24 hr tablet Take 1 tablet (200 mg) by mouth daily 90 tablet 3     multivitamin w/minerals (THERA-VIT-M) tablet Take 1 tablet by mouth daily.       NIACIN 750 MG OR TBCR 2 tabs qhs       nitroGLYcerin (NITROSTAT) 0.4 MG sublingual tablet Place 1 tablet (0.4 mg) under the tongue every 5 minutes as needed for chest pain 30 tablet 11     sacubitril-valsartan (ENTRESTO)  MG per tablet Take 1 tablet by mouth 2 times daily 180 tablet 0     spironolactone (ALDACTONE) 25 MG tablet Take 1 tablet (25 mg) by mouth daily 90 tablet 3     triamcinolone (KENALOG) 0.1 % external cream Apply topically 2 times daily 45 g 1       ALLERGIES     Allergies   Allergen Reactions     Lactose      Septra [Sulfa Antibiotics]      Sulfamethoxazole-Trimethoprim Itching and Rash       PAST MEDICAL HISTORY:  Past Medical History:   Diagnosis Date     BPH (benign prostatic hyperplasia)     Dr. Whitfield, PSA 0.27 12/2012     CAD (coronary artery disease) 1997    MI      CHF (congestive heart failure) (H)      Colon polyp      History of smoking     Quit 1997     HTN (hypertension), benign      Hyperlipidemia LDL goal <100      Ischemic cardiomyopathy     Dr. Alva     Lichen planus      Normal  colonoscopy 1-3-08    Repeat 5-10 years     Pacemaker ,     ventricular tachycardia     Pleural effusion      Pneumonia      Pneumothorax     plus rib fx secondary to fall (L)     Seborrheic dermatitis      Toe fracture, right        PAST SURGICAL HISTORY:  Past Surgical History:   Procedure Laterality Date     COLONOSCOPY N/A 2015    Procedure: COLONOSCOPY;  Surgeon: Zac Austin MD;  Location:  GI     CV RIGHT HEART CATH MEASUREMENTS RECORDED N/A 2022    Procedure: Right Heart Cath;  Surgeon: Jono Santiago MD;  Location:  HEART CARDIAC CATH LAB     MOUTH SURGERY      Upper and lower dentures     ZZC CABG, ARTERIAL, TWO         FAMILY HISTORY:  The patient's family history was reviewed and is non-contributory for heart disease.    SOCIAL HISTORY:  Social History     Socioeconomic History     Marital status:      Spouse name: None     Number of children: None     Years of education: None     Highest education level: None   Tobacco Use     Smoking status: Former     Types: Cigarettes     Quit date: 10/23/1997     Years since quittin.6     Smokeless tobacco: Never   Substance and Sexual Activity     Alcohol use: Yes     Alcohol/week: 0.0 standard drinks of alcohol     Comment: 7 beers a week     Drug use: No     Sexual activity: Yes     Partners: Female   Social History Narrative    , wife Nadira.       Review of Systems:  Skin:        Eyes:       ENT:       Respiratory:       Cardiovascular:       Gastroenterology:      Genitourinary:       Musculoskeletal:       Neurologic:       Psychiatric:       Heme/Lymph/Imm:       Endocrine:           Recent Lab Results:  LIPID RESULTS:  Lab Results   Component Value Date    CHOL 149 2023    CHOL 157 2021    HDL 53 2023    HDL 53 2021    LDL 69 2023    LDL 56 2021    TRIG 135 2023    TRIG 242 (H) 2021       LIVER ENZYME RESULTS:  Lab Results   Component Value Date     AST 29 01/06/2021    ALT 19 06/12/2023    ALT 26 01/06/2021       CBC RESULTS:  Lab Results   Component Value Date    WBC 8.7 11/30/2022    WBC 7.1 11/13/2019    RBC 4.01 (L) 11/30/2022    RBC 3.90 (L) 11/13/2019    HGB 14.9 11/30/2022    HGB 14.4 11/13/2019    HCT 42.2 11/30/2022    HCT 41.3 11/13/2019     (H) 11/30/2022     (H) 11/13/2019    MCH 37.2 (H) 11/30/2022    MCH 36.9 (H) 11/13/2019    MCHC 35.3 11/30/2022    MCHC 34.9 11/13/2019    RDW 11.5 11/30/2022    RDW 11.4 11/13/2019     (L) 11/30/2022     (L) 11/13/2019       BMP RESULTS:  Lab Results   Component Value Date     06/08/2023     11/13/2019    POTASSIUM 4.6 06/08/2023    POTASSIUM 3.9 11/30/2022    POTASSIUM 4.2 01/06/2021    CHLORIDE 99 06/08/2023    CHLORIDE 102 11/30/2022    CHLORIDE 103 11/13/2019    CO2 26 06/08/2023    CO2 27 11/30/2022    CO2 25 11/13/2019    ANIONGAP 12 06/08/2023    ANIONGAP 6 11/30/2022    ANIONGAP 7 11/13/2019     (H) 06/08/2023     (H) 11/30/2022     (A) 01/06/2021    BUN 22.1 06/08/2023    BUN 17 11/30/2022    BUN 17 11/13/2019    CR 0.95 06/08/2023    CR 0.77 01/06/2021    GFRESTIMATED 84 06/08/2023    GFRESTIMATED >60 01/06/2021    GFRESTBLACK >60 01/06/2021    SALOME 9.9 06/08/2023    SALOME 9.2 11/13/2019        A1C RESULTS:  Lab Results   Component Value Date    A1C 5.5 03/25/2022    A1C 5.2 11/12/2018       INR RESULTS:  Lab Results   Component Value Date    INR 1.09 11/30/2022    INR 3.0 (H) 11/16/2021    INR 3.2 (H) 11/02/2021    INR 2.00 (H) 06/29/2021    INR 2.10 (H) 06/15/2021         ECHOCARDIOGRAM  No results found for this or any previous visit (from the past 8760 hour(s)).      No orders of the defined types were placed in this encounter.    No orders of the defined types were placed in this encounter.    There are no discontinued medications.      No diagnosis found.      CC  No referring provider defined for this encounter.

## 2023-06-16 DIAGNOSIS — I50.22 CHRONIC SYSTOLIC CONGESTIVE HEART FAILURE (H): ICD-10-CM

## 2023-06-16 RX ORDER — SACUBITRIL AND VALSARTAN 97; 103 MG/1; MG/1
1 TABLET, FILM COATED ORAL 2 TIMES DAILY
Qty: 180 TABLET | Refills: 0 | Status: SHIPPED | OUTPATIENT
Start: 2023-06-16 | End: 2023-09-22

## 2023-06-21 LAB
MDC_IDC_LEAD_IMPLANT_DT: NORMAL
MDC_IDC_LEAD_LOCATION: NORMAL
MDC_IDC_LEAD_MFG: NORMAL
MDC_IDC_LEAD_MODEL: NORMAL
MDC_IDC_LEAD_SERIAL: NORMAL
MDC_IDC_LEAD_SPECIAL_FUNCTION: NORMAL
MDC_IDC_MSMT_BATTERY_DTM: NORMAL
MDC_IDC_MSMT_BATTERY_REMAINING_LONGEVITY: 41 MO
MDC_IDC_MSMT_BATTERY_RRT_TRIGGER: 2.73
MDC_IDC_MSMT_BATTERY_STATUS: NORMAL
MDC_IDC_MSMT_BATTERY_VOLTAGE: 2.96 V
MDC_IDC_MSMT_CAP_CHARGE_DTM: NORMAL
MDC_IDC_MSMT_CAP_CHARGE_ENERGY: 18 J
MDC_IDC_MSMT_CAP_CHARGE_TIME: 3.91
MDC_IDC_MSMT_CAP_CHARGE_TYPE: NORMAL
MDC_IDC_MSMT_LEADCHNL_RV_IMPEDANCE_VALUE: 342 OHM
MDC_IDC_MSMT_LEADCHNL_RV_IMPEDANCE_VALUE: 342 OHM
MDC_IDC_MSMT_LEADCHNL_RV_PACING_THRESHOLD_AMPLITUDE: 2.5 V
MDC_IDC_MSMT_LEADCHNL_RV_PACING_THRESHOLD_PULSEWIDTH: 0.4 MS
MDC_IDC_MSMT_LEADCHNL_RV_SENSING_INTR_AMPL: 15.88 MV
MDC_IDC_MSMT_LEADCHNL_RV_SENSING_INTR_AMPL: 21.5 MV
MDC_IDC_PG_IMPLANT_DTM: NORMAL
MDC_IDC_PG_MFG: NORMAL
MDC_IDC_PG_MODEL: NORMAL
MDC_IDC_PG_SERIAL: NORMAL
MDC_IDC_PG_TYPE: NORMAL
MDC_IDC_SESS_CLINIC_NAME: NORMAL
MDC_IDC_SESS_DTM: NORMAL
MDC_IDC_SESS_TYPE: NORMAL
MDC_IDC_SET_BRADY_HYSTRATE: NORMAL
MDC_IDC_SET_BRADY_LOWRATE: 40 {BEATS}/MIN
MDC_IDC_SET_BRADY_MODE: NORMAL
MDC_IDC_SET_LEADCHNL_RV_PACING_AMPLITUDE: 5 V
MDC_IDC_SET_LEADCHNL_RV_PACING_ANODE_ELECTRODE_1: NORMAL
MDC_IDC_SET_LEADCHNL_RV_PACING_ANODE_LOCATION_1: NORMAL
MDC_IDC_SET_LEADCHNL_RV_PACING_CAPTURE_MODE: NORMAL
MDC_IDC_SET_LEADCHNL_RV_PACING_CATHODE_ELECTRODE_1: NORMAL
MDC_IDC_SET_LEADCHNL_RV_PACING_CATHODE_LOCATION_1: NORMAL
MDC_IDC_SET_LEADCHNL_RV_PACING_POLARITY: NORMAL
MDC_IDC_SET_LEADCHNL_RV_PACING_PULSEWIDTH: 1 MS
MDC_IDC_SET_LEADCHNL_RV_SENSING_ANODE_ELECTRODE_1: NORMAL
MDC_IDC_SET_LEADCHNL_RV_SENSING_ANODE_LOCATION_1: NORMAL
MDC_IDC_SET_LEADCHNL_RV_SENSING_CATHODE_ELECTRODE_1: NORMAL
MDC_IDC_SET_LEADCHNL_RV_SENSING_CATHODE_LOCATION_1: NORMAL
MDC_IDC_SET_LEADCHNL_RV_SENSING_POLARITY: NORMAL
MDC_IDC_SET_LEADCHNL_RV_SENSING_SENSITIVITY: 0.3 MV
MDC_IDC_SET_ZONE_DETECTION_BEATS_DENOMINATOR: 40 {BEATS}
MDC_IDC_SET_ZONE_DETECTION_BEATS_NUMERATOR: 30 {BEATS}
MDC_IDC_SET_ZONE_DETECTION_INTERVAL: 320 MS
MDC_IDC_SET_ZONE_DETECTION_INTERVAL: 360 MS
MDC_IDC_SET_ZONE_DETECTION_INTERVAL: 400 MS
MDC_IDC_SET_ZONE_DETECTION_INTERVAL: NORMAL
MDC_IDC_SET_ZONE_TYPE: NORMAL
MDC_IDC_STAT_BRADY_DTM_END: NORMAL
MDC_IDC_STAT_BRADY_DTM_START: NORMAL
MDC_IDC_STAT_BRADY_RV_PERCENT_PACED: 0.02 %
MDC_IDC_STAT_EPISODE_RECENT_COUNT: 0
MDC_IDC_STAT_EPISODE_RECENT_COUNT: 1
MDC_IDC_STAT_EPISODE_RECENT_COUNT_DTM_END: NORMAL
MDC_IDC_STAT_EPISODE_RECENT_COUNT_DTM_START: NORMAL
MDC_IDC_STAT_EPISODE_TOTAL_COUNT: 0
MDC_IDC_STAT_EPISODE_TOTAL_COUNT: 1
MDC_IDC_STAT_EPISODE_TOTAL_COUNT: 15
MDC_IDC_STAT_EPISODE_TOTAL_COUNT_DTM_END: NORMAL
MDC_IDC_STAT_EPISODE_TOTAL_COUNT_DTM_START: NORMAL
MDC_IDC_STAT_EPISODE_TYPE: NORMAL
MDC_IDC_STAT_TACHYTHERAPY_ATP_DELIVERED_RECENT: 0
MDC_IDC_STAT_TACHYTHERAPY_ATP_DELIVERED_TOTAL: 0
MDC_IDC_STAT_TACHYTHERAPY_RECENT_DTM_END: NORMAL
MDC_IDC_STAT_TACHYTHERAPY_RECENT_DTM_START: NORMAL
MDC_IDC_STAT_TACHYTHERAPY_SHOCKS_ABORTED_RECENT: 0
MDC_IDC_STAT_TACHYTHERAPY_SHOCKS_ABORTED_TOTAL: 0
MDC_IDC_STAT_TACHYTHERAPY_SHOCKS_DELIVERED_RECENT: 0
MDC_IDC_STAT_TACHYTHERAPY_SHOCKS_DELIVERED_TOTAL: 1
MDC_IDC_STAT_TACHYTHERAPY_TOTAL_DTM_END: NORMAL
MDC_IDC_STAT_TACHYTHERAPY_TOTAL_DTM_START: NORMAL

## 2023-07-05 ENCOUNTER — OFFICE VISIT (OUTPATIENT)
Dept: FAMILY MEDICINE | Facility: CLINIC | Age: 73
End: 2023-07-05
Payer: MEDICARE

## 2023-07-05 VITALS
RESPIRATION RATE: 16 BRPM | HEART RATE: 77 BPM | TEMPERATURE: 97.3 F | DIASTOLIC BLOOD PRESSURE: 62 MMHG | WEIGHT: 182 LBS | OXYGEN SATURATION: 95 % | SYSTOLIC BLOOD PRESSURE: 119 MMHG | HEIGHT: 67 IN | BODY MASS INDEX: 28.56 KG/M2

## 2023-07-05 DIAGNOSIS — Z00.00 ENCOUNTER FOR MEDICARE ANNUAL WELLNESS EXAM: Primary | ICD-10-CM

## 2023-07-05 DIAGNOSIS — I25.5 ISCHEMIC CARDIOMYOPATHY: ICD-10-CM

## 2023-07-05 DIAGNOSIS — Z23 HIGH PRIORITY FOR 2019-NCOV VACCINE: ICD-10-CM

## 2023-07-05 DIAGNOSIS — Z13.6 ENCOUNTER FOR ABDOMINAL AORTIC ANEURYSM (AAA) SCREENING: ICD-10-CM

## 2023-07-05 DIAGNOSIS — I10 HTN (HYPERTENSION), BENIGN: ICD-10-CM

## 2023-07-05 PROCEDURE — 99214 OFFICE O/P EST MOD 30 MIN: CPT | Mod: 25 | Performed by: INTERNAL MEDICINE

## 2023-07-05 PROCEDURE — G0439 PPPS, SUBSEQ VISIT: HCPCS | Performed by: INTERNAL MEDICINE

## 2023-07-05 PROCEDURE — 91312 COVID-19 BIVALENT 12+ (PFIZER): CPT | Performed by: INTERNAL MEDICINE

## 2023-07-05 PROCEDURE — 0124A COVID-19 BIVALENT 12+ (PFIZER): CPT | Performed by: INTERNAL MEDICINE

## 2023-07-05 NOTE — PROGRESS NOTES
SUBJECTIVE:   Tae is a 72 year old who presents for Preventive Visit.    Are you in the first 12 months of your Medicare coverage?  No    HPI    HTN  The patient has a history of hypertension.  Blood pressure is noted as below.  Compliance with medication is noted.  Side effects of medication are not described.  Symptoms of uncontrolled hypertension including chest pain, dizziness, and vision changes have not been observed.    CHF:  Medications noted.  He is on Farxiga and Entresto.       Have you ever done Advance Care Planning? (For example, a Health Directive, POLST, or a discussion with a medical provider or your loved ones about your wishes): No, advance care planning information given to patient to review.  Patient declined advance care planning discussion at this time.     Fall risk  Fallen 2 or more times in the past year?: No  Any fall with injury in the past year?: No    Cognitive Screening   1) Repeat 3 items (Leader, Season, Table)    2) Clock draw: NORMAL  3) 3 item recall: Recalls 3 objects  Results: 3 items recalled: COGNITIVE IMPAIRMENT LESS LIKELY    Mini-CogTM Copyright ANKITA Reeves. Licensed by the author for use in [facility]; reprinted with permission (tere@[facility]). All rights reserved.        Reviewed and updated as needed this visit by clinical staff   Tobacco  Allergies  Meds              Reviewed and updated as needed this visit by Provider                 Social History     Tobacco Use     Smoking status: Former     Types: Cigarettes     Quit date: 10/23/1997     Years since quittin.7     Smokeless tobacco: Never   Substance Use Topics     Alcohol use: Yes     Alcohol/week: 0.0 standard drinks of alcohol     Comment: 7 beers a week             2023     2:31 PM   Alcohol Use   Prescreen: >3 drinks/day or >7 drinks/week? No          No data to display              Do you have a current opioid prescription? No  Do you use any other controlled substances or medications  "that are not prescribed by a provider? None      Current providers sharing in care for this patient include:   Patient Care Team:  Rory Gambino MD as PCP - General (Internal Medicine)  Danika Ye PA-C as Assigned Heart and Vascular Provider  Rory Gambino MD as Assigned PCP  Sandoval Marroquin MD as MD (Cardiovascular Disease)    The following health maintenance items are reviewed in Epic and correct as of today:  Health Maintenance   Topic Date Due     AORTIC ANEURYSM SCREENING (SYSTEM ASSIGNED)  Never done     HF ACTION PLAN  03/26/2014     DTAP/TDAP/TD IMMUNIZATION (2 - Td or Tdap) 03/26/2023     COVID-19 Vaccine (6 - Pfizer series) 04/16/2023     ANNUAL REVIEW OF HM ORDERS  05/20/2023     INFLUENZA VACCINE (1) 09/01/2023     CBC  11/30/2023     BMP  12/08/2023     ALT  06/12/2024     LIPID  06/12/2024     TSH W/FREE T4 REFLEX  06/12/2024     MEDICARE ANNUAL WELLNESS VISIT  07/05/2024     FALL RISK ASSESSMENT  07/05/2024     COLORECTAL CANCER SCREENING  02/12/2025     ADVANCE CARE PLANNING  07/05/2028     HEPATITIS C SCREENING  Completed     PHQ-2 (once per calendar year)  Completed     Pneumococcal Vaccine: 65+ Years  Completed     ZOSTER IMMUNIZATION  Completed     IPV IMMUNIZATION  Aged Out     MENINGITIS IMMUNIZATION  Aged Out       Review of Systems      OBJECTIVE:   /62 (BP Location: Left arm, Patient Position: Sitting, Cuff Size: Adult Large)   Pulse 77   Temp 97.3  F (36.3  C)   Resp 16   Ht 1.702 m (5' 7\")   Wt 82.6 kg (182 lb)   SpO2 95%   BMI 28.51 kg/m   Estimated body mass index is 28.51 kg/m  as calculated from the following:    Height as of this encounter: 1.702 m (5' 7\").    Weight as of this encounter: 82.6 kg (182 lb).  Physical Exam  GENERAL: healthy, alert and no distress  NECK: no adenopathy, no asymmetry, masses, or scars and thyroid normal to palpation  RESP: lungs clear to auscultation - no rales, rhonchi or wheezes  CV: regular rate and rhythm, normal " "S1 S2, no S3 or S4, no murmur, click or rub, no peripheral edema and peripheral pulses strong  ABDOMEN: soft, nontender, no hepatosplenomegaly, no masses and bowel sounds normal  MS: no gross musculoskeletal defects noted, no edema        ASSESSMENT / PLAN:       ICD-10-CM    1. Encounter for Medicare annual wellness exam  Z00.00       2. HTN (hypertension), benign   Stable. No changes I10       3. Ischemic cardiomyopathy   On faxiga and entresto.  Sees cards. No changes made today I25.5       4. Encounter for abdominal aortic aneurysm (AAA) screening   Prior smoker.  Rationale for screening discussed.  He agrees.   Z13.6 US Abdominal Aorta Imaging                COUNSELING:  Reviewed preventive health counseling, as reflected in patient instructions      BMI:   Estimated body mass index is 28.51 kg/m  as calculated from the following:    Height as of this encounter: 1.702 m (5' 7\").    Weight as of this encounter: 82.6 kg (182 lb).         He reports that he quit smoking about 25 years ago. His smoking use included cigarettes. He has never used smokeless tobacco.      Appropriate preventive services were discussed with this patient, including applicable screening as appropriate for cardiovascular disease, diabetes, osteopenia/osteoporosis, and glaucoma.  As appropriate for age/gender, discussed screening for colorectal cancer, prostate cancer, breast cancer, and cervical cancer. Checklist reviewing preventive services available has been given to the patient.    Reviewed patients plan of care and provided an AVS. The Basic Care Plan (routine screening as documented in Health Maintenance) for Tae meets the Care Plan requirement. This Care Plan has been established and reviewed with the Patient.      Rory Gambino MD  [facility]    Identified Health Risks:    I have reviewed Opioid Use Disorder and Substance Use Disorder risk factors and made any needed referrals.     "

## 2023-07-05 NOTE — PATIENT INSTRUCTIONS
Covid booster today    ULTRASOUND for aneurysm screening:  We have ordered an imaging study.  Unless otherwise indicated, please call the number below to schedule.   [facility] Scheduling:    [phone]    Results will be conveyed by MyChart, telephone or letter.         Patient Education   Personalized Prevention Plan  You are due for the preventive services outlined below.  Your care team is available to assist you in scheduling these services.  If you have already completed any of these items, please share that information with your care team to update in your medical record.  Health Maintenance Due   Topic Date Due    AORTIC ANEURYSM SCREENING (SYSTEM ASSIGNED)  Never done    Heart Failure Action Plan  03/26/2014    Diptheria Tetanus Pertussis (DTAP/TDAP/TD) Vaccine (2 - Td or Tdap) 03/26/2023    COVID-19 Vaccine (6 - Pfizer series) 04/16/2023    ANNUAL REVIEW OF HM ORDERS  05/20/2023

## 2023-07-11 ENCOUNTER — ANCILLARY PROCEDURE (OUTPATIENT)
Dept: ULTRASOUND IMAGING | Facility: CLINIC | Age: 73
End: 2023-07-11
Attending: INTERNAL MEDICINE
Payer: MEDICARE

## 2023-07-11 DIAGNOSIS — Z13.6 ENCOUNTER FOR ABDOMINAL AORTIC ANEURYSM (AAA) SCREENING: ICD-10-CM

## 2023-07-11 PROCEDURE — 76775 US EXAM ABDO BACK WALL LIM: CPT

## 2023-08-22 DIAGNOSIS — E78.5 HYPERLIPIDEMIA LDL GOAL <100: ICD-10-CM

## 2023-08-22 RX ORDER — ATORVASTATIN CALCIUM 20 MG/1
20 TABLET, FILM COATED ORAL DAILY
Qty: 90 TABLET | Refills: 0 | Status: SHIPPED | OUTPATIENT
Start: 2023-08-22 | End: 2023-11-14

## 2023-08-30 ENCOUNTER — OFFICE VISIT (OUTPATIENT)
Dept: CARDIOLOGY | Facility: CLINIC | Age: 73
End: 2023-08-30
Payer: MEDICARE

## 2023-08-30 VITALS
HEART RATE: 77 BPM | DIASTOLIC BLOOD PRESSURE: 74 MMHG | HEIGHT: 67 IN | BODY MASS INDEX: 28.47 KG/M2 | SYSTOLIC BLOOD PRESSURE: 123 MMHG | WEIGHT: 181.4 LBS

## 2023-08-30 DIAGNOSIS — I50.22 CHRONIC SYSTOLIC HEART FAILURE (H): Primary | ICD-10-CM

## 2023-08-30 PROCEDURE — 99214 OFFICE O/P EST MOD 30 MIN: CPT | Performed by: INTERNAL MEDICINE

## 2023-08-30 NOTE — LETTER
8/30/2023    Rory Gambino MD  [address]  [place] [address]    RE: Tae ISAAC Kindra       Dear Colleague,     I had the pleasure of seeing Tae [facility]guelles in the [facility].  CARDIOLOGY CLINIC CONSULTATION    PRIMARY CARE PHYSICIAN:  Rory Gambino    HISTORY OF PRESENT ILLNESS:  Tae is a very delightful 72-year-old gentleman, who transferred care from [facility] where he used to follow with Dr. Brendan Alva, and after his detention, he established care with us at [facility] in [place] in 2021 and saw me in heart failure consultation then.  He has the following set of complex medical issues:    1.  History of a large anterior MI in 1997 with a failed attempted PCI and subsequent emergent bypass surgery, unclear details, but in 1997.  2.  Associated ischemic cardiomyopathy with an EF of 30%.  3.  Secondary ICD implanted during that admission, subsequent gen change in 2015.  4.  Chronic anticoagulation for unclear indications at this time.  The patient says he was on it after his discharge.  No reported history of DVT, PE, AFib or LV thrombus.  This has been stopped due to no clear indication for that.  5.  Hypertension. Hyperlipidemia. Prior history of smoking.  6.  History of ICD shock in 2018 with an echocardiogram at that time showing EF of 32% and a PET stress showing no ischemia, but a relatively large anterior infarct.   7.  Asymptomatic 3+ functional ischemic mitral regurgitation on medical therapy without symptoms    The patient is here for routine follow-up.  He sees Danika More in the cardiology core clinic.  The patient has NYHA class I heart failure.  He has no angina.  His last few echocardiograms have shown 3+ mitral regurgitation.  Given that he is asymptomatic, this has been left to medical management.  He has not had a DAVIDA.  In regards to guideline directed medical therapy for LV dysfunction, he is on 4 drug regimen.  He is on a  stable dose of diuretic.  Denies any recent heart failure admissions.  Overall feels quite well.  He can exercise for 45 minutes on his treadmill.  He has absolutely no functional limitations that he reports of.    Last echocardiogram showed stable ejection fraction at 30 to 35% with moderate pulmonary hypertension and 3+ mitral regurgitation.    PAST MEDICAL HISTORY:  Past Medical History:   Diagnosis Date    BPH (benign prostatic hyperplasia)     Dr. Whitfield, PSA 0.27 12/2012    CAD (coronary artery disease) 1997    MI     CHF (congestive heart failure) (H)     Colon polyp     History of smoking     Quit 1997    HTN (hypertension), benign     Hyperlipidemia LDL goal <100     Ischemic cardiomyopathy     Dr. Alva    Lichen planus     Normal colonoscopy 1-3-08    Repeat 5-10 years    Pacemaker 1997, 2005    ventricular tachycardia    Pleural effusion     Pneumonia 1998    Pneumothorax     plus rib fx secondary to fall (L)    Seborrheic dermatitis     Toe fracture, right        MEDICATIONS:  Current Outpatient Medications   Medication    aspirin (ASA) 81 MG chewable tablet    atorvastatin (LIPITOR) 20 MG tablet    Cholecalciferol (VITAMIN D) 1000 UNITS capsule    dapagliflozin (FARXIGA) 10 MG TABS tablet    fluocinolone (SYNALAR) 0.01 % solution    furosemide (LASIX) 40 MG tablet    metoprolol succinate ER (TOPROL XL) 200 MG 24 hr tablet    multivitamin w/minerals (THERA-VIT-M) tablet    NIACIN 750 MG OR TBCR    nitroGLYcerin (NITROSTAT) 0.4 MG sublingual tablet    sacubitril-valsartan (ENTRESTO)  MG per tablet    spironolactone (ALDACTONE) 25 MG tablet    triamcinolone (KENALOG) 0.1 % external cream     No current facility-administered medications for this visit.       SOCIAL HISTORY:  I have reviewed this patient's social history and updated it with pertinent information if needed. Tae Arguelles  reports that he quit smoking about 25 years ago. His smoking use included cigarettes. He has never used  smokeless tobacco. He reports current alcohol use. He reports that he does not use drugs.    PHYSICAL EXAM:  Pulse:  [77] 77  BP: (123-132)/(73-74) 123/74  181 lbs 6.4 oz    Constitutional: alert, no distress  Respiratory: Good bilateral air entry  Cardiovascular: Regular heart sounds soft systolic murmur no edema normal JVP  GI: nondistended  Neuropsychiatric: appropriate affact    ASSESSMENT: Pertinent issues addressed/ reviewed during this cardiology visit  Chronic systolic heart failure with reduced ejection fraction  Functional ischemic mitral regurgitation moderate to severe, asymptomatic  Secondary ICD with ICD therapies    RECOMMENDATIONS:  Patient is completely asymptomatic from a cardiac standpoint.  His blood pressure is fine.  He is tolerating 4 drug regimen.  No recent heart failure decompensations.  I have not made any changes to his regimen.  We reviewed his echocardiography studies and discussed mitral regurgitation today.  Given that he is asymptomatic and has 3+ MR, we will continue to conservatively manage this with heart failure therapy.  If this worsens in the future, we can entertain possibility of MitraClip implantation.  His LV at this time is borderline dilated and his EF is stable.    I will see him back routinely in clinic in a year from now sooner if anything changes clinically.  We will get an echocardiogram at next visit to assess LV size function and mitral regurgitation.    It was a pleasure seeing this patient in clinic today. Please do not hesitate to contact me with any future questions.     KYA Taylor, [facility]  Cardiology - [facility]  August 30, 2023    Review of the result(s) of each unique test - Last echocardiogram lipids device interrogation CBC BMP     The level of medical decision making during this visit was of moderate complexity.    This note was completed in part using dictation via the Dragon voice recognition software. Some word and grammatical errors may occur  and must be interpreted in the appropriate clinical context.  If there are any questions pertaining to this issue, please contact me for further clarification.      Thank you for allowing me to participate in the care of your patient.      Sincerely,     Jono Santiago MD     [facility]  cc:   Danika Ye PA-C  [address]  [place] [id number]

## 2023-08-30 NOTE — PROGRESS NOTES
CARDIOLOGY CLINIC CONSULTATION    PRIMARY CARE PHYSICIAN:  Rory Gambino    HISTORY OF PRESENT ILLNESS:  Tae is a very delightful 72-year-old gentleman, who transferred care from [facility] where he used to follow with Dr. Brendan Alva, and after his custodial, he established care with us at [facility] in [place] in 2021 and saw me in heart failure consultation then.  He has the following set of complex medical issues:    1.  History of a large anterior MI in 1997 with a failed attempted PCI and subsequent emergent bypass surgery, unclear details, but in 1997.  2.  Associated ischemic cardiomyopathy with an EF of 30%.  3.  Secondary ICD implanted during that admission, subsequent gen change in 2015.  4.  Chronic anticoagulation for unclear indications at this time.  The patient says he was on it after his discharge.  No reported history of DVT, PE, AFib or LV thrombus.  This has been stopped due to no clear indication for that.  5.  Hypertension. Hyperlipidemia. Prior history of smoking.  6.  History of ICD shock in 2018 with an echocardiogram at that time showing EF of 32% and a PET stress showing no ischemia, but a relatively large anterior infarct.   7.  Asymptomatic 3+ functional ischemic mitral regurgitation on medical therapy without symptoms    The patient is here for routine follow-up.  He sees Danika More in the cardiology core clinic.  The patient has NYHA class I heart failure.  He has no angina.  His last few echocardiograms have shown 3+ mitral regurgitation.  Given that he is asymptomatic, this has been left to medical management.  He has not had a DAVIDA.  In regards to guideline directed medical therapy for LV dysfunction, he is on 4 drug regimen.  He is on a stable dose of diuretic.  Denies any recent heart failure admissions.  Overall feels quite well.  He can exercise for 45 minutes on his treadmill.  He has absolutely no functional limitations that he reports  of.    Last echocardiogram showed stable ejection fraction at 30 to 35% with moderate pulmonary hypertension and 3+ mitral regurgitation.    PAST MEDICAL HISTORY:  Past Medical History:   Diagnosis Date    BPH (benign prostatic hyperplasia)     Dr. Whitfield, PSA 0.27 12/2012    CAD (coronary artery disease) 1997    MI     CHF (congestive heart failure) (H)     Colon polyp     History of smoking     Quit 1997    HTN (hypertension), benign     Hyperlipidemia LDL goal <100     Ischemic cardiomyopathy     Dr. Alva    Lichen planus     Normal colonoscopy 1-3-08    Repeat 5-10 years    Pacemaker 1997, 2005    ventricular tachycardia    Pleural effusion     Pneumonia 1998    Pneumothorax     plus rib fx secondary to fall (L)    Seborrheic dermatitis     Toe fracture, right        MEDICATIONS:  Current Outpatient Medications   Medication    aspirin (ASA) 81 MG chewable tablet    atorvastatin (LIPITOR) 20 MG tablet    Cholecalciferol (VITAMIN D) 1000 UNITS capsule    dapagliflozin (FARXIGA) 10 MG TABS tablet    fluocinolone (SYNALAR) 0.01 % solution    furosemide (LASIX) 40 MG tablet    metoprolol succinate ER (TOPROL XL) 200 MG 24 hr tablet    multivitamin w/minerals (THERA-VIT-M) tablet    NIACIN 750 MG OR TBCR    nitroGLYcerin (NITROSTAT) 0.4 MG sublingual tablet    sacubitril-valsartan (ENTRESTO)  MG per tablet    spironolactone (ALDACTONE) 25 MG tablet    triamcinolone (KENALOG) 0.1 % external cream     No current facility-administered medications for this visit.       SOCIAL HISTORY:  I have reviewed this patient's social history and updated it with pertinent information if needed. Tae ISAAC Kindra  reports that he quit smoking about 25 years ago. His smoking use included cigarettes. He has never used smokeless tobacco. He reports current alcohol use. He reports that he does not use drugs.    PHYSICAL EXAM:  Pulse:  [77] 77  BP: (123-132)/(73-74) 123/74  181 lbs 6.4 oz    Constitutional: alert, no  distress  Respiratory: Good bilateral air entry  Cardiovascular: Regular heart sounds soft systolic murmur no edema normal JVP  GI: nondistended  Neuropsychiatric: appropriate affact    ASSESSMENT: Pertinent issues addressed/ reviewed during this cardiology visit  Chronic systolic heart failure with reduced ejection fraction  Functional ischemic mitral regurgitation moderate to severe, asymptomatic  Secondary ICD with ICD therapies    RECOMMENDATIONS:  Patient is completely asymptomatic from a cardiac standpoint.  His blood pressure is fine.  He is tolerating 4 drug regimen.  No recent heart failure decompensations.  I have not made any changes to his regimen.  We reviewed his echocardiography studies and discussed mitral regurgitation today.  Given that he is asymptomatic and has 3+ MR, we will continue to conservatively manage this with heart failure therapy.  If this worsens in the future, we can entertain possibility of MitraClip implantation.  His LV at this time is borderline dilated and his EF is stable.    I will see him back routinely in clinic in a year from now sooner if anything changes clinically.  We will get an echocardiogram at next visit to assess LV size function and mitral regurgitation.    It was a pleasure seeing this patient in clinic today. Please do not hesitate to contact me with any future questions.     KYA aTylor, [facility]  Cardiology - [facility] Heart  August 30, 2023    Review of the result(s) of each unique test - Last echocardiogram lipids device interrogation CBC BMP     The level of medical decision making during this visit was of moderate complexity.    This note was completed in part using dictation via the Dragon voice recognition software. Some word and grammatical errors may occur and must be interpreted in the appropriate clinical context.  If there are any questions pertaining to this issue, please contact me for further clarification.

## 2023-09-18 ENCOUNTER — ANCILLARY PROCEDURE (OUTPATIENT)
Dept: CARDIOLOGY | Facility: CLINIC | Age: 73
End: 2023-09-18
Attending: INTERNAL MEDICINE
Payer: MEDICARE

## 2023-09-18 DIAGNOSIS — Z95.810 ICD (IMPLANTABLE CARDIOVERTER-DEFIBRILLATOR) IN PLACE: ICD-10-CM

## 2023-09-18 DIAGNOSIS — I25.5 ISCHEMIC CARDIOMYOPATHY: ICD-10-CM

## 2023-09-18 PROCEDURE — 93295 DEV INTERROG REMOTE 1/2/MLT: CPT | Performed by: INTERNAL MEDICINE

## 2023-09-18 PROCEDURE — 93296 REM INTERROG EVL PM/IDS: CPT | Performed by: INTERNAL MEDICINE

## 2023-09-19 LAB
MDC_IDC_EPISODE_DTM: NORMAL
MDC_IDC_EPISODE_DURATION: 2 S
MDC_IDC_EPISODE_ID: 50
MDC_IDC_EPISODE_TYPE: NORMAL
MDC_IDC_LEAD_IMPLANT_DT: NORMAL
MDC_IDC_LEAD_LOCATION: NORMAL
MDC_IDC_LEAD_MFG: NORMAL
MDC_IDC_LEAD_MODEL: NORMAL
MDC_IDC_LEAD_SERIAL: NORMAL
MDC_IDC_LEAD_SPECIAL_FUNCTION: NORMAL
MDC_IDC_MSMT_BATTERY_DTM: NORMAL
MDC_IDC_MSMT_BATTERY_REMAINING_LONGEVITY: 39 MO
MDC_IDC_MSMT_BATTERY_RRT_TRIGGER: 2.73
MDC_IDC_MSMT_BATTERY_STATUS: NORMAL
MDC_IDC_MSMT_BATTERY_VOLTAGE: 2.96 V
MDC_IDC_MSMT_CAP_CHARGE_DTM: NORMAL
MDC_IDC_MSMT_CAP_CHARGE_ENERGY: 18 J
MDC_IDC_MSMT_CAP_CHARGE_TIME: 3.97
MDC_IDC_MSMT_CAP_CHARGE_TYPE: NORMAL
MDC_IDC_MSMT_LEADCHNL_RV_IMPEDANCE_VALUE: 323 OHM
MDC_IDC_MSMT_LEADCHNL_RV_IMPEDANCE_VALUE: 323 OHM
MDC_IDC_MSMT_LEADCHNL_RV_PACING_THRESHOLD_AMPLITUDE: 2.5 V
MDC_IDC_MSMT_LEADCHNL_RV_PACING_THRESHOLD_PULSEWIDTH: 0.4 MS
MDC_IDC_MSMT_LEADCHNL_RV_SENSING_INTR_AMPL: 14.25 MV
MDC_IDC_MSMT_LEADCHNL_RV_SENSING_INTR_AMPL: 14.25 MV
MDC_IDC_PG_IMPLANT_DTM: NORMAL
MDC_IDC_PG_MFG: NORMAL
MDC_IDC_PG_MODEL: NORMAL
MDC_IDC_PG_SERIAL: NORMAL
MDC_IDC_PG_TYPE: NORMAL
MDC_IDC_SESS_CLINIC_NAME: NORMAL
MDC_IDC_SESS_DTM: NORMAL
MDC_IDC_SESS_TYPE: NORMAL
MDC_IDC_SET_BRADY_HYSTRATE: NORMAL
MDC_IDC_SET_BRADY_LOWRATE: 40 {BEATS}/MIN
MDC_IDC_SET_BRADY_MODE: NORMAL
MDC_IDC_SET_LEADCHNL_RV_PACING_AMPLITUDE: 5 V
MDC_IDC_SET_LEADCHNL_RV_PACING_ANODE_ELECTRODE_1: NORMAL
MDC_IDC_SET_LEADCHNL_RV_PACING_ANODE_LOCATION_1: NORMAL
MDC_IDC_SET_LEADCHNL_RV_PACING_CAPTURE_MODE: NORMAL
MDC_IDC_SET_LEADCHNL_RV_PACING_CATHODE_ELECTRODE_1: NORMAL
MDC_IDC_SET_LEADCHNL_RV_PACING_CATHODE_LOCATION_1: NORMAL
MDC_IDC_SET_LEADCHNL_RV_PACING_POLARITY: NORMAL
MDC_IDC_SET_LEADCHNL_RV_PACING_PULSEWIDTH: 1 MS
MDC_IDC_SET_LEADCHNL_RV_SENSING_ANODE_ELECTRODE_1: NORMAL
MDC_IDC_SET_LEADCHNL_RV_SENSING_ANODE_LOCATION_1: NORMAL
MDC_IDC_SET_LEADCHNL_RV_SENSING_CATHODE_ELECTRODE_1: NORMAL
MDC_IDC_SET_LEADCHNL_RV_SENSING_CATHODE_LOCATION_1: NORMAL
MDC_IDC_SET_LEADCHNL_RV_SENSING_POLARITY: NORMAL
MDC_IDC_SET_LEADCHNL_RV_SENSING_SENSITIVITY: 0.3 MV
MDC_IDC_SET_ZONE_DETECTION_BEATS_DENOMINATOR: 40 {BEATS}
MDC_IDC_SET_ZONE_DETECTION_BEATS_NUMERATOR: 30 {BEATS}
MDC_IDC_SET_ZONE_DETECTION_INTERVAL: 320 MS
MDC_IDC_SET_ZONE_DETECTION_INTERVAL: 360 MS
MDC_IDC_SET_ZONE_DETECTION_INTERVAL: 400 MS
MDC_IDC_SET_ZONE_DETECTION_INTERVAL: NORMAL
MDC_IDC_SET_ZONE_TYPE: NORMAL
MDC_IDC_STAT_BRADY_DTM_END: NORMAL
MDC_IDC_STAT_BRADY_DTM_START: NORMAL
MDC_IDC_STAT_BRADY_RV_PERCENT_PACED: 0.02 %
MDC_IDC_STAT_EPISODE_RECENT_COUNT: 0
MDC_IDC_STAT_EPISODE_RECENT_COUNT: 1
MDC_IDC_STAT_EPISODE_RECENT_COUNT_DTM_END: NORMAL
MDC_IDC_STAT_EPISODE_RECENT_COUNT_DTM_START: NORMAL
MDC_IDC_STAT_EPISODE_TOTAL_COUNT: 0
MDC_IDC_STAT_EPISODE_TOTAL_COUNT: 1
MDC_IDC_STAT_EPISODE_TOTAL_COUNT: 16
MDC_IDC_STAT_EPISODE_TOTAL_COUNT_DTM_END: NORMAL
MDC_IDC_STAT_EPISODE_TOTAL_COUNT_DTM_START: NORMAL
MDC_IDC_STAT_EPISODE_TYPE: NORMAL
MDC_IDC_STAT_TACHYTHERAPY_ATP_DELIVERED_RECENT: 0
MDC_IDC_STAT_TACHYTHERAPY_ATP_DELIVERED_TOTAL: 0
MDC_IDC_STAT_TACHYTHERAPY_RECENT_DTM_END: NORMAL
MDC_IDC_STAT_TACHYTHERAPY_RECENT_DTM_START: NORMAL
MDC_IDC_STAT_TACHYTHERAPY_SHOCKS_ABORTED_RECENT: 0
MDC_IDC_STAT_TACHYTHERAPY_SHOCKS_ABORTED_TOTAL: 0
MDC_IDC_STAT_TACHYTHERAPY_SHOCKS_DELIVERED_RECENT: 0
MDC_IDC_STAT_TACHYTHERAPY_SHOCKS_DELIVERED_TOTAL: 1
MDC_IDC_STAT_TACHYTHERAPY_TOTAL_DTM_END: NORMAL
MDC_IDC_STAT_TACHYTHERAPY_TOTAL_DTM_START: NORMAL

## 2023-09-22 DIAGNOSIS — I50.22 CHRONIC SYSTOLIC CONGESTIVE HEART FAILURE (H): ICD-10-CM

## 2023-09-22 RX ORDER — SACUBITRIL AND VALSARTAN 97; 103 MG/1; MG/1
1 TABLET, FILM COATED ORAL 2 TIMES DAILY
Qty: 180 TABLET | Refills: 3 | Status: SHIPPED | OUTPATIENT
Start: 2023-09-22 | End: 2024-08-27

## 2023-09-22 NOTE — TELEPHONE ENCOUNTER
Received refill request for:  Entresto  Last OV was: 8/30/23 with Dr. Santiago  Labs:    Component      Latest Ref Rng 6/8/2023  9:59 AM   Sodium      136 - 145 mmol/L 137    Potassium      3.4 - 5.3 mmol/L 4.6    Chloride      98 - 107 mmol/L 99    Carbon Dioxide (CO2)      22 - 29 mmol/L 26    Anion Gap      7 - 15 mmol/L 12    Urea Nitrogen      8.0 - 23.0 mg/dL 22.1    Creatinine      0.67 - 1.17 mg/dL 0.95    Calcium      8.8 - 10.2 mg/dL 9.9    Glucose      70 - 99 mg/dL 155 (H)    GFR Estimate      >60 mL/min/1.73m2 84    N-Terminal Pro Bnp      0 - 900 pg/mL 544       Legend:  (H) High  Future Appointments   Date Time Provider Department Center   12/1/2023 10:30 AM EICUS1 [facility] [place] [facility]   12/5/2023  1:30 PM Rory Gambino MD [facility]   1/3/2024 12:00 AM ALMEIDA DCR2 [facility]IN   7/8/2024  2:00 PM Rory Gambino MD [facility] CS     New script sent to: Jean    Pt is due for follow-up in 2024.    NIKI Callahan   12:45 PM 9/22/2023    CORE nurse line M-F 8a-4p: [phone]

## 2023-10-13 DIAGNOSIS — I50.22 CHRONIC SYSTOLIC CONGESTIVE HEART FAILURE (H): ICD-10-CM

## 2023-10-13 RX ORDER — DAPAGLIFLOZIN 10 MG/1
10 TABLET, FILM COATED ORAL DAILY
Qty: 90 TABLET | Refills: 3 | Status: SHIPPED | OUTPATIENT
Start: 2023-10-13 | End: 2024-08-27

## 2023-10-26 DIAGNOSIS — I25.5 ISCHEMIC CARDIOMYOPATHY: ICD-10-CM

## 2023-10-26 RX ORDER — NITROGLYCERIN 0.4 MG/1
0.4 TABLET SUBLINGUAL EVERY 5 MIN PRN
Qty: 30 TABLET | Refills: 1 | Status: SHIPPED | OUTPATIENT
Start: 2023-10-26 | End: 2024-08-27

## 2023-10-26 NOTE — TELEPHONE ENCOUNTER
Pt left  stating he noticed his SL nitroglycerin has . He has not had to use recently, but would like an updated refill. Yeimi PRINCE 2023, 3:15 PM

## 2023-11-14 DIAGNOSIS — I25.5 ISCHEMIC CARDIOMYOPATHY: ICD-10-CM

## 2023-11-14 DIAGNOSIS — E78.5 HYPERLIPIDEMIA LDL GOAL <100: ICD-10-CM

## 2023-11-14 RX ORDER — METOPROLOL SUCCINATE 200 MG/1
200 TABLET, EXTENDED RELEASE ORAL DAILY
Qty: 90 TABLET | Refills: 2 | Status: SHIPPED | OUTPATIENT
Start: 2023-11-14 | End: 2024-08-12

## 2023-11-14 RX ORDER — ATORVASTATIN CALCIUM 20 MG/1
20 TABLET, FILM COATED ORAL DAILY
Qty: 90 TABLET | Refills: 2 | Status: SHIPPED | OUTPATIENT
Start: 2023-11-14 | End: 2024-08-20

## 2023-11-15 DIAGNOSIS — I50.9 CHF (CONGESTIVE HEART FAILURE) (H): Primary | ICD-10-CM

## 2023-11-15 DIAGNOSIS — I25.5 ISCHEMIC CARDIOMYOPATHY: ICD-10-CM

## 2023-11-15 DIAGNOSIS — I50.22 CHRONIC SYSTOLIC CONGESTIVE HEART FAILURE (H): ICD-10-CM

## 2023-11-15 DIAGNOSIS — I10 HTN (HYPERTENSION), BENIGN: ICD-10-CM

## 2023-11-15 RX ORDER — SPIRONOLACTONE 25 MG/1
25 TABLET ORAL DAILY
Qty: 90 TABLET | Refills: 2 | Status: SHIPPED | OUTPATIENT
Start: 2023-11-15 | End: 2024-08-12

## 2023-11-15 NOTE — TELEPHONE ENCOUNTER
Current Outpatient Medications   Medication Instructions     spironolactone (ALDACTONE) 25 mg, Oral, DAILY     [facility] Refill Guideline reviewed.  Medication meets criteria for refill.  Will need BMP lab for further refills at annual visit 8/2024. BMP lab order entered per RN guidelines and provider to sign. Sheyla Lock RN on 11/15/2023 at 3:41 PM

## 2023-11-21 ENCOUNTER — TELEPHONE (OUTPATIENT)
Dept: FAMILY MEDICINE | Facility: CLINIC | Age: 73
End: 2023-11-21
Payer: MEDICARE

## 2023-11-21 NOTE — TELEPHONE ENCOUNTER
Received a call from the patient wondering if he should receive the RSV vaccine? Triage RN advised patient he would qualify for the RSV vaccine.     Patient would like message sent to PCP for review. Patient is also wondering if there are any side effects associated with the RSV vaccine?    Will route to PCP for review.     Can we leave a detailed message on this number? YES  Phone number patient can be reached at: Home number on file [phone] (home)    Sparkle Holbrook RN  [facility] Triage

## 2023-12-01 ENCOUNTER — ANCILLARY PROCEDURE (OUTPATIENT)
Dept: ULTRASOUND IMAGING | Facility: CLINIC | Age: 73
End: 2023-12-01
Attending: UROLOGY
Payer: MEDICARE

## 2023-12-01 DIAGNOSIS — D41.02 NEOPLASM OF UNCERTAIN BEHAVIOR OF LEFT KIDNEY: ICD-10-CM

## 2023-12-01 LAB — RADIOLOGIST FLAGS: NORMAL

## 2023-12-01 PROCEDURE — 76770 US EXAM ABDO BACK WALL COMP: CPT

## 2024-01-03 ENCOUNTER — ANCILLARY PROCEDURE (OUTPATIENT)
Dept: CARDIOLOGY | Facility: CLINIC | Age: 74
End: 2024-01-03
Attending: INTERNAL MEDICINE
Payer: MEDICARE

## 2024-01-03 DIAGNOSIS — I25.5 ISCHEMIC CARDIOMYOPATHY: ICD-10-CM

## 2024-01-03 DIAGNOSIS — Z95.810 ICD (IMPLANTABLE CARDIOVERTER-DEFIBRILLATOR) IN PLACE: ICD-10-CM

## 2024-01-03 PROCEDURE — 93295 DEV INTERROG REMOTE 1/2/MLT: CPT | Performed by: INTERNAL MEDICINE

## 2024-01-03 PROCEDURE — 93296 REM INTERROG EVL PM/IDS: CPT | Performed by: INTERNAL MEDICINE

## 2024-01-05 ENCOUNTER — TELEPHONE (OUTPATIENT)
Dept: CARDIOLOGY | Facility: CLINIC | Age: 74
End: 2024-01-05
Payer: MEDICARE

## 2024-01-05 NOTE — TELEPHONE ENCOUNTER
Pt left , he is wondering about his results from his remote check on 1/3/2024, he didn't get a call this time.     Chart reviewed. A letter was sent with results and plan. Remote check WNL with no arrhythmias, stable lead measurements, and battery 3.1 yrs estimated longevity.     Called pt. Gave him results. He states understanding.

## 2024-01-08 LAB
MDC_IDC_LEAD_CONNECTION_STATUS: NORMAL
MDC_IDC_LEAD_IMPLANT_DT: NORMAL
MDC_IDC_LEAD_LOCATION: NORMAL
MDC_IDC_LEAD_MFG: NORMAL
MDC_IDC_LEAD_MODEL: NORMAL
MDC_IDC_LEAD_POLARITY_TYPE: NORMAL
MDC_IDC_LEAD_SERIAL: NORMAL
MDC_IDC_LEAD_SPECIAL_FUNCTION: NORMAL
MDC_IDC_MSMT_BATTERY_DTM: NORMAL
MDC_IDC_MSMT_BATTERY_REMAINING_LONGEVITY: 37 MO
MDC_IDC_MSMT_BATTERY_RRT_TRIGGER: 2.73
MDC_IDC_MSMT_BATTERY_STATUS: NORMAL
MDC_IDC_MSMT_BATTERY_VOLTAGE: 2.96 V
MDC_IDC_MSMT_CAP_CHARGE_DTM: NORMAL
MDC_IDC_MSMT_CAP_CHARGE_ENERGY: 18 J
MDC_IDC_MSMT_CAP_CHARGE_TIME: 4.01
MDC_IDC_MSMT_CAP_CHARGE_TYPE: NORMAL
MDC_IDC_MSMT_LEADCHNL_RV_IMPEDANCE_VALUE: 323 OHM
MDC_IDC_MSMT_LEADCHNL_RV_IMPEDANCE_VALUE: 323 OHM
MDC_IDC_MSMT_LEADCHNL_RV_PACING_THRESHOLD_AMPLITUDE: 2.5 V
MDC_IDC_MSMT_LEADCHNL_RV_PACING_THRESHOLD_PULSEWIDTH: 0.4 MS
MDC_IDC_MSMT_LEADCHNL_RV_SENSING_INTR_AMPL: 14.38 MV
MDC_IDC_MSMT_LEADCHNL_RV_SENSING_INTR_AMPL: 14.38 MV
MDC_IDC_PG_IMPLANT_DTM: NORMAL
MDC_IDC_PG_MFG: NORMAL
MDC_IDC_PG_MODEL: NORMAL
MDC_IDC_PG_SERIAL: NORMAL
MDC_IDC_PG_TYPE: NORMAL
MDC_IDC_SESS_CLINIC_NAME: NORMAL
MDC_IDC_SESS_DTM: NORMAL
MDC_IDC_SESS_TYPE: NORMAL
MDC_IDC_SET_BRADY_HYSTRATE: NORMAL
MDC_IDC_SET_BRADY_LOWRATE: 40 {BEATS}/MIN
MDC_IDC_SET_BRADY_MODE: NORMAL
MDC_IDC_SET_LEADCHNL_RV_PACING_AMPLITUDE: 5 V
MDC_IDC_SET_LEADCHNL_RV_PACING_ANODE_ELECTRODE_1: NORMAL
MDC_IDC_SET_LEADCHNL_RV_PACING_ANODE_LOCATION_1: NORMAL
MDC_IDC_SET_LEADCHNL_RV_PACING_CAPTURE_MODE: NORMAL
MDC_IDC_SET_LEADCHNL_RV_PACING_CATHODE_ELECTRODE_1: NORMAL
MDC_IDC_SET_LEADCHNL_RV_PACING_CATHODE_LOCATION_1: NORMAL
MDC_IDC_SET_LEADCHNL_RV_PACING_POLARITY: NORMAL
MDC_IDC_SET_LEADCHNL_RV_PACING_PULSEWIDTH: 1 MS
MDC_IDC_SET_LEADCHNL_RV_SENSING_ANODE_ELECTRODE_1: NORMAL
MDC_IDC_SET_LEADCHNL_RV_SENSING_ANODE_LOCATION_1: NORMAL
MDC_IDC_SET_LEADCHNL_RV_SENSING_CATHODE_ELECTRODE_1: NORMAL
MDC_IDC_SET_LEADCHNL_RV_SENSING_CATHODE_LOCATION_1: NORMAL
MDC_IDC_SET_LEADCHNL_RV_SENSING_POLARITY: NORMAL
MDC_IDC_SET_LEADCHNL_RV_SENSING_SENSITIVITY: 0.3 MV
MDC_IDC_SET_ZONE_DETECTION_BEATS_DENOMINATOR: 16 {BEATS}
MDC_IDC_SET_ZONE_DETECTION_BEATS_DENOMINATOR: 40 {BEATS}
MDC_IDC_SET_ZONE_DETECTION_BEATS_DENOMINATOR: 40 {BEATS}
MDC_IDC_SET_ZONE_DETECTION_BEATS_NUMERATOR: 16 {BEATS}
MDC_IDC_SET_ZONE_DETECTION_BEATS_NUMERATOR: 30 {BEATS}
MDC_IDC_SET_ZONE_DETECTION_BEATS_NUMERATOR: 40 {BEATS}
MDC_IDC_SET_ZONE_DETECTION_INTERVAL: 320 MS
MDC_IDC_SET_ZONE_DETECTION_INTERVAL: 360 MS
MDC_IDC_SET_ZONE_DETECTION_INTERVAL: 400 MS
MDC_IDC_SET_ZONE_DETECTION_INTERVAL: NORMAL
MDC_IDC_SET_ZONE_STATUS: NORMAL
MDC_IDC_SET_ZONE_TYPE: NORMAL
MDC_IDC_SET_ZONE_VENDOR_TYPE: NORMAL
MDC_IDC_STAT_BRADY_DTM_END: NORMAL
MDC_IDC_STAT_BRADY_DTM_START: NORMAL
MDC_IDC_STAT_BRADY_RV_PERCENT_PACED: 0.02 %
MDC_IDC_STAT_EPISODE_RECENT_COUNT: 0
MDC_IDC_STAT_EPISODE_RECENT_COUNT_DTM_END: NORMAL
MDC_IDC_STAT_EPISODE_RECENT_COUNT_DTM_START: NORMAL
MDC_IDC_STAT_EPISODE_TOTAL_COUNT: 0
MDC_IDC_STAT_EPISODE_TOTAL_COUNT: 1
MDC_IDC_STAT_EPISODE_TOTAL_COUNT: 16
MDC_IDC_STAT_EPISODE_TOTAL_COUNT_DTM_END: NORMAL
MDC_IDC_STAT_EPISODE_TOTAL_COUNT_DTM_START: NORMAL
MDC_IDC_STAT_EPISODE_TYPE: NORMAL
MDC_IDC_STAT_TACHYTHERAPY_ATP_DELIVERED_RECENT: 0
MDC_IDC_STAT_TACHYTHERAPY_ATP_DELIVERED_TOTAL: 0
MDC_IDC_STAT_TACHYTHERAPY_RECENT_DTM_END: NORMAL
MDC_IDC_STAT_TACHYTHERAPY_RECENT_DTM_START: NORMAL
MDC_IDC_STAT_TACHYTHERAPY_SHOCKS_ABORTED_RECENT: 0
MDC_IDC_STAT_TACHYTHERAPY_SHOCKS_ABORTED_TOTAL: 0
MDC_IDC_STAT_TACHYTHERAPY_SHOCKS_DELIVERED_RECENT: 0
MDC_IDC_STAT_TACHYTHERAPY_SHOCKS_DELIVERED_TOTAL: 1
MDC_IDC_STAT_TACHYTHERAPY_TOTAL_DTM_END: NORMAL
MDC_IDC_STAT_TACHYTHERAPY_TOTAL_DTM_START: NORMAL

## 2024-01-12 DIAGNOSIS — I50.22 CHRONIC SYSTOLIC CONGESTIVE HEART FAILURE (H): ICD-10-CM

## 2024-01-12 RX ORDER — FUROSEMIDE 40 MG
40 TABLET ORAL DAILY
Qty: 90 TABLET | Refills: 3 | Status: SHIPPED | OUTPATIENT
Start: 2024-01-12 | End: 2024-08-27

## 2024-04-04 ENCOUNTER — ANCILLARY PROCEDURE (OUTPATIENT)
Dept: CARDIOLOGY | Facility: CLINIC | Age: 74
End: 2024-04-04
Attending: INTERNAL MEDICINE
Payer: MEDICARE

## 2024-04-04 DIAGNOSIS — Z95.810 ICD (IMPLANTABLE CARDIOVERTER-DEFIBRILLATOR) IN PLACE: ICD-10-CM

## 2024-04-04 DIAGNOSIS — I25.5 ISCHEMIC CARDIOMYOPATHY: ICD-10-CM

## 2024-04-04 PROCEDURE — 93295 DEV INTERROG REMOTE 1/2/MLT: CPT | Performed by: INTERNAL MEDICINE

## 2024-04-04 PROCEDURE — 93296 REM INTERROG EVL PM/IDS: CPT | Performed by: INTERNAL MEDICINE

## 2024-04-08 LAB
MDC_IDC_LEAD_CONNECTION_STATUS: NORMAL
MDC_IDC_LEAD_IMPLANT_DT: NORMAL
MDC_IDC_LEAD_LOCATION: NORMAL
MDC_IDC_LEAD_MFG: NORMAL
MDC_IDC_LEAD_MODEL: NORMAL
MDC_IDC_LEAD_POLARITY_TYPE: NORMAL
MDC_IDC_LEAD_SERIAL: NORMAL
MDC_IDC_LEAD_SPECIAL_FUNCTION: NORMAL
MDC_IDC_MSMT_BATTERY_DTM: NORMAL
MDC_IDC_MSMT_BATTERY_REMAINING_LONGEVITY: 34 MO
MDC_IDC_MSMT_BATTERY_RRT_TRIGGER: 2.73
MDC_IDC_MSMT_BATTERY_STATUS: NORMAL
MDC_IDC_MSMT_BATTERY_VOLTAGE: 2.95 V
MDC_IDC_MSMT_CAP_CHARGE_DTM: NORMAL
MDC_IDC_MSMT_CAP_CHARGE_ENERGY: 18 J
MDC_IDC_MSMT_CAP_CHARGE_TIME: 4.04
MDC_IDC_MSMT_CAP_CHARGE_TYPE: NORMAL
MDC_IDC_MSMT_LEADCHNL_RV_IMPEDANCE_VALUE: 323 OHM
MDC_IDC_MSMT_LEADCHNL_RV_IMPEDANCE_VALUE: 342 OHM
MDC_IDC_MSMT_LEADCHNL_RV_PACING_THRESHOLD_AMPLITUDE: 2.5 V
MDC_IDC_MSMT_LEADCHNL_RV_PACING_THRESHOLD_PULSEWIDTH: 0.4 MS
MDC_IDC_MSMT_LEADCHNL_RV_SENSING_INTR_AMPL: 15.88 MV
MDC_IDC_MSMT_LEADCHNL_RV_SENSING_INTR_AMPL: 15.88 MV
MDC_IDC_PG_IMPLANT_DTM: NORMAL
MDC_IDC_PG_MFG: NORMAL
MDC_IDC_PG_MODEL: NORMAL
MDC_IDC_PG_SERIAL: NORMAL
MDC_IDC_PG_TYPE: NORMAL
MDC_IDC_SESS_CLINIC_NAME: NORMAL
MDC_IDC_SESS_DTM: NORMAL
MDC_IDC_SESS_TYPE: NORMAL
MDC_IDC_SET_BRADY_HYSTRATE: NORMAL
MDC_IDC_SET_BRADY_LOWRATE: 40 {BEATS}/MIN
MDC_IDC_SET_BRADY_MODE: NORMAL
MDC_IDC_SET_LEADCHNL_RV_PACING_AMPLITUDE: 5 V
MDC_IDC_SET_LEADCHNL_RV_PACING_ANODE_ELECTRODE_1: NORMAL
MDC_IDC_SET_LEADCHNL_RV_PACING_ANODE_LOCATION_1: NORMAL
MDC_IDC_SET_LEADCHNL_RV_PACING_CAPTURE_MODE: NORMAL
MDC_IDC_SET_LEADCHNL_RV_PACING_CATHODE_ELECTRODE_1: NORMAL
MDC_IDC_SET_LEADCHNL_RV_PACING_CATHODE_LOCATION_1: NORMAL
MDC_IDC_SET_LEADCHNL_RV_PACING_POLARITY: NORMAL
MDC_IDC_SET_LEADCHNL_RV_PACING_PULSEWIDTH: 1 MS
MDC_IDC_SET_LEADCHNL_RV_SENSING_ANODE_ELECTRODE_1: NORMAL
MDC_IDC_SET_LEADCHNL_RV_SENSING_ANODE_LOCATION_1: NORMAL
MDC_IDC_SET_LEADCHNL_RV_SENSING_CATHODE_ELECTRODE_1: NORMAL
MDC_IDC_SET_LEADCHNL_RV_SENSING_CATHODE_LOCATION_1: NORMAL
MDC_IDC_SET_LEADCHNL_RV_SENSING_POLARITY: NORMAL
MDC_IDC_SET_LEADCHNL_RV_SENSING_SENSITIVITY: 0.3 MV
MDC_IDC_SET_ZONE_DETECTION_BEATS_DENOMINATOR: 16 {BEATS}
MDC_IDC_SET_ZONE_DETECTION_BEATS_DENOMINATOR: 40 {BEATS}
MDC_IDC_SET_ZONE_DETECTION_BEATS_DENOMINATOR: 40 {BEATS}
MDC_IDC_SET_ZONE_DETECTION_BEATS_NUMERATOR: 16 {BEATS}
MDC_IDC_SET_ZONE_DETECTION_BEATS_NUMERATOR: 30 {BEATS}
MDC_IDC_SET_ZONE_DETECTION_BEATS_NUMERATOR: 40 {BEATS}
MDC_IDC_SET_ZONE_DETECTION_INTERVAL: 320 MS
MDC_IDC_SET_ZONE_DETECTION_INTERVAL: 360 MS
MDC_IDC_SET_ZONE_DETECTION_INTERVAL: 400 MS
MDC_IDC_SET_ZONE_DETECTION_INTERVAL: NORMAL
MDC_IDC_SET_ZONE_STATUS: NORMAL
MDC_IDC_SET_ZONE_TYPE: NORMAL
MDC_IDC_SET_ZONE_VENDOR_TYPE: NORMAL
MDC_IDC_STAT_BRADY_DTM_END: NORMAL
MDC_IDC_STAT_BRADY_DTM_START: NORMAL
MDC_IDC_STAT_BRADY_RV_PERCENT_PACED: 0.01 %
MDC_IDC_STAT_EPISODE_RECENT_COUNT: 0
MDC_IDC_STAT_EPISODE_RECENT_COUNT_DTM_END: NORMAL
MDC_IDC_STAT_EPISODE_RECENT_COUNT_DTM_START: NORMAL
MDC_IDC_STAT_EPISODE_TOTAL_COUNT: 0
MDC_IDC_STAT_EPISODE_TOTAL_COUNT: 1
MDC_IDC_STAT_EPISODE_TOTAL_COUNT: 16
MDC_IDC_STAT_EPISODE_TOTAL_COUNT_DTM_END: NORMAL
MDC_IDC_STAT_EPISODE_TOTAL_COUNT_DTM_START: NORMAL
MDC_IDC_STAT_EPISODE_TYPE: NORMAL
MDC_IDC_STAT_TACHYTHERAPY_ATP_DELIVERED_RECENT: 0
MDC_IDC_STAT_TACHYTHERAPY_ATP_DELIVERED_TOTAL: 0
MDC_IDC_STAT_TACHYTHERAPY_RECENT_DTM_END: NORMAL
MDC_IDC_STAT_TACHYTHERAPY_RECENT_DTM_START: NORMAL
MDC_IDC_STAT_TACHYTHERAPY_SHOCKS_ABORTED_RECENT: 0
MDC_IDC_STAT_TACHYTHERAPY_SHOCKS_ABORTED_TOTAL: 0
MDC_IDC_STAT_TACHYTHERAPY_SHOCKS_DELIVERED_RECENT: 0
MDC_IDC_STAT_TACHYTHERAPY_SHOCKS_DELIVERED_TOTAL: 1
MDC_IDC_STAT_TACHYTHERAPY_TOTAL_DTM_END: NORMAL
MDC_IDC_STAT_TACHYTHERAPY_TOTAL_DTM_START: NORMAL

## 2024-07-08 ENCOUNTER — OFFICE VISIT (OUTPATIENT)
Dept: FAMILY MEDICINE | Facility: CLINIC | Age: 74
End: 2024-07-08
Payer: MEDICARE

## 2024-07-08 VITALS
HEART RATE: 74 BPM | SYSTOLIC BLOOD PRESSURE: 121 MMHG | BODY MASS INDEX: 28.08 KG/M2 | RESPIRATION RATE: 16 BRPM | HEIGHT: 67 IN | DIASTOLIC BLOOD PRESSURE: 65 MMHG | TEMPERATURE: 97.5 F | WEIGHT: 178.9 LBS | OXYGEN SATURATION: 95 %

## 2024-07-08 DIAGNOSIS — I50.9 CHRONIC CONGESTIVE HEART FAILURE, UNSPECIFIED HEART FAILURE TYPE (H): ICD-10-CM

## 2024-07-08 DIAGNOSIS — Z00.00 ENCOUNTER FOR MEDICARE ANNUAL WELLNESS EXAM: Primary | ICD-10-CM

## 2024-07-08 DIAGNOSIS — I34.0 NONRHEUMATIC MITRAL VALVE REGURGITATION: ICD-10-CM

## 2024-07-08 LAB
BASOPHILS # BLD AUTO: 0 10E3/UL (ref 0–0.2)
BASOPHILS NFR BLD AUTO: 0 %
EOSINOPHIL # BLD AUTO: 0.3 10E3/UL (ref 0–0.7)
EOSINOPHIL NFR BLD AUTO: 3 %
ERYTHROCYTE [DISTWIDTH] IN BLOOD BY AUTOMATED COUNT: 11.5 % (ref 10–15)
HCT VFR BLD AUTO: 43.6 % (ref 40–53)
HGB BLD-MCNC: 15.5 G/DL (ref 13.3–17.7)
IMM GRANULOCYTES # BLD: 0 10E3/UL
IMM GRANULOCYTES NFR BLD: 0 %
LYMPHOCYTES # BLD AUTO: 2 10E3/UL (ref 0.8–5.3)
LYMPHOCYTES NFR BLD AUTO: 17 %
MCH RBC QN AUTO: 37.8 PG (ref 26.5–33)
MCHC RBC AUTO-ENTMCNC: 35.6 G/DL (ref 31.5–36.5)
MCV RBC AUTO: 106 FL (ref 78–100)
MONOCYTES # BLD AUTO: 1 10E3/UL (ref 0–1.3)
MONOCYTES NFR BLD AUTO: 9 %
NEUTROPHILS # BLD AUTO: 8.5 10E3/UL (ref 1.6–8.3)
NEUTROPHILS NFR BLD AUTO: 72 %
PLATELET # BLD AUTO: 134 10E3/UL (ref 150–450)
RBC # BLD AUTO: 4.1 10E6/UL (ref 4.4–5.9)
WBC # BLD AUTO: 11.8 10E3/UL (ref 4–11)

## 2024-07-08 PROCEDURE — 80061 LIPID PANEL: CPT | Performed by: INTERNAL MEDICINE

## 2024-07-08 PROCEDURE — 84443 ASSAY THYROID STIM HORMONE: CPT | Performed by: INTERNAL MEDICINE

## 2024-07-08 PROCEDURE — 99213 OFFICE O/P EST LOW 20 MIN: CPT | Mod: 25 | Performed by: INTERNAL MEDICINE

## 2024-07-08 PROCEDURE — 85025 COMPLETE CBC W/AUTO DIFF WBC: CPT | Performed by: INTERNAL MEDICINE

## 2024-07-08 PROCEDURE — G0439 PPPS, SUBSEQ VISIT: HCPCS | Performed by: INTERNAL MEDICINE

## 2024-07-08 PROCEDURE — 36415 COLL VENOUS BLD VENIPUNCTURE: CPT | Performed by: INTERNAL MEDICINE

## 2024-07-08 PROCEDURE — 80053 COMPREHEN METABOLIC PANEL: CPT | Performed by: INTERNAL MEDICINE

## 2024-07-08 SDOH — HEALTH STABILITY: PHYSICAL HEALTH: ON AVERAGE, HOW MANY MINUTES DO YOU ENGAGE IN EXERCISE AT THIS LEVEL?: 50 MIN

## 2024-07-08 SDOH — HEALTH STABILITY: PHYSICAL HEALTH: ON AVERAGE, HOW MANY DAYS PER WEEK DO YOU ENGAGE IN MODERATE TO STRENUOUS EXERCISE (LIKE A BRISK WALK)?: 2 DAYS

## 2024-07-08 ASSESSMENT — SOCIAL DETERMINANTS OF HEALTH (SDOH): HOW OFTEN DO YOU GET TOGETHER WITH FRIENDS OR RELATIVES?: ONCE A WEEK

## 2024-07-08 ASSESSMENT — PAIN SCALES - GENERAL: PAINLEVEL: NO PAIN (0)

## 2024-07-08 NOTE — LETTER
"July 9, 2024      Tae ISAAC Kindra  [address]  [facility] [address]        Tae Horton!     Your results are in.  Everything looks good.  There are some values that may be slightly out of the \"normal\" range, but are not worrisome.     Let me know if you have any questions.  If there's a particular blood test result you're concerned about please schedule a follow up appointment at your convenience.     Resulted Orders   Comprehensive metabolic panel   Result Value Ref Range    Sodium 138 135 - 145 mmol/L    Potassium 5.0 3.4 - 5.3 mmol/L    Carbon Dioxide (CO2) 27 22 - 29 mmol/L    Anion Gap 11 7 - 15 mmol/L    Urea Nitrogen 19.0 8.0 - 23.0 mg/dL    Creatinine 0.96 0.67 - 1.17 mg/dL    GFR Estimate 82 >60 mL/min/1.73m2      Comment:      eGFR calculated using 2021 CKD-EPI equation.    Calcium 10.0 8.8 - 10.2 mg/dL    Chloride 100 98 - 107 mmol/L    Glucose 119 (H) 70 - 99 mg/dL    Alkaline Phosphatase 90 40 - 150 U/L    AST 29 0 - 45 U/L      Comment:      Reference intervals for this test were updated on 6/12/2023 to more accurately reflect our healthy population. There may be differences in the flagging of prior results with similar values performed with this method. Interpretation of those prior results can be made in the context of the updated reference intervals.    ALT 23 0 - 70 U/L      Comment:      Reference intervals for this test were updated on 6/12/2023 to more accurately reflect our healthy population. There may be differences in the flagging of prior results with similar values performed with this method. Interpretation of those prior results can be made in the context of the updated reference intervals.      Protein Total 7.9 6.4 - 8.3 g/dL    Albumin 4.9 3.5 - 5.2 g/dL    Bilirubin Total 0.7 <=1.2 mg/dL    Patient Fasting > 8hrs? Yes    TSH with free T4 reflex   Result Value Ref Range    TSH 2.00 0.30 - 4.20 uIU/mL   Lipid panel reflex to direct LDL Fasting   Result Value Ref Range    " Cholesterol 135 <200 mg/dL    Triglycerides 98 <150 mg/dL    Direct Measure HDL 58 >=40 mg/dL    LDL Cholesterol Calculated 57 <=100 mg/dL    Non HDL Cholesterol 77 <130 mg/dL    Patient Fasting > 8hrs? Yes     Narrative    Cholesterol  Desirable:  <200 mg/dL    Triglycerides  Normal:  Less than 150 mg/dL  Borderline High:  150-199 mg/dL  High:  200-499 mg/dL  Very High:  Greater than or equal to 500 mg/dL    Direct Measure HDL  Female:  Greater than or equal to 50 mg/dL   Male:  Greater than or equal to 40 mg/dL    LDL Cholesterol  Desirable:  <100mg/dL  Above Desirable:  100-129 mg/dL   Borderline High:  130-159 mg/dL   High:  160-189 mg/dL   Very High:  >= 190 mg/dL    Non HDL Cholesterol  Desirable:  130 mg/dL  Above Desirable:  130-159 mg/dL  Borderline High:  160-189 mg/dL  High:  190-219 mg/dL  Very High:  Greater than or equal to 220 mg/dL   CBC with platelets and differential   Result Value Ref Range    WBC Count 11.8 (H) 4.0 - 11.0 10e3/uL    RBC Count 4.10 (L) 4.40 - 5.90 10e6/uL    Hemoglobin 15.5 13.3 - 17.7 g/dL    Hematocrit 43.6 40.0 - 53.0 %     (H) 78 - 100 fL    MCH 37.8 (H) 26.5 - 33.0 pg    MCHC 35.6 31.5 - 36.5 g/dL    RDW 11.5 10.0 - 15.0 %    Platelet Count 134 (L) 150 - 450 10e3/uL    % Neutrophils 72 %    % Lymphocytes 17 %    % Monocytes 9 %    % Eosinophils 3 %    % Basophils 0 %    % Immature Granulocytes 0 %    Absolute Neutrophils 8.5 (H) 1.6 - 8.3 10e3/uL    Absolute Lymphocytes 2.0 0.8 - 5.3 10e3/uL    Absolute Monocytes 1.0 0.0 - 1.3 10e3/uL    Absolute Eosinophils 0.3 0.0 - 0.7 10e3/uL    Absolute Basophils 0.0 0.0 - 0.2 10e3/uL    Absolute Immature Granulocytes 0.0 <=0.4 10e3/uL       If you have any questions or concerns, please call the clinic at the number listed above.       Sincerely,      Rory Gambino MD

## 2024-07-08 NOTE — PROGRESS NOTES
"Preventive Care Visit  [facility]  Rory Gambino MD, Internal Medicine  Jul 8, 2024      Assessment & Plan     Encounter for Medicare annual wellness exam  Age and gender appropriate preventive care and screenings are discussed.  Particular attention to personal preventive care and age appropriate lifestyle including the incorporation of healthy diet and physical activity is made.        Chronic congestive heart failure, unspecified heart failure type (H)  Medications noted.  Table.  No changes.  Followup with Cardiology. Labs today.    - CBC with Platelets & Differential  - Comprehensive metabolic panel  - TSH with free T4 reflex  - Lipid panel reflex to direct LDL Fasting    Nonrheumatic mitral valve regurgitation  Last note reviewed. Asymptomatic at present.   - CBC with Platelets & Differential  - Comprehensive metabolic panel  - TSH with free T4 reflex  - Lipid panel reflex to direct LDL Fasting              BMI  Estimated body mass index is 28.02 kg/m  as calculated from the following:    Height as of this encounter: 1.702 m (5' 7\").    Weight as of this encounter: 81.1 kg (178 lb 14.4 oz).       Counseling  Appropriate preventive services were discussed with this patient, including applicable screening as appropriate for fall prevention, nutrition, physical activity, Tobacco-use cessation, weight loss and cognition.  Checklist reviewing preventive services available has been given to the patient.  Reviewed patient's diet, addressing concerns and/or questions.   He is at risk for lack of exercise and has been provided with information to increase physical activity for the benefit of his well-being.   The patient was instructed to see the dentist every 6 months.   He is at risk for psychosocial distress and has been provided with information to reduce risk.   Discussed possible causes of fatigue.         Chiara Strong is a 73 year old, presenting for the " following:  Physical          Health Care Directive  Patient does not have a Health Care Directive or Living Will: Discussed advance care planning with patient; however, patient declined at this time.    HPI    Overall no changes.  Sees cardiology.     Dealing with more ALEX weaver SAVANNA.  Managing with Jonathan Junior.              7/8/2024   General Health   How would you rate your overall physical health? Good   Feel stress (tense, anxious, or unable to sleep) Only a little      (!) STRESS CONCERN      7/8/2024   Nutrition   Diet: Regular (no restrictions)            7/8/2024   Exercise   Days per week of moderate/strenous exercise 2 days   Average minutes spent exercising at this level 50 min      (!) EXERCISE CONCERN      7/8/2024   Social Factors   Frequency of gathering with friends or relatives Once a week   Worry food won't last until get money to buy more No   Food not last or not have enough money for food? No   Do you have housing? (Housing is defined as stable permanent housing and does not include staying ouside in a car, in a tent, in an abandoned building, in an overnight shelter, or couch-surfing.) Yes   Are you worried about losing your housing? No   Lack of transportation? No   Unable to get utilities (heat,electricity)? No            7/8/2024   Fall Risk   Fallen 2 or more times in the past year? No    No   Trouble with walking or balance? No    No       Multiple values from one day are sorted in reverse-chronological order          7/8/2024   Activities of Daily Living- Home Safety   Needs help with the following daily activites None of the above   Safety concerns in the home None of the above            7/8/2024   Dental   Dentist two times every year? (!) NO            7/8/2024   Hearing Screening   Hearing concerns? None of the above            7/8/2024   Driving Risk Screening   Patient/family members have concerns about driving No            7/8/2024   General Alertness/Fatigue Screening   Have you been  more tired than usual lately? (!) YES            2024   Urinary Incontinence Screening   Bothered by leaking urine in past 6 months No            2024   TB Screening   Were you born outside of the [place]? No            Today's PHQ-2 Score:       2024     1:38 PM   PHQ-2 (  [employer])   Q1: Little interest or pleasure in doing things 0   Q2: Feeling down, depressed or hopeless 0   PHQ-2 Score 0   Q1: Little interest or pleasure in doing things Not at all   Q2: Feeling down, depressed or hopeless Not at all   PHQ-2 Score 0           2024   Substance Use   Alcohol more than 3/day or more than 7/wk No   Do you have a current opioid prescription? No   How severe/bad is pain from 1 to 10? 0/10 (No Pain)   Do you use any other substances recreationally? No        Social History     Tobacco Use    Smoking status: Former     Current packs/day: 0.00     Types: Cigarettes     Quit date: 10/23/1997     Years since quittin.7    Smokeless tobacco: Never   Substance Use Topics    Alcohol use: Yes     Alcohol/week: 0.0 standard drinks of alcohol     Comment: 7 beers a week    Drug use: No       ASCVD Risk   The 10-year ASCVD risk score (Bre GUERRA, et al., 2019) is: 23.4%    Values used to calculate the score:      Age: 73 years      Sex: Male      Is Non- : No      Diabetic: No      Tobacco smoker: No      Systolic Blood Pressure: 121 mmHg      Is BP treated: Yes      HDL Cholesterol: 53 mg/dL      Total Cholesterol: 149 mg/dL            Reviewed and updated as needed this visit by Provider                      Current providers sharing in care for this patient include:  Patient Care Team:  Rory Gambino MD as PCP - General (Internal Medicine)  Rory Gambino MD as Assigned PCP  Sandoval Marroquin MD as MD (Cardiovascular Disease)  Jono Santiago MD as Assigned Heart and Vascular Provider    The following health maintenance items are reviewed in Epic and correct as of  "today:  Health Maintenance   Topic Date Due    HF ACTION PLAN  03/26/2014    DTAP/TDAP/TD IMMUNIZATION (2 - Td or Tdap) 03/26/2023    ANNUAL REVIEW OF HM ORDERS  05/20/2023    COVID-19 Vaccine (8 - 2023-24 season) 04/07/2024    LIPID  06/12/2024    MEDICARE ANNUAL WELLNESS VISIT  07/05/2024    INFLUENZA VACCINE (1) 09/01/2024    COLORECTAL CANCER SCREENING  02/12/2025    FALL RISK ASSESSMENT  07/08/2025    GLUCOSE  06/08/2026    ADVANCE CARE PLANNING  07/08/2029    TSH W/FREE T4 REFLEX  Completed    HEPATITIS C SCREENING  Completed    PHQ-2 (once per calendar year)  Completed    Pneumococcal Vaccine: 65+ Years  Completed    ZOSTER IMMUNIZATION  Completed    RSV VACCINE (Pregnancy & 60+)  Completed    AORTIC ANEURYSM SCREENING (SYSTEM ASSIGNED)  Completed    IPV IMMUNIZATION  Aged Out    HPV IMMUNIZATION  Aged Out    MENINGITIS IMMUNIZATION  Aged Out    RSV MONOCLONAL ANTIBODY  Aged Out    ALT  Discontinued    BMP  Discontinued    CBC  Discontinued            Objective    Exam  /65 (BP Location: Left arm, Patient Position: Sitting, Cuff Size: Adult Regular)   Pulse 74   Temp 97.5  F (36.4  C) (Temporal)   Resp 16   Ht 1.702 m (5' 7\")   Wt 81.1 kg (178 lb 14.4 oz)   SpO2 95%   BMI 28.02 kg/m     Estimated body mass index is 28.02 kg/m  as calculated from the following:    Height as of this encounter: 1.702 m (5' 7\").    Weight as of this encounter: 81.1 kg (178 lb 14.4 oz).    Physical Exam  GENERAL: alert and no distress  EYES: Eyes grossly normal to inspection, PERRL and conjunctivae and sclerae normal  HENT: ear canals and TM's normal, nose and mouth without ulcers or lesions  NECK: no adenopathy, no asymmetry, masses, or scars  RESP: lungs clear to auscultation - no rales, rhonchi or wheezes  CV: regular rate and rhythm, normal S1 S2, no S3 or S4, no murmur, click or rub, no peripheral edema  ABDOMEN: soft, nontender, no hepatosplenomegaly, no masses and bowel sounds normal  MS: no gross " musculoskeletal defects noted, no edema  SKIN: no suspicious lesions or rashes  NEURO: Normal strength and tone, mentation intact and speech normal  PSYCH: mentation appears normal, affect normal/bright         7/8/2024   Mini Cog   Clock Draw Score 2 Normal   3 Item Recall 2 objects recalled   Mini Cog Total Score 4                 Signed Electronically by: Rory Gambino MD

## 2024-07-09 LAB
ALBUMIN SERPL BCG-MCNC: 4.9 G/DL (ref 3.5–5.2)
ALP SERPL-CCNC: 90 U/L (ref 40–150)
ALT SERPL W P-5'-P-CCNC: 23 U/L (ref 0–70)
ANION GAP SERPL CALCULATED.3IONS-SCNC: 11 MMOL/L (ref 7–15)
AST SERPL W P-5'-P-CCNC: 29 U/L (ref 0–45)
BILIRUB SERPL-MCNC: 0.7 MG/DL
BUN SERPL-MCNC: 19 MG/DL (ref 8–23)
CALCIUM SERPL-MCNC: 10 MG/DL (ref 8.8–10.2)
CHLORIDE SERPL-SCNC: 100 MMOL/L (ref 98–107)
CHOLEST SERPL-MCNC: 135 MG/DL
CREAT SERPL-MCNC: 0.96 MG/DL (ref 0.67–1.17)
DEPRECATED HCO3 PLAS-SCNC: 27 MMOL/L (ref 22–29)
EGFRCR SERPLBLD CKD-EPI 2021: 82 ML/MIN/1.73M2
FASTING STATUS PATIENT QL REPORTED: YES
FASTING STATUS PATIENT QL REPORTED: YES
GLUCOSE SERPL-MCNC: 119 MG/DL (ref 70–99)
HDLC SERPL-MCNC: 58 MG/DL
LDLC SERPL CALC-MCNC: 57 MG/DL
NONHDLC SERPL-MCNC: 77 MG/DL
POTASSIUM SERPL-SCNC: 5 MMOL/L (ref 3.4–5.3)
PROT SERPL-MCNC: 7.9 G/DL (ref 6.4–8.3)
SODIUM SERPL-SCNC: 138 MMOL/L (ref 135–145)
TRIGL SERPL-MCNC: 98 MG/DL
TSH SERPL DL<=0.005 MIU/L-ACNC: 2 UIU/ML (ref 0.3–4.2)

## 2024-07-11 ENCOUNTER — ANCILLARY PROCEDURE (OUTPATIENT)
Dept: CARDIOLOGY | Facility: CLINIC | Age: 74
End: 2024-07-11
Attending: INTERNAL MEDICINE
Payer: MEDICARE

## 2024-07-11 DIAGNOSIS — I25.5 ISCHEMIC CARDIOMYOPATHY: ICD-10-CM

## 2024-07-11 DIAGNOSIS — Z95.810 ICD (IMPLANTABLE CARDIOVERTER-DEFIBRILLATOR) IN PLACE: ICD-10-CM

## 2024-07-11 LAB
MDC_IDC_LEAD_CONNECTION_STATUS: NORMAL
MDC_IDC_LEAD_IMPLANT_DT: NORMAL
MDC_IDC_LEAD_LOCATION: NORMAL
MDC_IDC_LEAD_MFG: NORMAL
MDC_IDC_LEAD_MODEL: NORMAL
MDC_IDC_LEAD_POLARITY_TYPE: NORMAL
MDC_IDC_LEAD_SERIAL: NORMAL
MDC_IDC_LEAD_SPECIAL_FUNCTION: NORMAL
MDC_IDC_MSMT_BATTERY_DTM: NORMAL
MDC_IDC_MSMT_BATTERY_REMAINING_LONGEVITY: 33 MO
MDC_IDC_MSMT_BATTERY_RRT_TRIGGER: 2.73
MDC_IDC_MSMT_BATTERY_STATUS: NORMAL
MDC_IDC_MSMT_BATTERY_VOLTAGE: 2.89 V
MDC_IDC_MSMT_LEADCHNL_RV_IMPEDANCE_VALUE: 342 OHM
MDC_IDC_MSMT_LEADCHNL_RV_IMPEDANCE_VALUE: 380 OHM
MDC_IDC_MSMT_LEADCHNL_RV_PACING_THRESHOLD_AMPLITUDE: 2.5 V
MDC_IDC_MSMT_LEADCHNL_RV_PACING_THRESHOLD_PULSEWIDTH: 0.4 MS
MDC_IDC_MSMT_LEADCHNL_RV_SENSING_INTR_AMPL: 15.62 MV
MDC_IDC_MSMT_LEADCHNL_RV_SENSING_INTR_AMPL: 17.12 MV
MDC_IDC_PG_IMPLANT_DTM: NORMAL
MDC_IDC_PG_MFG: NORMAL
MDC_IDC_PG_MODEL: NORMAL
MDC_IDC_PG_SERIAL: NORMAL
MDC_IDC_PG_TYPE: NORMAL
MDC_IDC_SESS_CLINIC_NAME: NORMAL
MDC_IDC_SESS_DTM: NORMAL
MDC_IDC_SESS_TYPE: NORMAL
MDC_IDC_SET_BRADY_HYSTRATE: NORMAL
MDC_IDC_SET_BRADY_LOWRATE: 40 {BEATS}/MIN
MDC_IDC_SET_BRADY_MODE: NORMAL
MDC_IDC_SET_LEADCHNL_RV_PACING_AMPLITUDE: 5 V
MDC_IDC_SET_LEADCHNL_RV_PACING_ANODE_ELECTRODE_1: NORMAL
MDC_IDC_SET_LEADCHNL_RV_PACING_ANODE_LOCATION_1: NORMAL
MDC_IDC_SET_LEADCHNL_RV_PACING_CAPTURE_MODE: NORMAL
MDC_IDC_SET_LEADCHNL_RV_PACING_CATHODE_ELECTRODE_1: NORMAL
MDC_IDC_SET_LEADCHNL_RV_PACING_CATHODE_LOCATION_1: NORMAL
MDC_IDC_SET_LEADCHNL_RV_PACING_POLARITY: NORMAL
MDC_IDC_SET_LEADCHNL_RV_PACING_PULSEWIDTH: 1 MS
MDC_IDC_SET_LEADCHNL_RV_SENSING_ANODE_ELECTRODE_1: NORMAL
MDC_IDC_SET_LEADCHNL_RV_SENSING_ANODE_LOCATION_1: NORMAL
MDC_IDC_SET_LEADCHNL_RV_SENSING_CATHODE_ELECTRODE_1: NORMAL
MDC_IDC_SET_LEADCHNL_RV_SENSING_CATHODE_LOCATION_1: NORMAL
MDC_IDC_SET_LEADCHNL_RV_SENSING_POLARITY: NORMAL
MDC_IDC_SET_LEADCHNL_RV_SENSING_SENSITIVITY: 0.3 MV
MDC_IDC_SET_ZONE_DETECTION_BEATS_DENOMINATOR: 16 {BEATS}
MDC_IDC_SET_ZONE_DETECTION_BEATS_DENOMINATOR: 40 {BEATS}
MDC_IDC_SET_ZONE_DETECTION_BEATS_DENOMINATOR: 40 {BEATS}
MDC_IDC_SET_ZONE_DETECTION_BEATS_NUMERATOR: 16 {BEATS}
MDC_IDC_SET_ZONE_DETECTION_BEATS_NUMERATOR: 30 {BEATS}
MDC_IDC_SET_ZONE_DETECTION_BEATS_NUMERATOR: 40 {BEATS}
MDC_IDC_SET_ZONE_DETECTION_INTERVAL: 320 MS
MDC_IDC_SET_ZONE_DETECTION_INTERVAL: 360 MS
MDC_IDC_SET_ZONE_DETECTION_INTERVAL: 400 MS
MDC_IDC_SET_ZONE_DETECTION_INTERVAL: NORMAL
MDC_IDC_SET_ZONE_STATUS: NORMAL
MDC_IDC_SET_ZONE_TYPE: NORMAL
MDC_IDC_SET_ZONE_VENDOR_TYPE: NORMAL
MDC_IDC_STAT_BRADY_DTM_END: NORMAL
MDC_IDC_STAT_BRADY_DTM_START: NORMAL
MDC_IDC_STAT_BRADY_RV_PERCENT_PACED: 0.02 %
MDC_IDC_STAT_EPISODE_RECENT_COUNT: 0
MDC_IDC_STAT_EPISODE_RECENT_COUNT: 1
MDC_IDC_STAT_EPISODE_RECENT_COUNT_DTM_END: NORMAL
MDC_IDC_STAT_EPISODE_RECENT_COUNT_DTM_START: NORMAL
MDC_IDC_STAT_EPISODE_TOTAL_COUNT: 0
MDC_IDC_STAT_EPISODE_TOTAL_COUNT: 1
MDC_IDC_STAT_EPISODE_TOTAL_COUNT: 16
MDC_IDC_STAT_EPISODE_TOTAL_COUNT_DTM_END: NORMAL
MDC_IDC_STAT_EPISODE_TOTAL_COUNT_DTM_START: NORMAL
MDC_IDC_STAT_EPISODE_TYPE: NORMAL
MDC_IDC_STAT_TACHYTHERAPY_ATP_DELIVERED_RECENT: 0
MDC_IDC_STAT_TACHYTHERAPY_ATP_DELIVERED_TOTAL: 0
MDC_IDC_STAT_TACHYTHERAPY_RECENT_DTM_END: NORMAL
MDC_IDC_STAT_TACHYTHERAPY_RECENT_DTM_START: NORMAL
MDC_IDC_STAT_TACHYTHERAPY_SHOCKS_ABORTED_RECENT: 0
MDC_IDC_STAT_TACHYTHERAPY_SHOCKS_ABORTED_TOTAL: 0
MDC_IDC_STAT_TACHYTHERAPY_SHOCKS_DELIVERED_RECENT: 0
MDC_IDC_STAT_TACHYTHERAPY_SHOCKS_DELIVERED_TOTAL: 1
MDC_IDC_STAT_TACHYTHERAPY_TOTAL_DTM_END: NORMAL
MDC_IDC_STAT_TACHYTHERAPY_TOTAL_DTM_START: NORMAL

## 2024-07-11 PROCEDURE — 93282 PRGRMG EVAL IMPLANTABLE DFB: CPT | Performed by: INTERNAL MEDICINE

## 2024-08-12 DIAGNOSIS — I25.5 ISCHEMIC CARDIOMYOPATHY: ICD-10-CM

## 2024-08-12 DIAGNOSIS — I50.22 CHRONIC SYSTOLIC CONGESTIVE HEART FAILURE (H): ICD-10-CM

## 2024-08-12 RX ORDER — SPIRONOLACTONE 25 MG/1
25 TABLET ORAL DAILY
Qty: 90 TABLET | Refills: 0 | Status: SHIPPED | OUTPATIENT
Start: 2024-08-12 | End: 2024-08-27

## 2024-08-12 RX ORDER — METOPROLOL SUCCINATE 200 MG/1
200 TABLET, EXTENDED RELEASE ORAL DAILY
Qty: 90 TABLET | Refills: 0 | Status: SHIPPED | OUTPATIENT
Start: 2024-08-12 | End: 2024-08-27

## 2024-08-20 DIAGNOSIS — E78.5 HYPERLIPIDEMIA LDL GOAL <100: ICD-10-CM

## 2024-08-20 RX ORDER — ATORVASTATIN CALCIUM 20 MG/1
20 TABLET, FILM COATED ORAL DAILY
Qty: 90 TABLET | Refills: 0 | Status: SHIPPED | OUTPATIENT
Start: 2024-08-20 | End: 2024-08-27

## 2024-08-22 ENCOUNTER — HOSPITAL ENCOUNTER (OUTPATIENT)
Dept: CARDIOLOGY | Facility: CLINIC | Age: 74
Discharge: HOME | End: 2024-08-22
Attending: INTERNAL MEDICINE | Admitting: INTERNAL MEDICINE
Payer: MEDICARE

## 2024-08-22 DIAGNOSIS — I50.22 CHRONIC SYSTOLIC HEART FAILURE (H): Primary | ICD-10-CM

## 2024-08-22 LAB — LVEF ECHO: NORMAL

## 2024-08-22 PROCEDURE — 999N000208 ECHOCARDIOGRAM COMPLETE

## 2024-08-22 PROCEDURE — 93306 TTE W/DOPPLER COMPLETE: CPT | Mod: 26 | Performed by: INTERNAL MEDICINE

## 2024-08-22 PROCEDURE — 255N000002 HC RX 255 OP 636: Performed by: INTERNAL MEDICINE

## 2024-08-22 RX ADMIN — HUMAN ALBUMIN MICROSPHERES AND PERFLUTREN 9 ML: 10; .22 INJECTION, SOLUTION INTRAVENOUS at 11:59

## 2024-08-26 ENCOUNTER — LAB (OUTPATIENT)
Dept: LAB | Facility: CLINIC | Age: 74
End: 2024-08-26
Payer: MEDICARE

## 2024-08-26 DIAGNOSIS — I10 HTN (HYPERTENSION), BENIGN: ICD-10-CM

## 2024-08-26 DIAGNOSIS — I50.22 CHRONIC SYSTOLIC CONGESTIVE HEART FAILURE (H): ICD-10-CM

## 2024-08-26 DIAGNOSIS — I25.5 ISCHEMIC CARDIOMYOPATHY: ICD-10-CM

## 2024-08-26 LAB
ANION GAP SERPL CALCULATED.3IONS-SCNC: 9 MMOL/L (ref 7–15)
BUN SERPL-MCNC: 19.5 MG/DL (ref 8–23)
CALCIUM SERPL-MCNC: 9.8 MG/DL (ref 8.8–10.4)
CHLORIDE SERPL-SCNC: 98 MMOL/L (ref 98–107)
CREAT SERPL-MCNC: 0.84 MG/DL (ref 0.67–1.17)
EGFRCR SERPLBLD CKD-EPI 2021: >90 ML/MIN/1.73M2
GLUCOSE SERPL-MCNC: 101 MG/DL (ref 70–99)
HCO3 SERPL-SCNC: 26 MMOL/L (ref 22–29)
POTASSIUM SERPL-SCNC: 4.6 MMOL/L (ref 3.4–5.3)
SODIUM SERPL-SCNC: 133 MMOL/L (ref 135–145)

## 2024-08-26 PROCEDURE — 80048 BASIC METABOLIC PNL TOTAL CA: CPT

## 2024-08-26 PROCEDURE — 36415 COLL VENOUS BLD VENIPUNCTURE: CPT

## 2024-08-27 ENCOUNTER — OFFICE VISIT (OUTPATIENT)
Dept: CARDIOLOGY | Facility: CLINIC | Age: 74
End: 2024-08-27
Attending: INTERNAL MEDICINE
Payer: MEDICARE

## 2024-08-27 VITALS
OXYGEN SATURATION: 98 % | BODY MASS INDEX: 27.99 KG/M2 | HEIGHT: 67 IN | SYSTOLIC BLOOD PRESSURE: 131 MMHG | WEIGHT: 178.3 LBS | HEART RATE: 69 BPM | DIASTOLIC BLOOD PRESSURE: 66 MMHG

## 2024-08-27 DIAGNOSIS — I25.5 ISCHEMIC CARDIOMYOPATHY: ICD-10-CM

## 2024-08-27 DIAGNOSIS — E78.5 HYPERLIPIDEMIA LDL GOAL <100: ICD-10-CM

## 2024-08-27 DIAGNOSIS — I50.22 CHRONIC SYSTOLIC HEART FAILURE (H): ICD-10-CM

## 2024-08-27 DIAGNOSIS — I50.22 CHRONIC SYSTOLIC CONGESTIVE HEART FAILURE (H): ICD-10-CM

## 2024-08-27 PROCEDURE — 99213 OFFICE O/P EST LOW 20 MIN: CPT | Performed by: INTERNAL MEDICINE

## 2024-08-27 RX ORDER — METOPROLOL SUCCINATE 200 MG/1
200 TABLET, EXTENDED RELEASE ORAL DAILY
Qty: 90 TABLET | Refills: 3 | Status: SHIPPED | OUTPATIENT
Start: 2024-08-27

## 2024-08-27 RX ORDER — NITROGLYCERIN 0.4 MG/1
0.4 TABLET SUBLINGUAL EVERY 5 MIN PRN
Qty: 30 TABLET | Refills: 1 | Status: SHIPPED | OUTPATIENT
Start: 2024-08-27

## 2024-08-27 RX ORDER — ACETAMINOPHEN 500 MG
500 TABLET ORAL PRN
COMMUNITY

## 2024-08-27 RX ORDER — SACUBITRIL AND VALSARTAN 97; 103 MG/1; MG/1
1 TABLET, FILM COATED ORAL 2 TIMES DAILY
Qty: 180 TABLET | Refills: 3 | Status: SHIPPED | OUTPATIENT
Start: 2024-08-27

## 2024-08-27 RX ORDER — DAPAGLIFLOZIN 10 MG/1
10 TABLET, FILM COATED ORAL DAILY
Qty: 90 TABLET | Refills: 3 | Status: SHIPPED | OUTPATIENT
Start: 2024-08-27

## 2024-08-27 RX ORDER — FUROSEMIDE 40 MG
40 TABLET ORAL DAILY
Qty: 90 TABLET | Refills: 3 | Status: SHIPPED | OUTPATIENT
Start: 2024-08-27

## 2024-08-27 RX ORDER — SPIRONOLACTONE 25 MG/1
25 TABLET ORAL DAILY
Qty: 90 TABLET | Refills: 3 | Status: SHIPPED | OUTPATIENT
Start: 2024-08-27

## 2024-08-27 RX ORDER — ATORVASTATIN CALCIUM 20 MG/1
20 TABLET, FILM COATED ORAL DAILY
Qty: 90 TABLET | Refills: 3 | Status: SHIPPED | OUTPATIENT
Start: 2024-08-27

## 2024-08-27 NOTE — PROGRESS NOTES
CARDIOLOGY CLINIC CONSULTATION    PRIMARY CARE PHYSICIAN:  Rory Gambino    HISTORY OF PRESENT ILLNESS:  Tae is a very delightful 73-year-old gentleman, who transferred care from [facility] where he used to follow with Dr. Brendan Alva, and after his long-term, he established care with us at [facility] in [place] in 2021 and saw me in heart failure consultation then.  He has the following set of complex medical issues:     1.  History of a large anterior MI in 1997 with a failed attempted PCI and subsequent emergent bypass surgery, unclear details, but in 1997.  2.  Associated ischemic cardiomyopathy with an EF of 30%.  3.  Secondary ICD implanted during that admission, subsequent gen change in 2015.  4.  Chronic anticoagulation for unclear indications at this time.  The patient says he was on it after his discharge.  No reported history of DVT, PE, AFib or LV thrombus.  This has been stopped due to no clear indication for that.  5.  Hypertension. Hyperlipidemia. Prior history of smoking.  6.  History of ICD shock in 2018 with an echocardiogram at that time showing EF of 32% and a PET stress showing no ischemia, but a relatively large anterior infarct.   7.  Asymptomatic mitral regurgitation which was about 3+ in 2023 down to 2+ in 2024.  Conservative medical management.     The patient is here for routine follow-up.  He sees Danika More in the cardiology core clinic.  The patient has NYHA class I heart failure.  He has no angina.  In regards to guideline directed medical therapy for LV dysfunction, he is on full dose of 4 drug regimen.  Denies any recent heart failure admissions.  Overall feels quite well.       Last echocardiogram showed stable ejection fraction at 30 to 35% with 2+ mitral regurgitation.  Mild pulmonary hypertension having said that in 2022 right heart catheterization showed mild pulmonary hypertension as well with normal biventricular filling  pressures.    PAST MEDICAL HISTORY:  Past Medical History:   Diagnosis Date    BPH (benign prostatic hyperplasia)     Dr. Whitfield, PSA 0.27 12/2012    CAD (coronary artery disease) 1997    MI     CHF (congestive heart failure) (H)     Colon polyp     History of smoking     Quit 1997    HTN (hypertension), benign     Hyperlipidemia LDL goal <100     Ischemic cardiomyopathy     Dr. Alva    Lichen planus     Normal colonoscopy 1-3-08    Repeat 5-10 years    Pacemaker 1997, 2005    ventricular tachycardia    Pleural effusion     Pneumonia 1998    Pneumothorax     plus rib fx secondary to fall (L)    Seborrheic dermatitis     Toe fracture, right        MEDICATIONS:  Current Outpatient Medications   Medication Sig Dispense Refill    acetaminophen (TYLENOL) 500 MG tablet Take 500 mg by mouth as needed for mild pain. Arthritis/muscle pain of the knee      aspirin (ASA) 81 MG chewable tablet Take 1 tablet (81 mg) by mouth daily      atorvastatin (LIPITOR) 20 MG tablet Take 1 tablet (20 mg) by mouth daily 90 tablet 0    Cholecalciferol (VITAMIN D) 1000 UNITS capsule Take 1 capsule by mouth daily.      dapagliflozin (FARXIGA) 10 MG TABS tablet Take 1 tablet (10 mg) by mouth daily 90 tablet 3    fluocinolone (SYNALAR) 0.01 % solution PRN 60 mL 1    furosemide (LASIX) 40 MG tablet Take 1 tablet (40 mg) by mouth daily 90 tablet 3    metoprolol succinate ER (TOPROL XL) 200 MG 24 hr tablet Take 1 tablet (200 mg) by mouth daily 90 tablet 0    multivitamin w/minerals (THERA-VIT-M) tablet Take 1 tablet by mouth daily.      NIACIN 750 MG OR TBCR Take 500 mg by mouth 3 times daily. OTC      nitroGLYcerin (NITROSTAT) 0.4 MG sublingual tablet Place 1 tablet (0.4 mg) under the tongue every 5 minutes as needed for chest pain 30 tablet 1    sacubitril-valsartan (ENTRESTO)  MG per tablet Take 1 tablet by mouth 2 times daily 180 tablet 3    spironolactone (ALDACTONE) 25 MG tablet Take 1 tablet (25 mg) by mouth daily 90 tablet 0     triamcinolone (KENALOG) 0.1 % external cream Apply topically 2 times daily (Patient taking differently: Apply topically 2 times daily.) 45 g 1     No current facility-administered medications for this visit.       SOCIAL HISTORY:  I have reviewed this patient's social history and updated it with pertinent information if needed. Tae Arguelles  reports that he quit smoking about 26 years ago. His smoking use included cigarettes. He has never used smokeless tobacco. He reports current alcohol use. He reports that he does not use drugs.    PHYSICAL EXAM:  Pulse:  [69] 69  BP: (131)/(66) 131/66  SpO2:  [98 %] 98 %  178 lbs 4.8 oz    Constitutional: alert, no distress  Respiratory: Good bilateral air entry  Cardiovascular: Normal regular heart sounds very soft systolic murmur no edema  GI: nondistended  Neuropsychiatric: appropriate affact    ASSESSMENT: Pertinent issues addressed/ reviewed during this cardiology visit  Severe ischemic cardiomyopathy, NYHA class I stable  Coronary artery disease  Secondary ICD without recent therapies    RECOMMENDATIONS:  Patient is completely asymptomatic from a cardiac standpoint.  His blood pressure is fine.  He is tolerating 4 drug regimen.  No recent heart failure decompensations. I have not made any changes to his regimen.  Continue aspirin and statin therapy for coronary disease.  No angina.  EF is stable on echo and MR is actually 2+ now.  Recommend ongoing watchful waiting.  If it gets worse in the future we can entertain doing a DAVIDA if he gets symptomatic.  Follow-up routinely in the cardiology clinic in 1 year from now sooner if anything changes clinically.    It was a pleasure seeing this patient in clinic today. Please do not hesitate to contact me with any future questions.     KYA Taylor, [facility]  Cardiology - [facility]  August 27, 2024    Review of the result(s) of each unique test - Last CBC BMP lipids echocardiogram device interrogation ECG     The level of  medical decision making during this visit was of moderate complexity.    This note was completed in part using dictation via the Dragon voice recognition software. Some word and grammatical errors may occur and must be interpreted in the appropriate clinical context.  If there are any questions pertaining to this issue, please contact me for further clarification.

## 2024-08-27 NOTE — LETTER
8/27/2024    Rory Gambino MD  [address]  [place] [address]    RE: Tae ISAAC Kindra       Dear Colleague,     I had the pleasure of seeing Tae Arguelles in the [facility].  CARDIOLOGY CLINIC CONSULTATION    PRIMARY CARE PHYSICIAN:  Rory Gambino    HISTORY OF PRESENT ILLNESS:  Tae is a very delightful 73-year-old gentleman, who transferred care from [facility] where he used to follow with Dr. Brendan Alva, and after his residential, he established care with us at [facility] in [place] in 2021 and saw me in heart failure consultation then.  He has the following set of complex medical issues:     1.  History of a large anterior MI in 1997 with a failed attempted PCI and subsequent emergent bypass surgery, unclear details, but in 1997.  2.  Associated ischemic cardiomyopathy with an EF of 30%.  3.  Secondary ICD implanted during that admission, subsequent gen change in 2015.  4.  Chronic anticoagulation for unclear indications at this time.  The patient says he was on it after his discharge.  No reported history of DVT, PE, AFib or LV thrombus.  This has been stopped due to no clear indication for that.  5.  Hypertension. Hyperlipidemia. Prior history of smoking.  6.  History of ICD shock in 2018 with an echocardiogram at that time showing EF of 32% and a PET stress showing no ischemia, but a relatively large anterior infarct.   7.  Asymptomatic mitral regurgitation which was about 3+ in 2023 down to 2+ in 2024.  Conservative medical management.     The patient is here for routine follow-up.  He sees Danika More in the cardiology core clinic.  The patient has NYHA class I heart failure.  He has no angina.  In regards to guideline directed medical therapy for LV dysfunction, he is on full dose of 4 drug regimen.  Denies any recent heart failure admissions.  Overall feels quite well.       Last echocardiogram showed stable ejection fraction at 30 to  35% with 2+ mitral regurgitation.  Mild pulmonary hypertension having said that in 2022 right heart catheterization showed mild pulmonary hypertension as well with normal biventricular filling pressures.    PAST MEDICAL HISTORY:  Past Medical History:   Diagnosis Date     BPH (benign prostatic hyperplasia)     Dr. Whitfield, PSA 0.27 12/2012     CAD (coronary artery disease) 1997    MI      CHF (congestive heart failure) (H)      Colon polyp      History of smoking     Quit 1997     HTN (hypertension), benign      Hyperlipidemia LDL goal <100      Ischemic cardiomyopathy     Dr. Alva     Lichen planus      Normal colonoscopy 1-3-08    Repeat 5-10 years     Pacemaker 1997, 2005    ventricular tachycardia     Pleural effusion      Pneumonia 1998     Pneumothorax     plus rib fx secondary to fall (L)     Seborrheic dermatitis      Toe fracture, right        MEDICATIONS:  Current Outpatient Medications   Medication Sig Dispense Refill     acetaminophen (TYLENOL) 500 MG tablet Take 500 mg by mouth as needed for mild pain. Arthritis/muscle pain of the knee       aspirin (ASA) 81 MG chewable tablet Take 1 tablet (81 mg) by mouth daily       atorvastatin (LIPITOR) 20 MG tablet Take 1 tablet (20 mg) by mouth daily 90 tablet 0     Cholecalciferol (VITAMIN D) 1000 UNITS capsule Take 1 capsule by mouth daily.       dapagliflozin (FARXIGA) 10 MG TABS tablet Take 1 tablet (10 mg) by mouth daily 90 tablet 3     fluocinolone (SYNALAR) 0.01 % solution PRN 60 mL 1     furosemide (LASIX) 40 MG tablet Take 1 tablet (40 mg) by mouth daily 90 tablet 3     metoprolol succinate ER (TOPROL XL) 200 MG 24 hr tablet Take 1 tablet (200 mg) by mouth daily 90 tablet 0     multivitamin w/minerals (THERA-VIT-M) tablet Take 1 tablet by mouth daily.       NIACIN 750 MG OR TBCR Take 500 mg by mouth 3 times daily. OTC       nitroGLYcerin (NITROSTAT) 0.4 MG sublingual tablet Place 1 tablet (0.4 mg) under the tongue every 5 minutes as needed for chest  pain 30 tablet 1     sacubitril-valsartan (ENTRESTO)  MG per tablet Take 1 tablet by mouth 2 times daily 180 tablet 3     spironolactone (ALDACTONE) 25 MG tablet Take 1 tablet (25 mg) by mouth daily 90 tablet 0     triamcinolone (KENALOG) 0.1 % external cream Apply topically 2 times daily (Patient taking differently: Apply topically 2 times daily.) 45 g 1     No current facility-administered medications for this visit.       SOCIAL HISTORY:  I have reviewed this patient's social history and updated it with pertinent information if needed. Tae Arguelles  reports that he quit smoking about 26 years ago. His smoking use included cigarettes. He has never used smokeless tobacco. He reports current alcohol use. He reports that he does not use drugs.    PHYSICAL EXAM:  Pulse:  [69] 69  BP: (131)/(66) 131/66  SpO2:  [98 %] 98 %  178 lbs 4.8 oz    Constitutional: alert, no distress  Respiratory: Good bilateral air entry  Cardiovascular: Normal regular heart sounds very soft systolic murmur no edema  GI: nondistended  Neuropsychiatric: appropriate affact    ASSESSMENT: Pertinent issues addressed/ reviewed during this cardiology visit  Severe ischemic cardiomyopathy, NYHA class I stable  Coronary artery disease  Secondary ICD without recent therapies    RECOMMENDATIONS:  Patient is completely asymptomatic from a cardiac standpoint.  His blood pressure is fine.  He is tolerating 4 drug regimen.  No recent heart failure decompensations. I have not made any changes to his regimen.  Continue aspirin and statin therapy for coronary disease.  No angina.  EF is stable on echo and MR is actually 2+ now.  Recommend ongoing watchful waiting.  If it gets worse in the future we can entertain doing a DAVIDA if he gets symptomatic.  Follow-up routinely in the cardiology clinic in 1 year from now sooner if anything changes clinically.    It was a pleasure seeing this patient in clinic today. Please do not hesitate to contact me with  any future questions.     KYA Taylor, [facility]  Cardiology - [facility] Heart  August 27, 2024    Review of the result(s) of each unique test - Last CBC BMP lipids echocardiogram device interrogation ECG     The level of medical decision making during this visit was of moderate complexity.    This note was completed in part using dictation via the Dragon voice recognition software. Some word and grammatical errors may occur and must be interpreted in the appropriate clinical context.  If there are any questions pertaining to this issue, please contact me for further clarification.      Thank you for allowing me to participate in the care of your patient.      Sincerely,     Jono Santiago MD     [facility]  cc:   Jono Santiago MD  [address] [facility] [address] [place] [id number]

## 2024-10-10 ENCOUNTER — CARE COORDINATION (OUTPATIENT)
Dept: CARDIOLOGY | Facility: CLINIC | Age: 74
End: 2024-10-10
Payer: MEDICARE

## 2024-10-10 DIAGNOSIS — I50.22 CHRONIC SYSTOLIC CONGESTIVE HEART FAILURE (H): ICD-10-CM

## 2024-10-10 RX ORDER — DAPAGLIFLOZIN 10 MG/1
10 TABLET, FILM COATED ORAL DAILY
Qty: 90 TABLET | Refills: 3 | Status: SHIPPED | OUTPATIENT
Start: 2024-10-10

## 2024-10-10 NOTE — PROGRESS NOTES
Pt left message stating he was told by someone from [facility] pharmacy that  canceled his farxiga prescription. I called [facility] and was told a new prescription needs to be sent for the farxiga 10 mg daily as the previous one was closed out for some reason. I will send a refill at this time. I called pt with an update. Yeimi PRINCE October 10, 2024, 11:07 AM

## 2024-10-14 ENCOUNTER — ANCILLARY PROCEDURE (OUTPATIENT)
Dept: CARDIOLOGY | Facility: CLINIC | Age: 74
End: 2024-10-14
Attending: INTERNAL MEDICINE
Payer: MEDICARE

## 2024-10-14 DIAGNOSIS — Z95.810 ICD (IMPLANTABLE CARDIOVERTER-DEFIBRILLATOR) IN PLACE: ICD-10-CM

## 2024-10-14 DIAGNOSIS — I25.5 ISCHEMIC CARDIOMYOPATHY: ICD-10-CM

## 2024-10-14 PROCEDURE — 93296 REM INTERROG EVL PM/IDS: CPT | Performed by: INTERNAL MEDICINE

## 2024-10-14 PROCEDURE — 93295 DEV INTERROG REMOTE 1/2/MLT: CPT | Performed by: INTERNAL MEDICINE

## 2024-10-17 LAB
MDC_IDC_EPISODE_DTM: NORMAL
MDC_IDC_EPISODE_DURATION: 3 S
MDC_IDC_EPISODE_ID: 51
MDC_IDC_EPISODE_TYPE: NORMAL
MDC_IDC_LEAD_CONNECTION_STATUS: NORMAL
MDC_IDC_LEAD_IMPLANT_DT: NORMAL
MDC_IDC_LEAD_LOCATION: NORMAL
MDC_IDC_LEAD_MFG: NORMAL
MDC_IDC_LEAD_MODEL: NORMAL
MDC_IDC_LEAD_POLARITY_TYPE: NORMAL
MDC_IDC_LEAD_SERIAL: NORMAL
MDC_IDC_LEAD_SPECIAL_FUNCTION: NORMAL
MDC_IDC_MSMT_BATTERY_DTM: NORMAL
MDC_IDC_MSMT_BATTERY_REMAINING_LONGEVITY: 30 MO
MDC_IDC_MSMT_BATTERY_RRT_TRIGGER: 2.73
MDC_IDC_MSMT_BATTERY_STATUS: NORMAL
MDC_IDC_MSMT_BATTERY_VOLTAGE: 2.91 V
MDC_IDC_MSMT_LEADCHNL_RV_IMPEDANCE_VALUE: 323 OHM
MDC_IDC_MSMT_LEADCHNL_RV_IMPEDANCE_VALUE: 323 OHM
MDC_IDC_MSMT_LEADCHNL_RV_PACING_THRESHOLD_AMPLITUDE: 2.5 V
MDC_IDC_MSMT_LEADCHNL_RV_PACING_THRESHOLD_PULSEWIDTH: 0.4 MS
MDC_IDC_MSMT_LEADCHNL_RV_SENSING_INTR_AMPL: 16.38 MV
MDC_IDC_MSMT_LEADCHNL_RV_SENSING_INTR_AMPL: 16.38 MV
MDC_IDC_PG_IMPLANT_DTM: NORMAL
MDC_IDC_PG_MFG: NORMAL
MDC_IDC_PG_MODEL: NORMAL
MDC_IDC_PG_SERIAL: NORMAL
MDC_IDC_PG_TYPE: NORMAL
MDC_IDC_SESS_CLINIC_NAME: NORMAL
MDC_IDC_SESS_DTM: NORMAL
MDC_IDC_SESS_TYPE: NORMAL
MDC_IDC_SET_BRADY_HYSTRATE: NORMAL
MDC_IDC_SET_BRADY_LOWRATE: 40 {BEATS}/MIN
MDC_IDC_SET_BRADY_MODE: NORMAL
MDC_IDC_SET_LEADCHNL_RV_PACING_AMPLITUDE: 5 V
MDC_IDC_SET_LEADCHNL_RV_PACING_ANODE_ELECTRODE_1: NORMAL
MDC_IDC_SET_LEADCHNL_RV_PACING_ANODE_LOCATION_1: NORMAL
MDC_IDC_SET_LEADCHNL_RV_PACING_CAPTURE_MODE: NORMAL
MDC_IDC_SET_LEADCHNL_RV_PACING_CATHODE_ELECTRODE_1: NORMAL
MDC_IDC_SET_LEADCHNL_RV_PACING_CATHODE_LOCATION_1: NORMAL
MDC_IDC_SET_LEADCHNL_RV_PACING_POLARITY: NORMAL
MDC_IDC_SET_LEADCHNL_RV_PACING_PULSEWIDTH: 1 MS
MDC_IDC_SET_LEADCHNL_RV_SENSING_ANODE_ELECTRODE_1: NORMAL
MDC_IDC_SET_LEADCHNL_RV_SENSING_ANODE_LOCATION_1: NORMAL
MDC_IDC_SET_LEADCHNL_RV_SENSING_CATHODE_ELECTRODE_1: NORMAL
MDC_IDC_SET_LEADCHNL_RV_SENSING_CATHODE_LOCATION_1: NORMAL
MDC_IDC_SET_LEADCHNL_RV_SENSING_POLARITY: NORMAL
MDC_IDC_SET_LEADCHNL_RV_SENSING_SENSITIVITY: 0.3 MV
MDC_IDC_SET_ZONE_DETECTION_BEATS_DENOMINATOR: 16 {BEATS}
MDC_IDC_SET_ZONE_DETECTION_BEATS_DENOMINATOR: 40 {BEATS}
MDC_IDC_SET_ZONE_DETECTION_BEATS_DENOMINATOR: 40 {BEATS}
MDC_IDC_SET_ZONE_DETECTION_BEATS_NUMERATOR: 16 {BEATS}
MDC_IDC_SET_ZONE_DETECTION_BEATS_NUMERATOR: 30 {BEATS}
MDC_IDC_SET_ZONE_DETECTION_BEATS_NUMERATOR: 40 {BEATS}
MDC_IDC_SET_ZONE_DETECTION_INTERVAL: 320 MS
MDC_IDC_SET_ZONE_DETECTION_INTERVAL: 360 MS
MDC_IDC_SET_ZONE_DETECTION_INTERVAL: 400 MS
MDC_IDC_SET_ZONE_DETECTION_INTERVAL: NORMAL
MDC_IDC_SET_ZONE_STATUS: NORMAL
MDC_IDC_SET_ZONE_TYPE: NORMAL
MDC_IDC_SET_ZONE_VENDOR_TYPE: NORMAL
MDC_IDC_STAT_BRADY_DTM_END: NORMAL
MDC_IDC_STAT_BRADY_DTM_START: NORMAL
MDC_IDC_STAT_BRADY_RV_PERCENT_PACED: 0.02 %
MDC_IDC_STAT_EPISODE_RECENT_COUNT: 0
MDC_IDC_STAT_EPISODE_RECENT_COUNT: 1
MDC_IDC_STAT_EPISODE_RECENT_COUNT_DTM_END: NORMAL
MDC_IDC_STAT_EPISODE_RECENT_COUNT_DTM_START: NORMAL
MDC_IDC_STAT_EPISODE_TOTAL_COUNT: 0
MDC_IDC_STAT_EPISODE_TOTAL_COUNT: 1
MDC_IDC_STAT_EPISODE_TOTAL_COUNT: 17
MDC_IDC_STAT_EPISODE_TOTAL_COUNT_DTM_END: NORMAL
MDC_IDC_STAT_EPISODE_TOTAL_COUNT_DTM_START: NORMAL
MDC_IDC_STAT_EPISODE_TYPE: NORMAL
MDC_IDC_STAT_TACHYTHERAPY_ATP_DELIVERED_RECENT: 0
MDC_IDC_STAT_TACHYTHERAPY_ATP_DELIVERED_TOTAL: 0
MDC_IDC_STAT_TACHYTHERAPY_RECENT_DTM_END: NORMAL
MDC_IDC_STAT_TACHYTHERAPY_RECENT_DTM_START: NORMAL
MDC_IDC_STAT_TACHYTHERAPY_SHOCKS_ABORTED_RECENT: 0
MDC_IDC_STAT_TACHYTHERAPY_SHOCKS_ABORTED_TOTAL: 0
MDC_IDC_STAT_TACHYTHERAPY_SHOCKS_DELIVERED_RECENT: 0
MDC_IDC_STAT_TACHYTHERAPY_SHOCKS_DELIVERED_TOTAL: 1
MDC_IDC_STAT_TACHYTHERAPY_TOTAL_DTM_END: NORMAL
MDC_IDC_STAT_TACHYTHERAPY_TOTAL_DTM_START: NORMAL

## 2024-12-12 ENCOUNTER — ANCILLARY PROCEDURE (OUTPATIENT)
Dept: ULTRASOUND IMAGING | Facility: CLINIC | Age: 74
End: 2024-12-12
Attending: UROLOGY
Payer: MEDICARE

## 2024-12-12 DIAGNOSIS — N28.1 CYST OF KIDNEY, ACQUIRED: ICD-10-CM

## 2024-12-12 PROCEDURE — 76770 US EXAM ABDO BACK WALL COMP: CPT

## 2025-01-16 ENCOUNTER — ANCILLARY PROCEDURE (OUTPATIENT)
Dept: CARDIOLOGY | Facility: CLINIC | Age: 75
End: 2025-01-16
Attending: INTERNAL MEDICINE
Payer: MEDICARE

## 2025-01-16 DIAGNOSIS — Z95.810 ICD (IMPLANTABLE CARDIOVERTER-DEFIBRILLATOR) IN PLACE: ICD-10-CM

## 2025-01-16 DIAGNOSIS — I25.5 ISCHEMIC CARDIOMYOPATHY: Primary | ICD-10-CM

## 2025-01-16 DIAGNOSIS — I50.22 CHRONIC SYSTOLIC HEART FAILURE (H): ICD-10-CM

## 2025-01-16 LAB
MDC_IDC_EPISODE_DTM: NORMAL
MDC_IDC_EPISODE_DURATION: 3 S
MDC_IDC_EPISODE_ID: 52
MDC_IDC_EPISODE_TYPE: NORMAL
MDC_IDC_LEAD_CONNECTION_STATUS: NORMAL
MDC_IDC_LEAD_IMPLANT_DT: NORMAL
MDC_IDC_LEAD_LOCATION: NORMAL
MDC_IDC_LEAD_MFG: NORMAL
MDC_IDC_LEAD_MODEL: NORMAL
MDC_IDC_LEAD_POLARITY_TYPE: NORMAL
MDC_IDC_LEAD_SERIAL: NORMAL
MDC_IDC_LEAD_SPECIAL_FUNCTION: NORMAL
MDC_IDC_MSMT_BATTERY_DTM: NORMAL
MDC_IDC_MSMT_BATTERY_REMAINING_LONGEVITY: 24 MO
MDC_IDC_MSMT_BATTERY_RRT_TRIGGER: 2.73
MDC_IDC_MSMT_BATTERY_STATUS: NORMAL
MDC_IDC_MSMT_BATTERY_VOLTAGE: 2.91 V
MDC_IDC_MSMT_LEADCHNL_RV_IMPEDANCE_VALUE: 323 OHM
MDC_IDC_MSMT_LEADCHNL_RV_IMPEDANCE_VALUE: 323 OHM
MDC_IDC_MSMT_LEADCHNL_RV_PACING_THRESHOLD_AMPLITUDE: 2.38 V
MDC_IDC_MSMT_LEADCHNL_RV_PACING_THRESHOLD_PULSEWIDTH: 0.4 MS
MDC_IDC_MSMT_LEADCHNL_RV_SENSING_INTR_AMPL: 12.88 MV
MDC_IDC_MSMT_LEADCHNL_RV_SENSING_INTR_AMPL: 12.88 MV
MDC_IDC_PG_IMPLANT_DTM: NORMAL
MDC_IDC_PG_MFG: NORMAL
MDC_IDC_PG_MODEL: NORMAL
MDC_IDC_PG_SERIAL: NORMAL
MDC_IDC_PG_TYPE: NORMAL
MDC_IDC_SESS_CLINIC_NAME: NORMAL
MDC_IDC_SESS_DTM: NORMAL
MDC_IDC_SESS_TYPE: NORMAL
MDC_IDC_SET_BRADY_HYSTRATE: NORMAL
MDC_IDC_SET_BRADY_LOWRATE: 40 {BEATS}/MIN
MDC_IDC_SET_BRADY_MODE: NORMAL
MDC_IDC_SET_LEADCHNL_RV_PACING_AMPLITUDE: 5 V
MDC_IDC_SET_LEADCHNL_RV_PACING_ANODE_ELECTRODE_1: NORMAL
MDC_IDC_SET_LEADCHNL_RV_PACING_ANODE_LOCATION_1: NORMAL
MDC_IDC_SET_LEADCHNL_RV_PACING_CAPTURE_MODE: NORMAL
MDC_IDC_SET_LEADCHNL_RV_PACING_CATHODE_ELECTRODE_1: NORMAL
MDC_IDC_SET_LEADCHNL_RV_PACING_CATHODE_LOCATION_1: NORMAL
MDC_IDC_SET_LEADCHNL_RV_PACING_POLARITY: NORMAL
MDC_IDC_SET_LEADCHNL_RV_PACING_PULSEWIDTH: 1 MS
MDC_IDC_SET_LEADCHNL_RV_SENSING_ANODE_ELECTRODE_1: NORMAL
MDC_IDC_SET_LEADCHNL_RV_SENSING_ANODE_LOCATION_1: NORMAL
MDC_IDC_SET_LEADCHNL_RV_SENSING_CATHODE_ELECTRODE_1: NORMAL
MDC_IDC_SET_LEADCHNL_RV_SENSING_CATHODE_LOCATION_1: NORMAL
MDC_IDC_SET_LEADCHNL_RV_SENSING_POLARITY: NORMAL
MDC_IDC_SET_LEADCHNL_RV_SENSING_SENSITIVITY: 0.3 MV
MDC_IDC_SET_ZONE_DETECTION_BEATS_DENOMINATOR: 16 {BEATS}
MDC_IDC_SET_ZONE_DETECTION_BEATS_DENOMINATOR: 40 {BEATS}
MDC_IDC_SET_ZONE_DETECTION_BEATS_DENOMINATOR: 40 {BEATS}
MDC_IDC_SET_ZONE_DETECTION_BEATS_NUMERATOR: 16 {BEATS}
MDC_IDC_SET_ZONE_DETECTION_BEATS_NUMERATOR: 30 {BEATS}
MDC_IDC_SET_ZONE_DETECTION_BEATS_NUMERATOR: 40 {BEATS}
MDC_IDC_SET_ZONE_DETECTION_INTERVAL: 320 MS
MDC_IDC_SET_ZONE_DETECTION_INTERVAL: 360 MS
MDC_IDC_SET_ZONE_DETECTION_INTERVAL: 400 MS
MDC_IDC_SET_ZONE_DETECTION_INTERVAL: NORMAL
MDC_IDC_SET_ZONE_STATUS: NORMAL
MDC_IDC_SET_ZONE_TYPE: NORMAL
MDC_IDC_SET_ZONE_VENDOR_TYPE: NORMAL
MDC_IDC_STAT_BRADY_DTM_END: NORMAL
MDC_IDC_STAT_BRADY_DTM_START: NORMAL
MDC_IDC_STAT_BRADY_RV_PERCENT_PACED: 0.02 %
MDC_IDC_STAT_EPISODE_RECENT_COUNT: 0
MDC_IDC_STAT_EPISODE_RECENT_COUNT: 1
MDC_IDC_STAT_EPISODE_RECENT_COUNT_DTM_END: NORMAL
MDC_IDC_STAT_EPISODE_RECENT_COUNT_DTM_START: NORMAL
MDC_IDC_STAT_EPISODE_TOTAL_COUNT: 0
MDC_IDC_STAT_EPISODE_TOTAL_COUNT: 1
MDC_IDC_STAT_EPISODE_TOTAL_COUNT: 18
MDC_IDC_STAT_EPISODE_TOTAL_COUNT_DTM_END: NORMAL
MDC_IDC_STAT_EPISODE_TOTAL_COUNT_DTM_START: NORMAL
MDC_IDC_STAT_EPISODE_TYPE: NORMAL
MDC_IDC_STAT_TACHYTHERAPY_ATP_DELIVERED_RECENT: 0
MDC_IDC_STAT_TACHYTHERAPY_ATP_DELIVERED_TOTAL: 0
MDC_IDC_STAT_TACHYTHERAPY_RECENT_DTM_END: NORMAL
MDC_IDC_STAT_TACHYTHERAPY_RECENT_DTM_START: NORMAL
MDC_IDC_STAT_TACHYTHERAPY_SHOCKS_ABORTED_RECENT: 0
MDC_IDC_STAT_TACHYTHERAPY_SHOCKS_ABORTED_TOTAL: 0
MDC_IDC_STAT_TACHYTHERAPY_SHOCKS_DELIVERED_RECENT: 0
MDC_IDC_STAT_TACHYTHERAPY_SHOCKS_DELIVERED_TOTAL: 1
MDC_IDC_STAT_TACHYTHERAPY_TOTAL_DTM_END: NORMAL
MDC_IDC_STAT_TACHYTHERAPY_TOTAL_DTM_START: NORMAL

## 2025-01-16 PROCEDURE — 93295 DEV INTERROG REMOTE 1/2/MLT: CPT | Performed by: INTERNAL MEDICINE

## 2025-01-16 PROCEDURE — 93296 REM INTERROG EVL PM/IDS: CPT | Performed by: INTERNAL MEDICINE

## 2025-04-21 ENCOUNTER — ANCILLARY PROCEDURE (OUTPATIENT)
Dept: CARDIOLOGY | Facility: CLINIC | Age: 75
End: 2025-04-21
Attending: INTERNAL MEDICINE
Payer: MEDICARE

## 2025-04-21 DIAGNOSIS — Z95.810 ICD (IMPLANTABLE CARDIOVERTER-DEFIBRILLATOR) IN PLACE: ICD-10-CM

## 2025-04-21 PROCEDURE — 93295 DEV INTERROG REMOTE 1/2/MLT: CPT | Performed by: INTERNAL MEDICINE

## 2025-04-21 PROCEDURE — 93296 REM INTERROG EVL PM/IDS: CPT | Performed by: INTERNAL MEDICINE

## 2025-05-01 LAB
MDC_IDC_EPISODE_DTM: NORMAL
MDC_IDC_EPISODE_DURATION: 2 S
MDC_IDC_EPISODE_ID: 53
MDC_IDC_EPISODE_TYPE: NORMAL
MDC_IDC_LEAD_CONNECTION_STATUS: NORMAL
MDC_IDC_LEAD_IMPLANT_DT: NORMAL
MDC_IDC_LEAD_LOCATION: NORMAL
MDC_IDC_LEAD_MFG: NORMAL
MDC_IDC_LEAD_MODEL: NORMAL
MDC_IDC_LEAD_POLARITY_TYPE: NORMAL
MDC_IDC_LEAD_SERIAL: NORMAL
MDC_IDC_LEAD_SPECIAL_FUNCTION: NORMAL
MDC_IDC_MSMT_BATTERY_DTM: NORMAL
MDC_IDC_MSMT_BATTERY_REMAINING_LONGEVITY: 23 MO
MDC_IDC_MSMT_BATTERY_RRT_TRIGGER: 2.73
MDC_IDC_MSMT_BATTERY_STATUS: NORMAL
MDC_IDC_MSMT_BATTERY_VOLTAGE: 2.91 V
MDC_IDC_MSMT_LEADCHNL_RV_IMPEDANCE_VALUE: 323 OHM
MDC_IDC_MSMT_LEADCHNL_RV_IMPEDANCE_VALUE: 323 OHM
MDC_IDC_MSMT_LEADCHNL_RV_PACING_THRESHOLD_AMPLITUDE: 2.38 V
MDC_IDC_MSMT_LEADCHNL_RV_PACING_THRESHOLD_PULSEWIDTH: 0.4 MS
MDC_IDC_MSMT_LEADCHNL_RV_SENSING_INTR_AMPL: 13.88 MV
MDC_IDC_MSMT_LEADCHNL_RV_SENSING_INTR_AMPL: 13.88 MV
MDC_IDC_PG_IMPLANT_DTM: NORMAL
MDC_IDC_PG_MFG: NORMAL
MDC_IDC_PG_MODEL: NORMAL
MDC_IDC_PG_SERIAL: NORMAL
MDC_IDC_PG_TYPE: NORMAL
MDC_IDC_SESS_CLINIC_NAME: NORMAL
MDC_IDC_SESS_DTM: NORMAL
MDC_IDC_SESS_TYPE: NORMAL
MDC_IDC_SET_BRADY_HYSTRATE: NORMAL
MDC_IDC_SET_BRADY_LOWRATE: 40 {BEATS}/MIN
MDC_IDC_SET_BRADY_MODE: NORMAL
MDC_IDC_SET_LEADCHNL_RV_PACING_AMPLITUDE: 5 V
MDC_IDC_SET_LEADCHNL_RV_PACING_ANODE_ELECTRODE_1: NORMAL
MDC_IDC_SET_LEADCHNL_RV_PACING_ANODE_LOCATION_1: NORMAL
MDC_IDC_SET_LEADCHNL_RV_PACING_CAPTURE_MODE: NORMAL
MDC_IDC_SET_LEADCHNL_RV_PACING_CATHODE_ELECTRODE_1: NORMAL
MDC_IDC_SET_LEADCHNL_RV_PACING_CATHODE_LOCATION_1: NORMAL
MDC_IDC_SET_LEADCHNL_RV_PACING_POLARITY: NORMAL
MDC_IDC_SET_LEADCHNL_RV_PACING_PULSEWIDTH: 1 MS
MDC_IDC_SET_LEADCHNL_RV_SENSING_ANODE_ELECTRODE_1: NORMAL
MDC_IDC_SET_LEADCHNL_RV_SENSING_ANODE_LOCATION_1: NORMAL
MDC_IDC_SET_LEADCHNL_RV_SENSING_CATHODE_ELECTRODE_1: NORMAL
MDC_IDC_SET_LEADCHNL_RV_SENSING_CATHODE_LOCATION_1: NORMAL
MDC_IDC_SET_LEADCHNL_RV_SENSING_POLARITY: NORMAL
MDC_IDC_SET_LEADCHNL_RV_SENSING_SENSITIVITY: 0.3 MV
MDC_IDC_SET_ZONE_DETECTION_BEATS_DENOMINATOR: 16 {BEATS}
MDC_IDC_SET_ZONE_DETECTION_BEATS_DENOMINATOR: 40 {BEATS}
MDC_IDC_SET_ZONE_DETECTION_BEATS_DENOMINATOR: 40 {BEATS}
MDC_IDC_SET_ZONE_DETECTION_BEATS_NUMERATOR: 16 {BEATS}
MDC_IDC_SET_ZONE_DETECTION_BEATS_NUMERATOR: 30 {BEATS}
MDC_IDC_SET_ZONE_DETECTION_BEATS_NUMERATOR: 40 {BEATS}
MDC_IDC_SET_ZONE_DETECTION_INTERVAL: 320 MS
MDC_IDC_SET_ZONE_DETECTION_INTERVAL: 360 MS
MDC_IDC_SET_ZONE_DETECTION_INTERVAL: 400 MS
MDC_IDC_SET_ZONE_DETECTION_INTERVAL: NORMAL
MDC_IDC_SET_ZONE_STATUS: NORMAL
MDC_IDC_SET_ZONE_TYPE: NORMAL
MDC_IDC_SET_ZONE_VENDOR_TYPE: NORMAL
MDC_IDC_STAT_BRADY_DTM_END: NORMAL
MDC_IDC_STAT_BRADY_DTM_START: NORMAL
MDC_IDC_STAT_BRADY_RV_PERCENT_PACED: 0.01 %
MDC_IDC_STAT_EPISODE_RECENT_COUNT: 0
MDC_IDC_STAT_EPISODE_RECENT_COUNT: 1
MDC_IDC_STAT_EPISODE_RECENT_COUNT_DTM_END: NORMAL
MDC_IDC_STAT_EPISODE_RECENT_COUNT_DTM_START: NORMAL
MDC_IDC_STAT_EPISODE_TOTAL_COUNT: 0
MDC_IDC_STAT_EPISODE_TOTAL_COUNT: 1
MDC_IDC_STAT_EPISODE_TOTAL_COUNT: 19
MDC_IDC_STAT_EPISODE_TOTAL_COUNT_DTM_END: NORMAL
MDC_IDC_STAT_EPISODE_TOTAL_COUNT_DTM_START: NORMAL
MDC_IDC_STAT_EPISODE_TYPE: NORMAL
MDC_IDC_STAT_TACHYTHERAPY_ATP_DELIVERED_RECENT: 0
MDC_IDC_STAT_TACHYTHERAPY_ATP_DELIVERED_TOTAL: 0
MDC_IDC_STAT_TACHYTHERAPY_RECENT_DTM_END: NORMAL
MDC_IDC_STAT_TACHYTHERAPY_RECENT_DTM_START: NORMAL
MDC_IDC_STAT_TACHYTHERAPY_SHOCKS_ABORTED_RECENT: 0
MDC_IDC_STAT_TACHYTHERAPY_SHOCKS_ABORTED_TOTAL: 0
MDC_IDC_STAT_TACHYTHERAPY_SHOCKS_DELIVERED_RECENT: 0
MDC_IDC_STAT_TACHYTHERAPY_SHOCKS_DELIVERED_TOTAL: 1
MDC_IDC_STAT_TACHYTHERAPY_TOTAL_DTM_END: NORMAL
MDC_IDC_STAT_TACHYTHERAPY_TOTAL_DTM_START: NORMAL

## 2025-06-11 ENCOUNTER — ANCILLARY PROCEDURE (OUTPATIENT)
Dept: ULTRASOUND IMAGING | Facility: CLINIC | Age: 75
End: 2025-06-11
Attending: UROLOGY
Payer: MEDICARE

## 2025-06-11 DIAGNOSIS — N28.1 CYST OF KIDNEY, ACQUIRED: ICD-10-CM

## 2025-06-11 PROCEDURE — 76770 US EXAM ABDO BACK WALL COMP: CPT

## 2025-06-17 ENCOUNTER — MEDICAL CORRESPONDENCE (OUTPATIENT)
Dept: HEALTH INFORMATION MANAGEMENT | Facility: CLINIC | Age: 75
End: 2025-06-17

## 2025-07-11 PROBLEM — I47.29 OTHER VENTRICULAR TACHYCARDIA (H): Status: ACTIVE | Noted: 2025-07-11

## 2025-08-15 ENCOUNTER — ANCILLARY PROCEDURE (OUTPATIENT)
Dept: CARDIOLOGY | Facility: CLINIC | Age: 75
End: 2025-08-15
Attending: INTERNAL MEDICINE
Payer: MEDICARE

## 2025-08-15 DIAGNOSIS — I25.5 ISCHEMIC CARDIOMYOPATHY: ICD-10-CM

## 2025-08-15 DIAGNOSIS — I50.22 CHRONIC SYSTOLIC HEART FAILURE (H): ICD-10-CM

## 2025-08-15 DIAGNOSIS — Z95.810 ICD (IMPLANTABLE CARDIOVERTER-DEFIBRILLATOR) IN PLACE: ICD-10-CM

## 2025-08-15 PROCEDURE — 93279 PRGRMG DEV EVAL PM/LDLS PM: CPT | Performed by: INTERNAL MEDICINE

## 2025-08-19 LAB
MDC_IDC_EPISODE_DTM: NORMAL
MDC_IDC_EPISODE_DURATION: 1 S
MDC_IDC_EPISODE_ID: 54
MDC_IDC_EPISODE_TYPE: NORMAL
MDC_IDC_EPISODE_TYPE_INDUCED: NO
MDC_IDC_LEAD_CONNECTION_STATUS: NORMAL
MDC_IDC_LEAD_IMPLANT_DT: NORMAL
MDC_IDC_LEAD_LOCATION: NORMAL
MDC_IDC_LEAD_MFG: NORMAL
MDC_IDC_LEAD_MODEL: NORMAL
MDC_IDC_LEAD_POLARITY_TYPE: NORMAL
MDC_IDC_LEAD_SERIAL: NORMAL
MDC_IDC_LEAD_SPECIAL_FUNCTION: NORMAL
MDC_IDC_MSMT_BATTERY_DTM: NORMAL
MDC_IDC_MSMT_BATTERY_REMAINING_LONGEVITY: 20 MO
MDC_IDC_MSMT_BATTERY_RRT_TRIGGER: 2.73
MDC_IDC_MSMT_BATTERY_STATUS: NORMAL
MDC_IDC_MSMT_BATTERY_VOLTAGE: 2.91 V
MDC_IDC_MSMT_LEADCHNL_RV_IMPEDANCE_VALUE: 323 OHM
MDC_IDC_MSMT_LEADCHNL_RV_IMPEDANCE_VALUE: 342 OHM
MDC_IDC_MSMT_LEADCHNL_RV_PACING_THRESHOLD_AMPLITUDE: 2.38 V
MDC_IDC_MSMT_LEADCHNL_RV_PACING_THRESHOLD_PULSEWIDTH: 0.4 MS
MDC_IDC_MSMT_LEADCHNL_RV_SENSING_INTR_AMPL: 16 MV
MDC_IDC_MSMT_LEADCHNL_RV_SENSING_INTR_AMPL: 21.62 MV
MDC_IDC_PG_IMPLANT_DTM: NORMAL
MDC_IDC_PG_MFG: NORMAL
MDC_IDC_PG_MODEL: NORMAL
MDC_IDC_PG_SERIAL: NORMAL
MDC_IDC_PG_TYPE: NORMAL
MDC_IDC_SESS_CLINIC_NAME: NORMAL
MDC_IDC_SESS_DTM: NORMAL
MDC_IDC_SESS_TYPE: NORMAL
MDC_IDC_SET_BRADY_HYSTRATE: NORMAL
MDC_IDC_SET_BRADY_LOWRATE: 40 {BEATS}/MIN
MDC_IDC_SET_BRADY_MODE: NORMAL
MDC_IDC_SET_LEADCHNL_RV_PACING_AMPLITUDE: 5 V
MDC_IDC_SET_LEADCHNL_RV_PACING_ANODE_ELECTRODE_1: NORMAL
MDC_IDC_SET_LEADCHNL_RV_PACING_ANODE_LOCATION_1: NORMAL
MDC_IDC_SET_LEADCHNL_RV_PACING_CAPTURE_MODE: NORMAL
MDC_IDC_SET_LEADCHNL_RV_PACING_CATHODE_ELECTRODE_1: NORMAL
MDC_IDC_SET_LEADCHNL_RV_PACING_CATHODE_LOCATION_1: NORMAL
MDC_IDC_SET_LEADCHNL_RV_PACING_POLARITY: NORMAL
MDC_IDC_SET_LEADCHNL_RV_PACING_PULSEWIDTH: 1 MS
MDC_IDC_SET_LEADCHNL_RV_SENSING_ANODE_ELECTRODE_1: NORMAL
MDC_IDC_SET_LEADCHNL_RV_SENSING_ANODE_LOCATION_1: NORMAL
MDC_IDC_SET_LEADCHNL_RV_SENSING_CATHODE_ELECTRODE_1: NORMAL
MDC_IDC_SET_LEADCHNL_RV_SENSING_CATHODE_LOCATION_1: NORMAL
MDC_IDC_SET_LEADCHNL_RV_SENSING_POLARITY: NORMAL
MDC_IDC_SET_LEADCHNL_RV_SENSING_SENSITIVITY: 0.3 MV
MDC_IDC_SET_ZONE_DETECTION_BEATS_DENOMINATOR: 16 {BEATS}
MDC_IDC_SET_ZONE_DETECTION_BEATS_DENOMINATOR: 40 {BEATS}
MDC_IDC_SET_ZONE_DETECTION_BEATS_DENOMINATOR: 40 {BEATS}
MDC_IDC_SET_ZONE_DETECTION_BEATS_NUMERATOR: 16 {BEATS}
MDC_IDC_SET_ZONE_DETECTION_BEATS_NUMERATOR: 30 {BEATS}
MDC_IDC_SET_ZONE_DETECTION_BEATS_NUMERATOR: 40 {BEATS}
MDC_IDC_SET_ZONE_DETECTION_INTERVAL: 320 MS
MDC_IDC_SET_ZONE_DETECTION_INTERVAL: 360 MS
MDC_IDC_SET_ZONE_DETECTION_INTERVAL: 400 MS
MDC_IDC_SET_ZONE_DETECTION_INTERVAL: NORMAL
MDC_IDC_SET_ZONE_STATUS: NORMAL
MDC_IDC_SET_ZONE_TYPE: NORMAL
MDC_IDC_SET_ZONE_VENDOR_TYPE: NORMAL
MDC_IDC_STAT_BRADY_DTM_END: NORMAL
MDC_IDC_STAT_BRADY_DTM_START: NORMAL
MDC_IDC_STAT_BRADY_RV_PERCENT_PACED: 0.02 %
MDC_IDC_STAT_EPISODE_RECENT_COUNT: 0
MDC_IDC_STAT_EPISODE_RECENT_COUNT: 4
MDC_IDC_STAT_EPISODE_RECENT_COUNT_DTM_END: NORMAL
MDC_IDC_STAT_EPISODE_RECENT_COUNT_DTM_START: NORMAL
MDC_IDC_STAT_EPISODE_TOTAL_COUNT: 0
MDC_IDC_STAT_EPISODE_TOTAL_COUNT: 1
MDC_IDC_STAT_EPISODE_TOTAL_COUNT: 20
MDC_IDC_STAT_EPISODE_TOTAL_COUNT_DTM_END: NORMAL
MDC_IDC_STAT_EPISODE_TOTAL_COUNT_DTM_START: NORMAL
MDC_IDC_STAT_EPISODE_TYPE: NORMAL
MDC_IDC_STAT_TACHYTHERAPY_ATP_DELIVERED_RECENT: 0
MDC_IDC_STAT_TACHYTHERAPY_ATP_DELIVERED_TOTAL: 0
MDC_IDC_STAT_TACHYTHERAPY_RECENT_DTM_END: NORMAL
MDC_IDC_STAT_TACHYTHERAPY_RECENT_DTM_START: NORMAL
MDC_IDC_STAT_TACHYTHERAPY_SHOCKS_ABORTED_RECENT: 0
MDC_IDC_STAT_TACHYTHERAPY_SHOCKS_ABORTED_TOTAL: 0
MDC_IDC_STAT_TACHYTHERAPY_SHOCKS_DELIVERED_RECENT: 0
MDC_IDC_STAT_TACHYTHERAPY_SHOCKS_DELIVERED_TOTAL: 1
MDC_IDC_STAT_TACHYTHERAPY_TOTAL_DTM_END: NORMAL
MDC_IDC_STAT_TACHYTHERAPY_TOTAL_DTM_START: NORMAL

## 2025-08-28 ENCOUNTER — OFFICE VISIT (OUTPATIENT)
Dept: URGENT CARE | Facility: URGENT CARE | Age: 75
End: 2025-08-28
Payer: MEDICARE

## 2025-08-28 VITALS
SYSTOLIC BLOOD PRESSURE: 138 MMHG | OXYGEN SATURATION: 99 % | TEMPERATURE: 98 F | HEART RATE: 78 BPM | DIASTOLIC BLOOD PRESSURE: 63 MMHG | RESPIRATION RATE: 20 BRPM | HEIGHT: 68 IN | WEIGHT: 185 LBS | BODY MASS INDEX: 28.04 KG/M2

## 2025-08-28 DIAGNOSIS — R21 RASH AND NONSPECIFIC SKIN ERUPTION: Primary | ICD-10-CM

## 2025-09-04 ENCOUNTER — TELEPHONE (OUTPATIENT)
Dept: INTERVENTIONAL RADIOLOGY/VASCULAR | Facility: CLINIC | Age: 75
End: 2025-09-04
Payer: MEDICARE

## (undated) DEVICE — KIT HAND CONTROL ANGIOTOUCH ACIST 65CM AT-P65

## (undated) DEVICE — DEFIB PRO-PADZ LVP LQD GEL ADULT 8900-2105-01

## (undated) DEVICE — TOTE ANGIO CORP PC15AT SAN32CC83O

## (undated) DEVICE — MANIFOLD KIT ANGIO AUTOMATED 014613

## (undated) DEVICE — NDL PERC ENTRY 21GA 4CM G00280

## (undated) DEVICE — Device

## (undated) DEVICE — INTRO SHEATH 7FRX10CM PINNACLE RSS702

## (undated) DEVICE — INTRODUCER CATH VASC 5FRX10CM  MPIS-501-NT-U-SST

## (undated) RX ORDER — POTASSIUM CHLORIDE 1500 MG/1
TABLET, EXTENDED RELEASE ORAL
Status: DISPENSED
Start: 2022-11-30